# Patient Record
Sex: MALE | Race: BLACK OR AFRICAN AMERICAN | Employment: UNEMPLOYED | URBAN - METROPOLITAN AREA
[De-identification: names, ages, dates, MRNs, and addresses within clinical notes are randomized per-mention and may not be internally consistent; named-entity substitution may affect disease eponyms.]

---

## 2017-01-01 ENCOUNTER — TELEPHONE (OUTPATIENT)
Dept: FAMILY MEDICINE CLINIC | Age: 52
End: 2017-01-01

## 2017-01-01 ENCOUNTER — HOSPITAL ENCOUNTER (OUTPATIENT)
Dept: ULTRASOUND IMAGING | Age: 52
Discharge: HOME | End: 2017-11-15
Attending: INTERNAL MEDICINE
Payer: COMMERCIAL

## 2017-01-01 ENCOUNTER — HOSPITAL ENCOUNTER (OUTPATIENT)
Dept: CT IMAGING | Age: 52
Discharge: HOME | End: 2017-11-07
Attending: INTERNAL MEDICINE
Payer: COMMERCIAL

## 2017-01-01 ENCOUNTER — HOSPITAL ENCOUNTER (OUTPATIENT)
Dept: ULTRASOUND IMAGING | Age: 52
Discharge: HOME | End: 2017-10-04
Attending: INTERNAL MEDICINE
Payer: COMMERCIAL

## 2017-01-01 ENCOUNTER — HOSPITAL ENCOUNTER (OUTPATIENT)
Dept: CT IMAGING | Age: 52
Discharge: HOME | End: 2017-07-08
Attending: INTERNAL MEDICINE
Payer: COMMERCIAL

## 2017-01-01 ENCOUNTER — HOSPITAL ENCOUNTER (INPATIENT)
Age: 52
LOS: 4 days | DRG: 368 | End: 2017-11-25
Attending: EMERGENCY MEDICINE | Admitting: INTERNAL MEDICINE
Payer: COMMERCIAL

## 2017-01-01 ENCOUNTER — HOSPITAL ENCOUNTER (OUTPATIENT)
Dept: ULTRASOUND IMAGING | Age: 52
Discharge: HOME | End: 2017-10-11
Attending: INTERNAL MEDICINE
Payer: COMMERCIAL

## 2017-01-01 ENCOUNTER — HOSPITAL ENCOUNTER (OUTPATIENT)
Dept: ULTRASOUND IMAGING | Age: 52
Discharge: HOME | End: 2017-09-27
Attending: INTERNAL MEDICINE
Payer: COMMERCIAL

## 2017-01-01 ENCOUNTER — HOSPITAL ENCOUNTER (OUTPATIENT)
Dept: CT IMAGING | Age: 52
Discharge: HOME | End: 2017-08-10
Attending: RADIOLOGY | Admitting: RADIOLOGY
Payer: COMMERCIAL

## 2017-01-01 ENCOUNTER — HOSPITAL ENCOUNTER (OUTPATIENT)
Dept: LAB | Age: 52
Discharge: HOME | End: 2017-04-18
Payer: SELF-PAY

## 2017-01-01 ENCOUNTER — HOSPITAL ENCOUNTER (EMERGENCY)
Age: 52
Discharge: HOME | End: 2017-09-14
Attending: EMERGENCY MEDICINE | Admitting: EMERGENCY MEDICINE
Payer: COMMERCIAL

## 2017-01-01 ENCOUNTER — HOSPITAL ENCOUNTER (OUTPATIENT)
Dept: LAB | Age: 52
Discharge: HOME | End: 2017-10-18

## 2017-01-01 ENCOUNTER — HOSPITAL ENCOUNTER (OUTPATIENT)
Dept: ULTRASOUND IMAGING | Age: 52
Discharge: HOME | End: 2017-09-13
Attending: INTERNAL MEDICINE
Payer: COMMERCIAL

## 2017-01-01 ENCOUNTER — HOSPITAL ENCOUNTER (EMERGENCY)
Age: 52
Discharge: HOME | End: 2017-07-19
Attending: EMERGENCY MEDICINE
Payer: COMMERCIAL

## 2017-01-01 ENCOUNTER — HOSPITAL ENCOUNTER (OUTPATIENT)
Dept: LAB | Age: 52
Discharge: HOME | End: 2017-11-15
Attending: INTERNAL MEDICINE
Payer: COMMERCIAL

## 2017-01-01 ENCOUNTER — HOSPITAL ENCOUNTER (OUTPATIENT)
Dept: INFUSION THERAPY | Age: 52
Discharge: HOME | End: 2017-07-06
Payer: COMMERCIAL

## 2017-01-01 ENCOUNTER — APPOINTMENT (OUTPATIENT)
Dept: ULTRASOUND IMAGING | Age: 52
DRG: 368 | End: 2017-01-01
Attending: INTERNAL MEDICINE
Payer: COMMERCIAL

## 2017-01-01 ENCOUNTER — OFFICE VISIT (OUTPATIENT)
Dept: FAMILY MEDICINE CLINIC | Age: 52
End: 2017-01-01

## 2017-01-01 ENCOUNTER — HOSPITAL ENCOUNTER (OUTPATIENT)
Dept: ULTRASOUND IMAGING | Age: 52
Discharge: HOME | End: 2017-10-18
Attending: INTERNAL MEDICINE
Payer: COMMERCIAL

## 2017-01-01 ENCOUNTER — HOSPITAL ENCOUNTER (OUTPATIENT)
Dept: ULTRASOUND IMAGING | Age: 52
Discharge: HOME | End: 2017-11-01
Attending: INTERNAL MEDICINE
Payer: COMMERCIAL

## 2017-01-01 ENCOUNTER — HOSPITAL ENCOUNTER (OUTPATIENT)
Dept: ULTRASOUND IMAGING | Age: 52
Discharge: HOME | End: 2017-08-18
Attending: INTERNAL MEDICINE
Payer: COMMERCIAL

## 2017-01-01 ENCOUNTER — HOSPITAL ENCOUNTER (OUTPATIENT)
Dept: INFUSION THERAPY | Age: 52
Discharge: HOME | End: 2017-07-07
Payer: COMMERCIAL

## 2017-01-01 ENCOUNTER — HOSPITAL ENCOUNTER (OUTPATIENT)
Dept: ULTRASOUND IMAGING | Age: 52
Discharge: HOME | End: 2017-10-25
Attending: INTERNAL MEDICINE
Payer: COMMERCIAL

## 2017-01-01 ENCOUNTER — APPOINTMENT (OUTPATIENT)
Dept: GENERAL RADIOLOGY | Age: 52
End: 2017-01-01
Attending: EMERGENCY MEDICINE
Payer: COMMERCIAL

## 2017-01-01 ENCOUNTER — HOSPITAL ENCOUNTER (OUTPATIENT)
Dept: ULTRASOUND IMAGING | Age: 52
Discharge: HOME | End: 2017-09-20
Attending: INTERNAL MEDICINE
Payer: COMMERCIAL

## 2017-01-01 ENCOUNTER — APPOINTMENT (OUTPATIENT)
Dept: GENERAL RADIOLOGY | Age: 52
DRG: 368 | End: 2017-01-01
Attending: EMERGENCY MEDICINE
Payer: COMMERCIAL

## 2017-01-01 ENCOUNTER — HOSPITAL ENCOUNTER (OUTPATIENT)
Dept: LAB | Age: 52
Discharge: HOME | End: 2017-09-06
Payer: COMMERCIAL

## 2017-01-01 ENCOUNTER — APPOINTMENT (OUTPATIENT)
Dept: ULTRASOUND IMAGING | Age: 52
End: 2017-01-01
Attending: INTERNAL MEDICINE
Payer: COMMERCIAL

## 2017-01-01 ENCOUNTER — PATIENT OUTREACH (OUTPATIENT)
Dept: FAMILY MEDICINE CLINIC | Age: 52
End: 2017-01-01

## 2017-01-01 ENCOUNTER — HOSPITAL ENCOUNTER (OUTPATIENT)
Dept: ULTRASOUND IMAGING | Age: 52
Discharge: HOME | End: 2017-09-06
Attending: INTERNAL MEDICINE
Payer: COMMERCIAL

## 2017-01-01 VITALS
SYSTOLIC BLOOD PRESSURE: 124 MMHG | OXYGEN SATURATION: 100 % | HEART RATE: 103 BPM | HEIGHT: 67 IN | TEMPERATURE: 100.5 F | RESPIRATION RATE: 24 BRPM | DIASTOLIC BLOOD PRESSURE: 89 MMHG | WEIGHT: 150 LBS | BODY MASS INDEX: 23.54 KG/M2

## 2017-01-01 VITALS
BODY MASS INDEX: 24.64 KG/M2 | TEMPERATURE: 98 F | WEIGHT: 157 LBS | SYSTOLIC BLOOD PRESSURE: 92 MMHG | RESPIRATION RATE: 16 BRPM | HEIGHT: 67 IN | OXYGEN SATURATION: 99 % | DIASTOLIC BLOOD PRESSURE: 60 MMHG | HEART RATE: 103 BPM

## 2017-01-01 VITALS
RESPIRATION RATE: 17 BRPM | TEMPERATURE: 97.4 F | SYSTOLIC BLOOD PRESSURE: 91 MMHG | OXYGEN SATURATION: 98 % | HEART RATE: 89 BPM | DIASTOLIC BLOOD PRESSURE: 59 MMHG

## 2017-01-01 VITALS
BODY MASS INDEX: 23.7 KG/M2 | OXYGEN SATURATION: 98 % | DIASTOLIC BLOOD PRESSURE: 72 MMHG | HEIGHT: 67 IN | RESPIRATION RATE: 16 BRPM | TEMPERATURE: 98 F | WEIGHT: 151 LBS | SYSTOLIC BLOOD PRESSURE: 102 MMHG | HEART RATE: 62 BPM

## 2017-01-01 VITALS
TEMPERATURE: 98.7 F | SYSTOLIC BLOOD PRESSURE: 136 MMHG | DIASTOLIC BLOOD PRESSURE: 95 MMHG | HEART RATE: 91 BPM | RESPIRATION RATE: 20 BRPM | HEIGHT: 67 IN | BODY MASS INDEX: 25.31 KG/M2 | WEIGHT: 161.25 LBS | OXYGEN SATURATION: 100 %

## 2017-01-01 VITALS
HEIGHT: 67 IN | BODY MASS INDEX: 24.55 KG/M2 | TEMPERATURE: 98.3 F | HEART RATE: 43 BPM | DIASTOLIC BLOOD PRESSURE: 26 MMHG | OXYGEN SATURATION: 98 % | RESPIRATION RATE: 7 BRPM | WEIGHT: 156.4 LBS | SYSTOLIC BLOOD PRESSURE: 51 MMHG

## 2017-01-01 VITALS
RESPIRATION RATE: 15 BRPM | TEMPERATURE: 98.3 F | SYSTOLIC BLOOD PRESSURE: 108 MMHG | HEART RATE: 81 BPM | DIASTOLIC BLOOD PRESSURE: 75 MMHG

## 2017-01-01 VITALS
OXYGEN SATURATION: 99 % | TEMPERATURE: 98 F | DIASTOLIC BLOOD PRESSURE: 77 MMHG | SYSTOLIC BLOOD PRESSURE: 113 MMHG | RESPIRATION RATE: 18 BRPM | HEART RATE: 60 BPM

## 2017-01-01 VITALS
BODY MASS INDEX: 25.52 KG/M2 | TEMPERATURE: 98 F | RESPIRATION RATE: 16 BRPM | SYSTOLIC BLOOD PRESSURE: 90 MMHG | DIASTOLIC BLOOD PRESSURE: 70 MMHG | HEIGHT: 67 IN | WEIGHT: 162.6 LBS

## 2017-01-01 VITALS
OXYGEN SATURATION: 99 % | HEART RATE: 76 BPM | BODY MASS INDEX: 24.92 KG/M2 | WEIGHT: 158.8 LBS | RESPIRATION RATE: 16 BRPM | SYSTOLIC BLOOD PRESSURE: 130 MMHG | DIASTOLIC BLOOD PRESSURE: 88 MMHG | HEIGHT: 67 IN

## 2017-01-01 DIAGNOSIS — B37.0 ORAL THRUSH: ICD-10-CM

## 2017-01-01 DIAGNOSIS — R79.89 ABNORMAL CBC: ICD-10-CM

## 2017-01-01 DIAGNOSIS — R18.8 OTHER ASCITES: ICD-10-CM

## 2017-01-01 DIAGNOSIS — C18.9 MALIGNANT NEOPLASM OF COLON, UNSPECIFIED PART OF COLON (HCC): ICD-10-CM

## 2017-01-01 DIAGNOSIS — R52 PAIN: ICD-10-CM

## 2017-01-01 DIAGNOSIS — Z23 ENCOUNTER FOR IMMUNIZATION: ICD-10-CM

## 2017-01-01 DIAGNOSIS — C18.4 MALIGNANT NEOPLASM OF TRANSVERSE COLON (HCC): ICD-10-CM

## 2017-01-01 DIAGNOSIS — C78.7 LIVER METASTASES (HCC): ICD-10-CM

## 2017-01-01 DIAGNOSIS — C78.7 SECONDARY MALIGNANT NEOPLASM OF LIVER (HCC): ICD-10-CM

## 2017-01-01 DIAGNOSIS — I95.9 HYPOTENSION, UNSPECIFIED HYPOTENSION TYPE: ICD-10-CM

## 2017-01-01 DIAGNOSIS — I26.99 PULMONARY EMBOLISM, OTHER: ICD-10-CM

## 2017-01-01 DIAGNOSIS — E11.65 POORLY CONTROLLED DIABETES MELLITUS (HCC): ICD-10-CM

## 2017-01-01 DIAGNOSIS — I26.99 OTHER PULMONARY EMBOLISM WITHOUT ACUTE COR PULMONALE, UNSPECIFIED CHRONICITY (HCC): ICD-10-CM

## 2017-01-01 DIAGNOSIS — I85.11 SECONDARY ESOPHAGEAL VARICES WITH BLEEDING (HCC): ICD-10-CM

## 2017-01-01 DIAGNOSIS — C18.9 COLON CANCER (HCC): ICD-10-CM

## 2017-01-01 DIAGNOSIS — R79.89 ABNORMAL CBC: Primary | ICD-10-CM

## 2017-01-01 DIAGNOSIS — E11.9 WELL CONTROLLED DIABETES MELLITUS (HCC): ICD-10-CM

## 2017-01-01 DIAGNOSIS — G47.9 SLEEP TROUBLE: ICD-10-CM

## 2017-01-01 DIAGNOSIS — C18.9 METASTATIC COLON CANCER TO LIVER (HCC): Primary | ICD-10-CM

## 2017-01-01 DIAGNOSIS — R35.0 URINARY FREQUENCY: ICD-10-CM

## 2017-01-01 DIAGNOSIS — R18.8 ASCITES: ICD-10-CM

## 2017-01-01 DIAGNOSIS — E87.5 ACUTE HYPERKALEMIA: ICD-10-CM

## 2017-01-01 DIAGNOSIS — R10.9 ABDOMINAL PAIN: ICD-10-CM

## 2017-01-01 DIAGNOSIS — C78.7 METASTATIC COLON CANCER TO LIVER (HCC): Primary | ICD-10-CM

## 2017-01-01 DIAGNOSIS — E11.65 POORLY CONTROLLED DIABETES MELLITUS (HCC): Primary | ICD-10-CM

## 2017-01-01 DIAGNOSIS — M79.89 LEG SWELLING: ICD-10-CM

## 2017-01-01 DIAGNOSIS — I10 ESSENTIAL HYPERTENSION: ICD-10-CM

## 2017-01-01 DIAGNOSIS — Z71.89 ADVANCED CARE PLANNING/COUNSELING DISCUSSION: ICD-10-CM

## 2017-01-01 DIAGNOSIS — E11.9 WELL CONTROLLED DIABETES MELLITUS (HCC): Primary | ICD-10-CM

## 2017-01-01 DIAGNOSIS — D64.9 ANEMIA, UNSPECIFIED TYPE: ICD-10-CM

## 2017-01-01 DIAGNOSIS — R10.13 ABDOMINAL PAIN, EPIGASTRIC: Primary | ICD-10-CM

## 2017-01-01 DIAGNOSIS — N17.9 ACUTE RENAL FAILURE, UNSPECIFIED ACUTE RENAL FAILURE TYPE (HCC): ICD-10-CM

## 2017-01-01 DIAGNOSIS — J20.9 ACUTE BRONCHITIS, UNSPECIFIED ORGANISM: Primary | ICD-10-CM

## 2017-01-01 DIAGNOSIS — Z79.01 ANTICOAGULANT LONG-TERM USE: ICD-10-CM

## 2017-01-01 DIAGNOSIS — K92.2 GASTROINTESTINAL HEMORRHAGE, UNSPECIFIED GASTROINTESTINAL HEMORRHAGE TYPE: ICD-10-CM

## 2017-01-01 DIAGNOSIS — R18.0 ASCITES, MALIGNANT: ICD-10-CM

## 2017-01-01 DIAGNOSIS — E87.1 HYPONATREMIA: ICD-10-CM

## 2017-01-01 DIAGNOSIS — Z79.4 TYPE 2 DIABETES MELLITUS WITH HYPERGLYCEMIA, WITH LONG-TERM CURRENT USE OF INSULIN (HCC): Primary | ICD-10-CM

## 2017-01-01 DIAGNOSIS — I10 ESSENTIAL HYPERTENSION: Primary | ICD-10-CM

## 2017-01-01 DIAGNOSIS — E11.65 TYPE 2 DIABETES MELLITUS WITH HYPERGLYCEMIA, WITH LONG-TERM CURRENT USE OF INSULIN (HCC): Primary | ICD-10-CM

## 2017-01-01 LAB
ABO + RH BLD: NORMAL
ABO + RH BLD: NORMAL
ALBUMIN SERPL BCP-MCNC: 2.3 G/DL (ref 3.4–5)
ALBUMIN SERPL BCP-MCNC: 2.6 G/DL (ref 3.4–5)
ALBUMIN SERPL-MCNC: 1.2 G/DL (ref 3.4–5)
ALBUMIN SERPL-MCNC: 1.2 G/DL (ref 3.4–5)
ALBUMIN SERPL-MCNC: 1.4 G/DL (ref 3.4–5)
ALBUMIN SERPL-MCNC: 1.7 G/DL (ref 3.4–5)
ALBUMIN/GLOB SERPL: 0.3 {RATIO} (ref 0.8–1.7)
ALBUMIN/GLOB SERPL: 0.4 {RATIO} (ref 0.8–1.7)
ALBUMIN/GLOB SERPL: 0.5 {RATIO} (ref 0.8–1.7)
ALBUMIN/GLOB SERPL: 0.6 {RATIO} (ref 0.8–1.7)
ALP SERPL-CCNC: 1182 U/L (ref 45–117)
ALP SERPL-CCNC: 235 U/L (ref 45–117)
ALP SERPL-CCNC: 372 U/L (ref 45–117)
ALP SERPL-CCNC: 627 U/L (ref 45–117)
ALP SERPL-CCNC: 959 U/L (ref 45–117)
ALP SERPL-CCNC: 967 U/L (ref 45–117)
ALT SERPL-CCNC: 105 U/L (ref 16–61)
ALT SERPL-CCNC: 117 U/L (ref 16–61)
ALT SERPL-CCNC: 122 U/L (ref 16–61)
ALT SERPL-CCNC: 22 U/L (ref 16–61)
ALT SERPL-CCNC: 32 U/L (ref 16–61)
ALT SERPL-CCNC: 35 U/L (ref 16–61)
AMMONIA PLAS-SCNC: 52 UMOL/L (ref 11–32)
ANION GAP BLD CALC-SCNC: 5 MMOL/L (ref 3–18)
ANION GAP BLD CALC-SCNC: 7 MMOL/L (ref 3–18)
ANION GAP BLD CALC-SCNC: 8 MMOL/L (ref 3–18)
ANION GAP SERPL CALC-SCNC: 18 MMOL/L (ref 3–18)
ANION GAP SERPL CALC-SCNC: 19 MMOL/L (ref 3–18)
ANION GAP SERPL CALC-SCNC: 21 MMOL/L (ref 3–18)
ANION GAP SERPL CALC-SCNC: 21 MMOL/L (ref 3–18)
ANION GAP SERPL CALC-SCNC: 8 MMOL/L (ref 3–18)
APPEARANCE UR: CLEAR
APTT PPP: 28.1 SEC (ref 23–36.4)
APTT PPP: 32.8 SEC (ref 23–36.4)
APTT PPP: 33.9 SEC (ref 23–36.4)
APTT PPP: 35 SEC (ref 23–36.4)
APTT PPP: 38 SEC (ref 23–36.4)
APTT PPP: 49.4 SEC (ref 23–36.4)
AST SERPL W P-5'-P-CCNC: 40 U/L (ref 15–37)
AST SERPL W P-5'-P-CCNC: 44 U/L (ref 15–37)
AST SERPL-CCNC: 135 U/L (ref 15–37)
AST SERPL-CCNC: 233 U/L (ref 15–37)
AST SERPL-CCNC: 264 U/L (ref 15–37)
AST SERPL-CCNC: 286 U/L (ref 15–37)
ATRIAL RATE: 101 BPM
ATRIAL RATE: 105 BPM
ATRIAL RATE: 89 BPM
BACTERIA SPEC CULT: NORMAL
BACTERIA SPEC CULT: NORMAL
BACTERIA URNS QL MICRO: ABNORMAL /HPF
BASOPHILS # BLD AUTO: 0.2 K/UL (ref 0–0.06)
BASOPHILS # BLD: 0 K/UL (ref 0–0.06)
BASOPHILS # BLD: 0 K/UL (ref 0–0.1)
BASOPHILS # BLD: 0 K/UL (ref 0–0.1)
BASOPHILS # BLD: 0.1 K/UL (ref 0–0.06)
BASOPHILS # BLD: 0.2 K/UL (ref 0–0.06)
BASOPHILS # BLD: 2 % (ref 0–3)
BASOPHILS NFR BLD: 0 % (ref 0–2)
BASOPHILS NFR BLD: 0 % (ref 0–2)
BASOPHILS NFR BLD: 0 % (ref 0–3)
BASOPHILS NFR BLD: 1 % (ref 0–3)
BASOPHILS NFR BLD: 1 % (ref 0–3)
BILIRUB DIRECT SERPL-MCNC: 10.2 MG/DL (ref 0–0.2)
BILIRUB SERPL-MCNC: 0.8 MG/DL (ref 0.2–1)
BILIRUB SERPL-MCNC: 0.9 MG/DL (ref 0.2–1)
BILIRUB SERPL-MCNC: 11 MG/DL (ref 0.2–1)
BILIRUB SERPL-MCNC: 11.4 MG/DL (ref 0.2–1)
BILIRUB SERPL-MCNC: 11.7 MG/DL (ref 0.2–1)
BILIRUB SERPL-MCNC: 2.2 MG/DL (ref 0.2–1)
BILIRUB UR QL STRIP: NORMAL
BILIRUB UR QL: NEGATIVE
BLD PROD TYP BPU: NORMAL
BLOOD GROUP ANTIBODIES SERPL: NORMAL
BLOOD GROUP ANTIBODIES SERPL: NORMAL
BPU ID: NORMAL
BUN SERPL-MCNC: 10 MG/DL (ref 7–18)
BUN SERPL-MCNC: 144 MG/DL (ref 7–18)
BUN SERPL-MCNC: 175 MG/DL (ref 7–18)
BUN SERPL-MCNC: 178 MG/DL (ref 7–18)
BUN SERPL-MCNC: 182 MG/DL (ref 7–18)
BUN SERPL-MCNC: 189 MG/DL (ref 7–18)
BUN SERPL-MCNC: 23 MG/DL (ref 7–18)
BUN SERPL-MCNC: 7 MG/DL (ref 7–18)
BUN SERPL-MCNC: 9 MG/DL (ref 7–18)
BUN SERPL-MCNC: >150 MG/DL (ref 7–18)
BUN/CREAT SERPL: 11 (ref 12–20)
BUN/CREAT SERPL: 11 (ref 12–20)
BUN/CREAT SERPL: 15 (ref 12–20)
BUN/CREAT SERPL: 25 (ref 12–20)
BUN/CREAT SERPL: 25 (ref 12–20)
BUN/CREAT SERPL: 27 (ref 12–20)
BUN/CREAT SERPL: 28 (ref 12–20)
BUN/CREAT SERPL: ABNORMAL (ref 12–20)
CALCIUM SERPL-MCNC: 8 MG/DL (ref 8.5–10.1)
CALCIUM SERPL-MCNC: 8.1 MG/DL (ref 8.5–10.1)
CALCIUM SERPL-MCNC: 8.2 MG/DL (ref 8.5–10.1)
CALCIUM SERPL-MCNC: 8.2 MG/DL (ref 8.5–10.1)
CALCIUM SERPL-MCNC: 8.6 MG/DL (ref 8.5–10.1)
CALCIUM SERPL-MCNC: 8.7 MG/DL (ref 8.5–10.1)
CALCIUM SERPL-MCNC: 8.9 MG/DL (ref 8.5–10.1)
CALCIUM SERPL-MCNC: 9.3 MG/DL (ref 8.5–10.1)
CALCULATED P AXIS, ECG09: 30 DEGREES
CALCULATED P AXIS, ECG09: 47 DEGREES
CALCULATED P AXIS, ECG09: 47 DEGREES
CALCULATED R AXIS, ECG10: 24 DEGREES
CALCULATED R AXIS, ECG10: 32 DEGREES
CALCULATED R AXIS, ECG10: 32 DEGREES
CALCULATED T AXIS, ECG11: 50 DEGREES
CALCULATED T AXIS, ECG11: 51 DEGREES
CALCULATED T AXIS, ECG11: 52 DEGREES
CALLED TO:,BCALL1: NORMAL
CHLORIDE SERPL-SCNC: 105 MMOL/L (ref 100–108)
CHLORIDE SERPL-SCNC: 105 MMOL/L (ref 100–108)
CHLORIDE SERPL-SCNC: 106 MMOL/L (ref 100–108)
CHLORIDE SERPL-SCNC: 106 MMOL/L (ref 100–108)
CHLORIDE SERPL-SCNC: 93 MMOL/L (ref 100–108)
CHLORIDE SERPL-SCNC: 94 MMOL/L (ref 100–108)
CHLORIDE SERPL-SCNC: 95 MMOL/L (ref 100–108)
CHLORIDE SERPL-SCNC: 99 MMOL/L (ref 100–108)
CHOLEST SERPL-MCNC: 150 MG/DL
CK MB CFR SERPL CALC: NORMAL % (ref 0–4)
CK MB SERPL-MCNC: <1 NG/ML (ref 5–25)
CK SERPL-CCNC: 71 U/L (ref 39–308)
CO2 SERPL-SCNC: 10 MMOL/L (ref 21–32)
CO2 SERPL-SCNC: 11 MMOL/L (ref 21–32)
CO2 SERPL-SCNC: 12 MMOL/L (ref 21–32)
CO2 SERPL-SCNC: 12 MMOL/L (ref 21–32)
CO2 SERPL-SCNC: 13 MMOL/L (ref 21–32)
CO2 SERPL-SCNC: 14 MMOL/L (ref 21–32)
CO2 SERPL-SCNC: 24 MMOL/L (ref 21–32)
CO2 SERPL-SCNC: 26 MMOL/L (ref 21–32)
CO2 SERPL-SCNC: 26 MMOL/L (ref 21–32)
CO2 SERPL-SCNC: 31 MMOL/L (ref 21–32)
COLOR UR: ABNORMAL
CREAT SERPL-MCNC: 0.61 MG/DL (ref 0.6–1.3)
CREAT SERPL-MCNC: 0.66 MG/DL (ref 0.6–1.3)
CREAT SERPL-MCNC: 0.87 MG/DL (ref 0.6–1.3)
CREAT SERPL-MCNC: 0.92 MG/DL (ref 0.6–1.3)
CREAT SERPL-MCNC: 5.25 MG/DL (ref 0.6–1.3)
CREAT SERPL-MCNC: 6.55 MG/DL (ref 0.6–1.3)
CREAT SERPL-MCNC: 6.69 MG/DL (ref 0.6–1.3)
CREAT SERPL-MCNC: 6.76 MG/DL (ref 0.6–1.3)
CREAT SERPL-MCNC: 7.01 MG/DL (ref 0.6–1.3)
CREAT SERPL-MCNC: 7.39 MG/DL (ref 0.6–1.3)
CREAT UR-MCNC: 0.6 MG/DL (ref 0.6–1.3)
CREAT UR-MCNC: 3.5 MG/DL (ref 0.6–1.3)
CREAT UR-MCNC: 390 MG/DL (ref 30–125)
CROSSMATCH RESULT,%XM: NORMAL
DIAGNOSIS, 93000: NORMAL
DIFFERENTIAL METHOD BLD: ABNORMAL
EOSINOPHIL # BLD: 0 K/UL (ref 0–0.4)
EOSINOPHIL # BLD: 0.2 K/UL (ref 0–0.4)
EOSINOPHIL # BLD: 0.2 K/UL (ref 0–0.4)
EOSINOPHIL NFR BLD: 0 % (ref 0–5)
EOSINOPHIL NFR BLD: 1 % (ref 0–5)
EOSINOPHIL NFR BLD: 2 % (ref 0–5)
EPITH CASTS URNS QL MICRO: ABNORMAL /LPF (ref 0–5)
ERYTHROCYTE [DISTWIDTH] IN BLOOD BY AUTOMATED COUNT: 16 % (ref 11.6–14.5)
ERYTHROCYTE [DISTWIDTH] IN BLOOD BY AUTOMATED COUNT: 18.5 % (ref 11.6–14.5)
ERYTHROCYTE [DISTWIDTH] IN BLOOD BY AUTOMATED COUNT: 18.6 % (ref 11.6–14.5)
ERYTHROCYTE [DISTWIDTH] IN BLOOD BY AUTOMATED COUNT: 18.9 % (ref 11.6–14.5)
ERYTHROCYTE [DISTWIDTH] IN BLOOD BY AUTOMATED COUNT: 19.2 % (ref 11.6–14.5)
ERYTHROCYTE [DISTWIDTH] IN BLOOD BY AUTOMATED COUNT: 19.6 % (ref 11.6–14.5)
ERYTHROCYTE [DISTWIDTH] IN BLOOD BY AUTOMATED COUNT: 19.7 % (ref 11.6–14.5)
ERYTHROCYTE [DISTWIDTH] IN BLOOD BY AUTOMATED COUNT: 20 % (ref 11.6–14.5)
ERYTHROCYTE [DISTWIDTH] IN BLOOD BY AUTOMATED COUNT: 20.4 % (ref 11.6–14.5)
EST. AVERAGE GLUCOSE BLD GHB EST-MCNC: 301 MG/DL
EST. AVERAGE GLUCOSE BLD GHB EST-MCNC: NORMAL MG/DL
GLOBULIN SER CALC-MCNC: 3.9 G/DL (ref 2–4)
GLOBULIN SER CALC-MCNC: 4.1 G/DL (ref 2–4)
GLOBULIN SER CALC-MCNC: 4.2 G/DL (ref 2–4)
GLOBULIN SER CALC-MCNC: 4.2 G/DL (ref 2–4)
GLOBULIN SER CALC-MCNC: 5 G/DL (ref 2–4)
GLOBULIN SER CALC-MCNC: 5 G/DL (ref 2–4)
GLUCOSE BLD STRIP.AUTO-MCNC: 102 MG/DL (ref 70–110)
GLUCOSE BLD STRIP.AUTO-MCNC: 104 MG/DL (ref 70–110)
GLUCOSE BLD STRIP.AUTO-MCNC: 108 MG/DL (ref 70–110)
GLUCOSE BLD STRIP.AUTO-MCNC: 131 MG/DL (ref 70–110)
GLUCOSE BLD STRIP.AUTO-MCNC: 132 MG/DL (ref 70–110)
GLUCOSE BLD STRIP.AUTO-MCNC: 135 MG/DL (ref 70–110)
GLUCOSE BLD STRIP.AUTO-MCNC: 135 MG/DL (ref 70–110)
GLUCOSE BLD STRIP.AUTO-MCNC: 136 MG/DL (ref 70–110)
GLUCOSE BLD STRIP.AUTO-MCNC: 139 MG/DL (ref 70–110)
GLUCOSE BLD STRIP.AUTO-MCNC: 142 MG/DL (ref 70–110)
GLUCOSE BLD STRIP.AUTO-MCNC: 161 MG/DL (ref 70–110)
GLUCOSE BLD STRIP.AUTO-MCNC: 162 MG/DL (ref 70–110)
GLUCOSE BLD STRIP.AUTO-MCNC: 170 MG/DL (ref 70–110)
GLUCOSE BLD STRIP.AUTO-MCNC: 171 MG/DL (ref 70–110)
GLUCOSE BLD STRIP.AUTO-MCNC: 175 MG/DL (ref 70–110)
GLUCOSE BLD STRIP.AUTO-MCNC: 177 MG/DL (ref 70–110)
GLUCOSE BLD STRIP.AUTO-MCNC: 182 MG/DL (ref 70–110)
GLUCOSE BLD STRIP.AUTO-MCNC: 187 MG/DL (ref 70–110)
GLUCOSE BLD STRIP.AUTO-MCNC: 190 MG/DL (ref 70–110)
GLUCOSE BLD STRIP.AUTO-MCNC: 34 MG/DL (ref 70–110)
GLUCOSE BLD STRIP.AUTO-MCNC: 51 MG/DL (ref 70–110)
GLUCOSE BLD STRIP.AUTO-MCNC: 59 MG/DL (ref 70–110)
GLUCOSE BLD STRIP.AUTO-MCNC: 62 MG/DL (ref 70–110)
GLUCOSE BLD STRIP.AUTO-MCNC: 62 MG/DL (ref 70–110)
GLUCOSE BLD STRIP.AUTO-MCNC: 71 MG/DL (ref 70–110)
GLUCOSE BLD STRIP.AUTO-MCNC: 89 MG/DL (ref 70–110)
GLUCOSE BLD STRIP.AUTO-MCNC: 98 MG/DL (ref 70–110)
GLUCOSE FLD-MCNC: 66 MG/DL
GLUCOSE SERPL-MCNC: 110 MG/DL (ref 74–99)
GLUCOSE SERPL-MCNC: 120 MG/DL (ref 74–99)
GLUCOSE SERPL-MCNC: 128 MG/DL (ref 74–99)
GLUCOSE SERPL-MCNC: 129 MG/DL (ref 74–99)
GLUCOSE SERPL-MCNC: 129 MG/DL (ref 74–99)
GLUCOSE SERPL-MCNC: 143 MG/DL (ref 74–99)
GLUCOSE SERPL-MCNC: 153 MG/DL (ref 74–99)
GLUCOSE SERPL-MCNC: 166 MG/DL (ref 74–99)
GLUCOSE SERPL-MCNC: 47 MG/DL (ref 74–99)
GLUCOSE SERPL-MCNC: 62 MG/DL (ref 74–99)
GLUCOSE UR STRIP.AUTO-MCNC: NEGATIVE MG/DL
GLUCOSE UR-MCNC: NEGATIVE MG/DL
HBA1C MFR BLD: 12.1 % (ref 4.2–5.6)
HBA1C MFR BLD: 4.7 % (ref 4.2–5.6)
HCT VFR BLD AUTO: 24.5 % (ref 36–48)
HCT VFR BLD AUTO: 26.4 % (ref 36–48)
HCT VFR BLD AUTO: 26.7 % (ref 36–48)
HCT VFR BLD AUTO: 26.8 % (ref 36–48)
HCT VFR BLD AUTO: 26.8 % (ref 36–48)
HCT VFR BLD AUTO: 27 % (ref 36–48)
HCT VFR BLD AUTO: 27.4 % (ref 36–48)
HCT VFR BLD AUTO: 28.4 % (ref 36–48)
HCT VFR BLD AUTO: 28.5 % (ref 36–48)
HCT VFR BLD AUTO: 29.6 % (ref 36–48)
HCT VFR BLD AUTO: 30.1 % (ref 36–48)
HCT VFR BLD AUTO: 30.4 % (ref 36–48)
HCT VFR BLD AUTO: 31.3 % (ref 36–48)
HCT VFR BLD AUTO: 31.4 % (ref 36–48)
HCT VFR BLD AUTO: 31.4 % (ref 36–48)
HCT VFR BLD AUTO: 31.8 % (ref 36–48)
HCT VFR BLD AUTO: 31.9 % (ref 36–48)
HCT VFR BLD AUTO: 32.4 % (ref 36–48)
HCT VFR BLD AUTO: 35.4 % (ref 36–48)
HCT VFR BLD AUTO: 36.9 % (ref 36–48)
HDLC SERPL-MCNC: 41 MG/DL (ref 40–60)
HDLC SERPL: 3.7 {RATIO} (ref 0–5)
HGB BLD-MCNC: 10.2 G/DL (ref 13–16)
HGB BLD-MCNC: 10.4 G/DL (ref 13–16)
HGB BLD-MCNC: 10.5 G/DL (ref 13–16)
HGB BLD-MCNC: 10.5 G/DL (ref 13–16)
HGB BLD-MCNC: 10.8 G/DL (ref 13–16)
HGB BLD-MCNC: 10.8 G/DL (ref 13–16)
HGB BLD-MCNC: 10.9 G/DL (ref 13–16)
HGB BLD-MCNC: 11.1 G/DL (ref 13–16)
HGB BLD-MCNC: 11.1 G/DL (ref 13–16)
HGB BLD-MCNC: 11.6 G/DL (ref 13–16)
HGB BLD-MCNC: 11.9 G/DL (ref 13–16)
HGB BLD-MCNC: 8.5 G/DL (ref 13–16)
HGB BLD-MCNC: 9.1 G/DL (ref 13–16)
HGB BLD-MCNC: 9.2 G/DL (ref 13–16)
HGB BLD-MCNC: 9.4 G/DL (ref 13–16)
HGB BLD-MCNC: 9.4 G/DL (ref 13–16)
HGB BLD-MCNC: 9.5 G/DL (ref 13–16)
HGB BLD-MCNC: 9.8 G/DL (ref 13–16)
HGB BLD-MCNC: 9.8 G/DL (ref 13–16)
HGB BLD-MCNC: 9.9 G/DL (ref 13–16)
HGB UR QL STRIP: NEGATIVE
INR PPP: 1.2 (ref 0.8–1.2)
INR PPP: 1.3 (ref 0.8–1.2)
INR PPP: 1.3 (ref 0.8–1.2)
INR PPP: 1.4 (ref 0.8–1.2)
INR PPP: 1.5 (ref 0.8–1.2)
INR PPP: 1.5 (ref 0.8–1.2)
INR PPP: 1.6 (ref 0.8–1.2)
INR PPP: 3.1 (ref 0.8–1.2)
KETONES P FAST UR STRIP-MCNC: NORMAL MG/DL
KETONES UR QL STRIP.AUTO: NEGATIVE MG/DL
LACTATE BLD-SCNC: 2 MMOL/L (ref 0.4–2)
LDLC SERPL CALC-MCNC: 91.8 MG/DL (ref 0–100)
LEUKOCYTE ESTERASE UR QL STRIP.AUTO: NEGATIVE
LIPASE SERPL-CCNC: 102 U/L (ref 73–393)
LIPASE SERPL-CCNC: 159 U/L (ref 73–393)
LIPID PROFILE,FLP: NORMAL
LYMPHOCYTES # BLD AUTO: 17 % (ref 20–51)
LYMPHOCYTES # BLD: 0.9 K/UL (ref 0.8–3.5)
LYMPHOCYTES # BLD: 0.9 K/UL (ref 0.9–3.6)
LYMPHOCYTES # BLD: 1 K/UL (ref 0.8–3.5)
LYMPHOCYTES # BLD: 1 K/UL (ref 0.9–3.6)
LYMPHOCYTES # BLD: 1.1 K/UL (ref 0.8–3.5)
LYMPHOCYTES # BLD: 2 K/UL (ref 0.8–3.5)
LYMPHOCYTES NFR BLD: 5 % (ref 20–51)
LYMPHOCYTES NFR BLD: 6 % (ref 20–51)
LYMPHOCYTES NFR BLD: 6 % (ref 20–51)
LYMPHOCYTES NFR BLD: 6 % (ref 21–52)
LYMPHOCYTES NFR BLD: 7 % (ref 21–52)
MAGNESIUM SERPL-MCNC: 2 MG/DL (ref 1.6–2.6)
MAGNESIUM SERPL-MCNC: 3.5 MG/DL (ref 1.6–2.6)
MCH RBC QN AUTO: 30.4 PG (ref 24–34)
MCH RBC QN AUTO: 30.8 PG (ref 24–34)
MCH RBC QN AUTO: 30.9 PG (ref 24–34)
MCH RBC QN AUTO: 31.1 PG (ref 24–34)
MCH RBC QN AUTO: 31.7 PG (ref 24–34)
MCH RBC QN AUTO: 32.3 PG (ref 24–34)
MCH RBC QN AUTO: 32.5 PG (ref 24–34)
MCH RBC QN AUTO: 33.3 PG (ref 24–34)
MCH RBC QN AUTO: 33.7 PG (ref 24–34)
MCHC RBC AUTO-ENTMCNC: 32.2 G/DL (ref 31–37)
MCHC RBC AUTO-ENTMCNC: 32.7 G/DL (ref 31–37)
MCHC RBC AUTO-ENTMCNC: 32.8 G/DL (ref 31–37)
MCHC RBC AUTO-ENTMCNC: 33.3 G/DL (ref 31–37)
MCHC RBC AUTO-ENTMCNC: 33.4 G/DL (ref 31–37)
MCHC RBC AUTO-ENTMCNC: 33.9 G/DL (ref 31–37)
MCHC RBC AUTO-ENTMCNC: 34.4 G/DL (ref 31–37)
MCHC RBC AUTO-ENTMCNC: 34.5 G/DL (ref 31–37)
MCHC RBC AUTO-ENTMCNC: 34.7 G/DL (ref 31–37)
MCHC RBC AUTO-ENTMCNC: 35.4 G/DL (ref 31–37)
MCHC RBC AUTO-ENTMCNC: 36.2 G/DL (ref 31–37)
MCV RBC AUTO: 89.7 FL (ref 74–97)
MCV RBC AUTO: 89.9 FL (ref 74–97)
MCV RBC AUTO: 90.5 FL (ref 74–97)
MCV RBC AUTO: 90.9 FL (ref 74–97)
MCV RBC AUTO: 91.3 FL (ref 74–97)
MCV RBC AUTO: 94.1 FL (ref 74–97)
MCV RBC AUTO: 94.4 FL (ref 74–97)
MCV RBC AUTO: 95.6 FL (ref 74–97)
MCV RBC AUTO: 96.1 FL (ref 74–97)
MCV RBC AUTO: 96.6 FL (ref 74–97)
MCV RBC AUTO: 97.7 FL (ref 74–97)
MICROALBUMIN UR-MCNC: 4.28 MG/DL (ref 0–3)
MICROALBUMIN/CREAT UR-RTO: 11 MG/G (ref 0–30)
MONOCYTES # BLD: 0.4 K/UL (ref 0.05–1.2)
MONOCYTES # BLD: 0.4 K/UL (ref 0.05–1.2)
MONOCYTES # BLD: 0.7 K/UL (ref 0–1)
MONOCYTES # BLD: 0.9 K/UL (ref 0–1)
MONOCYTES # BLD: 1 K/UL (ref 0–1)
MONOCYTES # BLD: 1.1 K/UL (ref 0–1)
MONOCYTES NFR BLD AUTO: 8 % (ref 2–9)
MONOCYTES NFR BLD: 3 % (ref 3–10)
MONOCYTES NFR BLD: 3 % (ref 3–10)
MONOCYTES NFR BLD: 5 % (ref 2–9)
MONOCYTES NFR BLD: 5 % (ref 2–9)
MONOCYTES NFR BLD: 6 % (ref 2–9)
NEUTS BAND NFR BLD MANUAL: 2 % (ref 0–5)
NEUTS BAND NFR BLD MANUAL: 24 % (ref 0–5)
NEUTS BAND NFR BLD MANUAL: 5 % (ref 0–5)
NEUTS BAND NFR BLD MANUAL: 6 % (ref 0–5)
NEUTS SEG # BLD: 12.3 K/UL (ref 1.8–8)
NEUTS SEG # BLD: 12.8 K/UL (ref 1.8–8)
NEUTS SEG # BLD: 13.1 K/UL (ref 1.8–8)
NEUTS SEG # BLD: 15.4 K/UL (ref 1.8–8)
NEUTS SEG # BLD: 17.4 K/UL (ref 1.8–8)
NEUTS SEG # BLD: 8.4 K/UL (ref 1.8–8)
NEUTS SEG NFR BLD AUTO: 47 % (ref 42–75)
NEUTS SEG NFR BLD: 80 % (ref 42–75)
NEUTS SEG NFR BLD: 83 % (ref 42–75)
NEUTS SEG NFR BLD: 88 % (ref 42–75)
NEUTS SEG NFR BLD: 90 % (ref 40–73)
NEUTS SEG NFR BLD: 91 % (ref 40–73)
NITRITE UR QL STRIP.AUTO: NEGATIVE
NRBC BLD-RTO: 1 PER 100 WBC
NRBC BLD-RTO: 1 PER 100 WBC
P-R INTERVAL, ECG05: 154 MS
P-R INTERVAL, ECG05: 168 MS
P-R INTERVAL, ECG05: 184 MS
PH UR STRIP: 5 [PH] (ref 5–8)
PH UR STRIP: 5.5 [PH] (ref 4.6–8)
PLATELET # BLD AUTO: 109 K/UL (ref 135–420)
PLATELET # BLD AUTO: 116 K/UL (ref 135–420)
PLATELET # BLD AUTO: 125 K/UL (ref 135–420)
PLATELET # BLD AUTO: 136 K/UL (ref 135–420)
PLATELET # BLD AUTO: 145 K/UL (ref 135–420)
PLATELET # BLD AUTO: 173 K/UL (ref 135–420)
PLATELET # BLD AUTO: 183 K/UL (ref 135–420)
PLATELET # BLD AUTO: 210 K/UL (ref 135–420)
PLATELET # BLD AUTO: 216 K/UL (ref 135–420)
PLATELET # BLD AUTO: 293 K/UL (ref 135–420)
PLATELET # BLD AUTO: 95 K/UL (ref 135–420)
PLATELET COMMENTS,PCOM: ABNORMAL
PMV BLD AUTO: 10.4 FL (ref 9.2–11.8)
PMV BLD AUTO: 10.6 FL (ref 9.2–11.8)
PMV BLD AUTO: 10.8 FL (ref 9.2–11.8)
PMV BLD AUTO: 10.9 FL (ref 9.2–11.8)
PMV BLD AUTO: 11.1 FL (ref 9.2–11.8)
PMV BLD AUTO: 11.1 FL (ref 9.2–11.8)
PMV BLD AUTO: 11.5 FL (ref 9.2–11.8)
POTASSIUM SERPL-SCNC: 4.2 MMOL/L (ref 3.5–5.5)
POTASSIUM SERPL-SCNC: 4.3 MMOL/L (ref 3.5–5.5)
POTASSIUM SERPL-SCNC: 4.4 MMOL/L (ref 3.5–5.5)
POTASSIUM SERPL-SCNC: 4.5 MMOL/L (ref 3.5–5.5)
POTASSIUM SERPL-SCNC: 5.3 MMOL/L (ref 3.5–5.5)
POTASSIUM SERPL-SCNC: 5.5 MMOL/L (ref 3.5–5.5)
POTASSIUM SERPL-SCNC: 5.6 MMOL/L (ref 3.5–5.5)
POTASSIUM SERPL-SCNC: 5.6 MMOL/L (ref 3.5–5.5)
POTASSIUM SERPL-SCNC: 6.1 MMOL/L (ref 3.5–5.5)
POTASSIUM SERPL-SCNC: 6.8 MMOL/L (ref 3.5–5.5)
PROT FLD-MCNC: 1417.1 G/DL
PROT SERPL-MCNC: 5.3 G/DL (ref 6.4–8.2)
PROT SERPL-MCNC: 5.4 G/DL (ref 6.4–8.2)
PROT SERPL-MCNC: 5.6 G/DL (ref 6.4–8.2)
PROT SERPL-MCNC: 6.4 G/DL (ref 6.4–8.2)
PROT SERPL-MCNC: 6.8 G/DL (ref 6.4–8.2)
PROT SERPL-MCNC: 7.3 G/DL (ref 6.4–8.2)
PROT UR QL STRIP: NORMAL MG/DL
PROT UR STRIP-MCNC: ABNORMAL MG/DL
PROTHROMBIN TIME: 14.4 SEC (ref 11.5–15.2)
PROTHROMBIN TIME: 15.4 SEC (ref 11.5–15.2)
PROTHROMBIN TIME: 15.8 SEC (ref 11.5–15.2)
PROTHROMBIN TIME: 16.7 SEC (ref 11.5–15.2)
PROTHROMBIN TIME: 17.1 SEC (ref 11.5–15.2)
PROTHROMBIN TIME: 17.1 SEC (ref 11.5–15.2)
PROTHROMBIN TIME: 18.7 SEC (ref 11.5–15.2)
PROTHROMBIN TIME: 31.1 SEC (ref 11.5–15.2)
Q-T INTERVAL, ECG07: 342 MS
Q-T INTERVAL, ECG07: 352 MS
Q-T INTERVAL, ECG07: 392 MS
QRS DURATION, ECG06: 78 MS
QRS DURATION, ECG06: 80 MS
QRS DURATION, ECG06: 98 MS
QTC CALCULATION (BEZET), ECG08: 452 MS
QTC CALCULATION (BEZET), ECG08: 456 MS
QTC CALCULATION (BEZET), ECG08: 476 MS
RBC # BLD AUTO: 2.55 M/UL (ref 4.7–5.5)
RBC # BLD AUTO: 3.03 M/UL (ref 4.7–5.5)
RBC # BLD AUTO: 3.15 M/UL (ref 4.7–5.5)
RBC # BLD AUTO: 3.25 M/UL (ref 4.7–5.5)
RBC # BLD AUTO: 3.35 M/UL (ref 4.7–5.5)
RBC # BLD AUTO: 3.38 M/UL (ref 4.7–5.5)
RBC # BLD AUTO: 3.5 M/UL (ref 4.7–5.5)
RBC # BLD AUTO: 3.51 M/UL (ref 4.7–5.5)
RBC # BLD AUTO: 3.55 M/UL (ref 4.7–5.5)
RBC # BLD AUTO: 3.75 M/UL (ref 4.7–5.5)
RBC # BLD AUTO: 3.86 M/UL (ref 4.7–5.5)
RBC #/AREA URNS HPF: ABNORMAL /HPF (ref 0–5)
RBC MORPH BLD: ABNORMAL
SERVICE CMNT-IMP: NORMAL
SERVICE CMNT-IMP: NORMAL
SODIUM SERPL-SCNC: 124 MMOL/L (ref 136–145)
SODIUM SERPL-SCNC: 126 MMOL/L (ref 136–145)
SODIUM SERPL-SCNC: 127 MMOL/L (ref 136–145)
SODIUM SERPL-SCNC: 129 MMOL/L (ref 136–145)
SODIUM SERPL-SCNC: 129 MMOL/L (ref 136–145)
SODIUM SERPL-SCNC: 131 MMOL/L (ref 136–145)
SODIUM SERPL-SCNC: 137 MMOL/L (ref 136–145)
SODIUM SERPL-SCNC: 139 MMOL/L (ref 136–145)
SODIUM SERPL-SCNC: 140 MMOL/L (ref 136–145)
SODIUM SERPL-SCNC: 141 MMOL/L (ref 136–145)
SP GR UR REFRACTOMETRY: 1.02 (ref 1–1.03)
SP GR UR STRIP: 1.02 (ref 1–1.03)
SPECIMEN EXP DATE BLD: NORMAL
SPECIMEN EXP DATE BLD: NORMAL
SPECIMEN SOURCE FLD: NORMAL
SPECIMEN SOURCE FLD: NORMAL
STATUS OF UNIT,%ST: NORMAL
TRIGL SERPL-MCNC: 86 MG/DL (ref ?–150)
TROPONIN I SERPL-MCNC: <0.02 NG/ML (ref 0–0.04)
TSH SERPL DL<=0.05 MIU/L-ACNC: 1.71 UIU/ML (ref 0.36–3.74)
UA UROBILINOGEN AMB POC: NORMAL (ref 0.2–1)
UNIT DIVISION, %UDIV: 0
URINALYSIS CLARITY POC: NORMAL
URINALYSIS COLOR POC: NORMAL
URINE BLOOD POC: NEGATIVE
URINE LEUKOCYTES POC: NEGATIVE
URINE NITRITES POC: NEGATIVE
UROBILINOGEN UR QL STRIP.AUTO: 1 EU/DL (ref 0.2–1)
VENTRICULAR RATE, ECG03: 101 BPM
VENTRICULAR RATE, ECG03: 105 BPM
VENTRICULAR RATE, ECG03: 89 BPM
VLDLC SERPL CALC-MCNC: 17.2 MG/DL
WBC # BLD AUTO: 1.3 K/UL (ref 4.6–13.2)
WBC # BLD AUTO: 11.7 K/UL (ref 4.6–13.2)
WBC # BLD AUTO: 13.7 K/UL (ref 4.6–13.2)
WBC # BLD AUTO: 14.1 K/UL (ref 4.6–13.2)
WBC # BLD AUTO: 14.7 K/UL (ref 4.6–13.2)
WBC # BLD AUTO: 14.8 K/UL (ref 4.6–13.2)
WBC # BLD AUTO: 16.4 K/UL (ref 4.6–13.2)
WBC # BLD AUTO: 18 K/UL (ref 4.6–13.2)
WBC # BLD AUTO: 19.4 K/UL (ref 4.6–13.2)
WBC # BLD AUTO: 35.1 K/UL (ref 4.6–13.2)
WBC # BLD AUTO: 9.4 K/UL (ref 4.6–13.2)
WBC URNS QL MICRO: ABNORMAL /HPF (ref 0–4)

## 2017-01-01 PROCEDURE — 82962 GLUCOSE BLOOD TEST: CPT

## 2017-01-01 PROCEDURE — 71010 XR CHEST PORT: CPT

## 2017-01-01 PROCEDURE — 80053 COMPREHEN METABOLIC PANEL: CPT | Performed by: EMERGENCY MEDICINE

## 2017-01-01 PROCEDURE — C9132 KCENTRA, PER I.U.: HCPCS | Performed by: EMERGENCY MEDICINE

## 2017-01-01 PROCEDURE — 74176 CT ABD & PELVIS W/O CONTRAST: CPT

## 2017-01-01 PROCEDURE — 85027 COMPLETE CBC AUTOMATED: CPT | Performed by: FAMILY MEDICINE

## 2017-01-01 PROCEDURE — 88334 PATH CONSLTJ SURG CYTO XM EA: CPT | Performed by: RADIOLOGY

## 2017-01-01 PROCEDURE — 74011000250 HC RX REV CODE- 250: Performed by: INTERNAL MEDICINE

## 2017-01-01 PROCEDURE — 85018 HEMOGLOBIN: CPT | Performed by: INTERNAL MEDICINE

## 2017-01-01 PROCEDURE — 36430 TRANSFUSION BLD/BLD COMPNT: CPT

## 2017-01-01 PROCEDURE — 74011250636 HC RX REV CODE- 250/636: Performed by: INTERNAL MEDICINE

## 2017-01-01 PROCEDURE — 77030012965 HC NDL HUBR BBMI -A

## 2017-01-01 PROCEDURE — 74011250636 HC RX REV CODE- 250/636: Performed by: EMERGENCY MEDICINE

## 2017-01-01 PROCEDURE — 36591 DRAW BLOOD OFF VENOUS DEVICE: CPT

## 2017-01-01 PROCEDURE — 80048 BASIC METABOLIC PNL TOTAL CA: CPT | Performed by: INTERNAL MEDICINE

## 2017-01-01 PROCEDURE — 77030029684 HC NEB SM VOL KT MONA -A

## 2017-01-01 PROCEDURE — 76770 US EXAM ABDO BACK WALL COMP: CPT

## 2017-01-01 PROCEDURE — 87040 BLOOD CULTURE FOR BACTERIA: CPT | Performed by: EMERGENCY MEDICINE

## 2017-01-01 PROCEDURE — 74011636637 HC RX REV CODE- 636/637: Performed by: INTERNAL MEDICINE

## 2017-01-01 PROCEDURE — 96361 HYDRATE IV INFUSION ADD-ON: CPT

## 2017-01-01 PROCEDURE — 83036 HEMOGLOBIN GLYCOSYLATED A1C: CPT | Performed by: INTERNAL MEDICINE

## 2017-01-01 PROCEDURE — 49083 ABD PARACENTESIS W/IMAGING: CPT

## 2017-01-01 PROCEDURE — 74011636637 HC RX REV CODE- 636/637: Performed by: EMERGENCY MEDICINE

## 2017-01-01 PROCEDURE — 96374 THER/PROPH/DIAG INJ IV PUSH: CPT

## 2017-01-01 PROCEDURE — 74011250636 HC RX REV CODE- 250/636

## 2017-01-01 PROCEDURE — 81001 URINALYSIS AUTO W/SCOPE: CPT | Performed by: EMERGENCY MEDICINE

## 2017-01-01 PROCEDURE — 99284 EMERGENCY DEPT VISIT MOD MDM: CPT

## 2017-01-01 PROCEDURE — 74011250637 HC RX REV CODE- 250/637: Performed by: INTERNAL MEDICINE

## 2017-01-01 PROCEDURE — 30233N1 TRANSFUSION OF NONAUTOLOGOUS RED BLOOD CELLS INTO PERIPHERAL VEIN, PERCUTANEOUS APPROACH: ICD-10-PCS | Performed by: INTERNAL MEDICINE

## 2017-01-01 PROCEDURE — 82140 ASSAY OF AMMONIA: CPT | Performed by: INTERNAL MEDICINE

## 2017-01-01 PROCEDURE — 77030020186 HC BOOT HL PROTCT SAGE -B

## 2017-01-01 PROCEDURE — 74011636637 HC RX REV CODE- 636/637: Performed by: PHYSICIAN ASSISTANT

## 2017-01-01 PROCEDURE — 77030011256 HC DRSG MEPILEX <16IN NO BORD MOLN -A

## 2017-01-01 PROCEDURE — 94640 AIRWAY INHALATION TREATMENT: CPT

## 2017-01-01 PROCEDURE — 30283B1 TRANSFUSION OF NONAUTOLOGOUS 4-FACTOR PROTHROMBIN COMPLEX CONCENTRATE INTO VEIN, PERCUTANEOUS APPROACH: ICD-10-PCS | Performed by: INTERNAL MEDICINE

## 2017-01-01 PROCEDURE — 82043 UR ALBUMIN QUANTITATIVE: CPT | Performed by: FAMILY MEDICINE

## 2017-01-01 PROCEDURE — 93005 ELECTROCARDIOGRAM TRACING: CPT

## 2017-01-01 PROCEDURE — 83605 ASSAY OF LACTIC ACID: CPT

## 2017-01-01 PROCEDURE — 74011250637 HC RX REV CODE- 250/637: Performed by: EMERGENCY MEDICINE

## 2017-01-01 PROCEDURE — 71010 XR CHEST SNGL V: CPT

## 2017-01-01 PROCEDURE — 88342 IMHCHEM/IMCYTCHM 1ST ANTB: CPT | Performed by: RADIOLOGY

## 2017-01-01 PROCEDURE — C9113 INJ PANTOPRAZOLE SODIUM, VIA: HCPCS | Performed by: EMERGENCY MEDICINE

## 2017-01-01 PROCEDURE — 85025 COMPLETE CBC W/AUTO DIFF WBC: CPT | Performed by: INTERNAL MEDICINE

## 2017-01-01 PROCEDURE — 82945 GLUCOSE OTHER FLUID: CPT | Performed by: INTERNAL MEDICINE

## 2017-01-01 PROCEDURE — 85730 THROMBOPLASTIN TIME PARTIAL: CPT | Performed by: EMERGENCY MEDICINE

## 2017-01-01 PROCEDURE — 85025 COMPLETE CBC W/AUTO DIFF WBC: CPT | Performed by: PHYSICIAN ASSISTANT

## 2017-01-01 PROCEDURE — 65610000006 HC RM INTENSIVE CARE

## 2017-01-01 PROCEDURE — 80053 COMPREHEN METABOLIC PANEL: CPT | Performed by: INTERNAL MEDICINE

## 2017-01-01 PROCEDURE — 86920 COMPATIBILITY TEST SPIN: CPT | Performed by: EMERGENCY MEDICINE

## 2017-01-01 PROCEDURE — 80061 LIPID PANEL: CPT | Performed by: FAMILY MEDICINE

## 2017-01-01 PROCEDURE — 80048 BASIC METABOLIC PNL TOTAL CA: CPT | Performed by: PHYSICIAN ASSISTANT

## 2017-01-01 PROCEDURE — 86900 BLOOD TYPING SEROLOGIC ABO: CPT | Performed by: EMERGENCY MEDICINE

## 2017-01-01 PROCEDURE — 84157 ASSAY OF PROTEIN OTHER: CPT | Performed by: INTERNAL MEDICINE

## 2017-01-01 PROCEDURE — 36415 COLL VENOUS BLD VENIPUNCTURE: CPT | Performed by: INTERNAL MEDICINE

## 2017-01-01 PROCEDURE — 83735 ASSAY OF MAGNESIUM: CPT | Performed by: EMERGENCY MEDICINE

## 2017-01-01 PROCEDURE — 74011000250 HC RX REV CODE- 250: Performed by: EMERGENCY MEDICINE

## 2017-01-01 PROCEDURE — P9016 RBC LEUKOCYTES REDUCED: HCPCS | Performed by: EMERGENCY MEDICINE

## 2017-01-01 PROCEDURE — 36415 COLL VENOUS BLD VENIPUNCTURE: CPT

## 2017-01-01 PROCEDURE — 74011250637 HC RX REV CODE- 250/637: Performed by: NURSE PRACTITIONER

## 2017-01-01 PROCEDURE — 74011000258 HC RX REV CODE- 258: Performed by: EMERGENCY MEDICINE

## 2017-01-01 PROCEDURE — 85025 COMPLETE CBC W/AUTO DIFF WBC: CPT | Performed by: EMERGENCY MEDICINE

## 2017-01-01 PROCEDURE — C9113 INJ PANTOPRAZOLE SODIUM, VIA: HCPCS | Performed by: INTERNAL MEDICINE

## 2017-01-01 PROCEDURE — 74011250637 HC RX REV CODE- 250/637

## 2017-01-01 PROCEDURE — 96365 THER/PROPH/DIAG IV INF INIT: CPT

## 2017-01-01 PROCEDURE — 85610 PROTHROMBIN TIME: CPT | Performed by: EMERGENCY MEDICINE

## 2017-01-01 PROCEDURE — 85027 COMPLETE CBC AUTOMATED: CPT | Performed by: INTERNAL MEDICINE

## 2017-01-01 PROCEDURE — 74011000250 HC RX REV CODE- 250

## 2017-01-01 PROCEDURE — 85730 THROMBOPLASTIN TIME PARTIAL: CPT | Performed by: PHYSICIAN ASSISTANT

## 2017-01-01 PROCEDURE — 74177 CT ABD & PELVIS W/CONTRAST: CPT

## 2017-01-01 PROCEDURE — 88341 IMHCHEM/IMCYTCHM EA ADD ANTB: CPT | Performed by: RADIOLOGY

## 2017-01-01 PROCEDURE — 85610 PROTHROMBIN TIME: CPT | Performed by: INTERNAL MEDICINE

## 2017-01-01 PROCEDURE — 83036 HEMOGLOBIN GLYCOSYLATED A1C: CPT | Performed by: FAMILY MEDICINE

## 2017-01-01 PROCEDURE — 94761 N-INVAS EAR/PLS OXIMETRY MLT: CPT

## 2017-01-01 PROCEDURE — 99153 MOD SED SAME PHYS/QHP EA: CPT

## 2017-01-01 PROCEDURE — 99285 EMERGENCY DEPT VISIT HI MDM: CPT

## 2017-01-01 PROCEDURE — 74011636320 HC RX REV CODE- 636/320: Performed by: INTERNAL MEDICINE

## 2017-01-01 PROCEDURE — 88112 CYTOPATH CELL ENHANCE TECH: CPT | Performed by: INTERNAL MEDICINE

## 2017-01-01 PROCEDURE — 36592 COLLECT BLOOD FROM PICC: CPT

## 2017-01-01 PROCEDURE — 77030014137 HC TY PARCNT TELE -B

## 2017-01-01 PROCEDURE — 82565 ASSAY OF CREATININE: CPT

## 2017-01-01 PROCEDURE — 88307 TISSUE EXAM BY PATHOLOGIST: CPT | Performed by: RADIOLOGY

## 2017-01-01 PROCEDURE — 85027 COMPLETE CBC AUTOMATED: CPT | Performed by: PHYSICIAN ASSISTANT

## 2017-01-01 PROCEDURE — 85018 HEMOGLOBIN: CPT | Performed by: PHYSICIAN ASSISTANT

## 2017-01-01 PROCEDURE — P9016 RBC LEUKOCYTES REDUCED: HCPCS | Performed by: INTERNAL MEDICINE

## 2017-01-01 PROCEDURE — 76450000000

## 2017-01-01 PROCEDURE — C1729 CATH, DRAINAGE: HCPCS

## 2017-01-01 PROCEDURE — 96360 HYDRATION IV INFUSION INIT: CPT

## 2017-01-01 PROCEDURE — 80053 COMPREHEN METABOLIC PANEL: CPT | Performed by: FAMILY MEDICINE

## 2017-01-01 PROCEDURE — 96366 THER/PROPH/DIAG IV INF ADDON: CPT

## 2017-01-01 PROCEDURE — 88333 PATH CONSLTJ SURG CYTO XM 1: CPT | Performed by: RADIOLOGY

## 2017-01-01 PROCEDURE — 88305 TISSUE EXAM BY PATHOLOGIST: CPT | Performed by: INTERNAL MEDICINE

## 2017-01-01 PROCEDURE — 96375 TX/PRO/DX INJ NEW DRUG ADDON: CPT

## 2017-01-01 PROCEDURE — 88313 SPECIAL STAINS GROUP 2: CPT | Performed by: RADIOLOGY

## 2017-01-01 PROCEDURE — 47000 NEEDLE BIOPSY OF LIVER PERQ: CPT

## 2017-01-01 PROCEDURE — 84443 ASSAY THYROID STIM HORMONE: CPT | Performed by: FAMILY MEDICINE

## 2017-01-01 PROCEDURE — 86900 BLOOD TYPING SEROLOGIC ABO: CPT | Performed by: INTERNAL MEDICINE

## 2017-01-01 PROCEDURE — 83690 ASSAY OF LIPASE: CPT | Performed by: EMERGENCY MEDICINE

## 2017-01-01 PROCEDURE — 74011000250 HC RX REV CODE- 250: Performed by: RADIOLOGY

## 2017-01-01 PROCEDURE — 99282 EMERGENCY DEPT VISIT SF MDM: CPT

## 2017-01-01 PROCEDURE — 80076 HEPATIC FUNCTION PANEL: CPT | Performed by: INTERNAL MEDICINE

## 2017-01-01 PROCEDURE — 36415 COLL VENOUS BLD VENIPUNCTURE: CPT | Performed by: FAMILY MEDICINE

## 2017-01-01 PROCEDURE — 82550 ASSAY OF CK (CPK): CPT | Performed by: EMERGENCY MEDICINE

## 2017-01-01 PROCEDURE — 74011000250 HC RX REV CODE- 250: Performed by: PHYSICIAN ASSISTANT

## 2017-01-01 PROCEDURE — 85610 PROTHROMBIN TIME: CPT | Performed by: PHYSICIAN ASSISTANT

## 2017-01-01 PROCEDURE — 85730 THROMBOPLASTIN TIME PARTIAL: CPT | Performed by: INTERNAL MEDICINE

## 2017-01-01 PROCEDURE — 77030013169 SET IV BLD ICUM -A

## 2017-01-01 PROCEDURE — 36415 COLL VENOUS BLD VENIPUNCTURE: CPT | Performed by: PHYSICIAN ASSISTANT

## 2017-01-01 PROCEDURE — 74011000258 HC RX REV CODE- 258: Performed by: INTERNAL MEDICINE

## 2017-01-01 PROCEDURE — 86920 COMPATIBILITY TEST SPIN: CPT | Performed by: INTERNAL MEDICINE

## 2017-01-01 RX ORDER — AMLODIPINE BESYLATE 10 MG/1
TABLET ORAL DAILY
COMMUNITY
End: 2017-01-01 | Stop reason: SDUPTHER

## 2017-01-01 RX ORDER — SODIUM BICARBONATE 1 MEQ/ML
SYRINGE (ML) INTRAVENOUS
Status: COMPLETED
Start: 2017-01-01 | End: 2017-01-01

## 2017-01-01 RX ORDER — ALBUTEROL SULFATE 0.83 MG/ML
2.5 SOLUTION RESPIRATORY (INHALATION)
Status: DISCONTINUED | OUTPATIENT
Start: 2017-01-01 | End: 2017-01-01 | Stop reason: HOSPADM

## 2017-01-01 RX ORDER — ERGOCALCIFEROL 1.25 MG/1
50000 CAPSULE ORAL
COMMUNITY
End: 2017-01-01

## 2017-01-01 RX ORDER — DEXTROSE 50 % IN WATER (D50W) INTRAVENOUS SYRINGE
25-50 AS NEEDED
Status: DISCONTINUED | OUTPATIENT
Start: 2017-01-01 | End: 2017-01-01 | Stop reason: SDUPTHER

## 2017-01-01 RX ORDER — MIDAZOLAM HYDROCHLORIDE 1 MG/ML
1 INJECTION, SOLUTION INTRAMUSCULAR; INTRAVENOUS
Status: DISCONTINUED | OUTPATIENT
Start: 2017-01-01 | End: 2017-01-01 | Stop reason: SDUPTHER

## 2017-01-01 RX ORDER — INSULIN PUMP SYRINGE, 3 ML
EACH MISCELLANEOUS
Qty: 1 KIT | Refills: 0 | Status: CANCELLED | OUTPATIENT
Start: 2017-01-01

## 2017-01-01 RX ORDER — ACETAMINOPHEN 500 MG
1000 TABLET ORAL
Status: COMPLETED | OUTPATIENT
Start: 2017-01-01 | End: 2017-01-01

## 2017-01-01 RX ORDER — NYSTATIN 100000 [USP'U]/ML
1 SUSPENSION ORAL 4 TIMES DAILY
Qty: 60 ML | Refills: 1 | Status: SHIPPED | OUTPATIENT
Start: 2017-01-01 | End: 2017-01-01

## 2017-01-01 RX ORDER — PEN NEEDLE, DIABETIC 31 GX5/16"
NEEDLE, DISPOSABLE MISCELLANEOUS
Qty: 1 PACKAGE | Refills: 0 | Status: SHIPPED | OUTPATIENT
Start: 2017-01-01 | End: 2017-01-01 | Stop reason: SDUPTHER

## 2017-01-01 RX ORDER — INSULIN GLARGINE 100 [IU]/ML
INJECTION, SOLUTION SUBCUTANEOUS
Qty: 100 ML | Refills: 3 | Status: SHIPPED | OUTPATIENT
Start: 2017-01-01

## 2017-01-01 RX ORDER — AMLODIPINE BESYLATE 10 MG/1
10 TABLET ORAL DAILY
Qty: 30 TAB | Refills: 2 | Status: SHIPPED | OUTPATIENT
Start: 2017-01-01 | End: 2017-01-01 | Stop reason: SDUPTHER

## 2017-01-01 RX ORDER — NALOXONE HYDROCHLORIDE 0.4 MG/ML
0.1 INJECTION, SOLUTION INTRAMUSCULAR; INTRAVENOUS; SUBCUTANEOUS
Status: DISCONTINUED | OUTPATIENT
Start: 2017-01-01 | End: 2017-01-01 | Stop reason: SDUPTHER

## 2017-01-01 RX ORDER — DOCUSATE SODIUM 100 MG/1
100 CAPSULE, LIQUID FILLED ORAL 2 TIMES DAILY
Status: DISCONTINUED | OUTPATIENT
Start: 2017-01-01 | End: 2017-01-01 | Stop reason: HOSPADM

## 2017-01-01 RX ORDER — SODIUM CHLORIDE 0.9 % (FLUSH) 0.9 %
5-10 SYRINGE (ML) INJECTION EVERY 8 HOURS
Status: DISCONTINUED | OUTPATIENT
Start: 2017-01-01 | End: 2017-01-01 | Stop reason: SDUPTHER

## 2017-01-01 RX ORDER — MORPHINE SULFATE 4 MG/ML
4 INJECTION, SOLUTION INTRAMUSCULAR; INTRAVENOUS
Status: COMPLETED | OUTPATIENT
Start: 2017-01-01 | End: 2017-01-01

## 2017-01-01 RX ORDER — DEXTROSE 50 % IN WATER (D50W) INTRAVENOUS SYRINGE
25 ONCE
Status: COMPLETED | OUTPATIENT
Start: 2017-01-01 | End: 2017-01-01

## 2017-01-01 RX ORDER — SODIUM CHLORIDE 0.9 % (FLUSH) 0.9 %
5-10 SYRINGE (ML) INJECTION EVERY 8 HOURS
Status: DISCONTINUED | OUTPATIENT
Start: 2017-01-01 | End: 2017-01-01 | Stop reason: HOSPADM

## 2017-01-01 RX ORDER — SODIUM CHLORIDE 9 MG/ML
20 INJECTION, SOLUTION INTRAVENOUS CONTINUOUS
Status: DISCONTINUED | OUTPATIENT
Start: 2017-01-01 | End: 2017-01-01 | Stop reason: HOSPADM

## 2017-01-01 RX ORDER — DEXTROSE 50 % IN WATER (D50W) INTRAVENOUS SYRINGE
25-50 AS NEEDED
Status: DISCONTINUED | OUTPATIENT
Start: 2017-01-01 | End: 2017-01-01 | Stop reason: HOSPADM

## 2017-01-01 RX ORDER — INSULIN ASPART 100 [IU]/ML
3 INJECTION, SOLUTION INTRAVENOUS; SUBCUTANEOUS 3 TIMES DAILY
Qty: 3 PEN | Refills: 0 | Status: SHIPPED | OUTPATIENT
Start: 2017-01-01 | End: 2017-01-01 | Stop reason: SDUPTHER

## 2017-01-01 RX ORDER — EPINEPHRINE 0.1 MG/ML
INJECTION INTRACARDIAC; INTRAVENOUS
Status: DISPENSED
Start: 2017-01-01 | End: 2017-01-01

## 2017-01-01 RX ORDER — ENALAPRIL MALEATE 5 MG/1
TABLET ORAL DAILY
COMMUNITY
End: 2017-01-01

## 2017-01-01 RX ORDER — INSULIN LISPRO 100 [IU]/ML
INJECTION, SOLUTION INTRAVENOUS; SUBCUTANEOUS EVERY 6 HOURS
Status: DISCONTINUED | OUTPATIENT
Start: 2017-01-01 | End: 2017-01-01 | Stop reason: DRUGHIGH

## 2017-01-01 RX ORDER — FLUCONAZOLE 100 MG/1
100 TABLET ORAL DAILY
Status: CANCELLED | OUTPATIENT
Start: 2017-01-01

## 2017-01-01 RX ORDER — SODIUM CHLORIDE 0.9 % (FLUSH) 0.9 %
5-10 SYRINGE (ML) INJECTION AS NEEDED
Status: DISCONTINUED | OUTPATIENT
Start: 2017-01-01 | End: 2017-01-01 | Stop reason: HOSPADM

## 2017-01-01 RX ORDER — FLUMAZENIL 0.1 MG/ML
0.2 INJECTION INTRAVENOUS
Status: DISCONTINUED | OUTPATIENT
Start: 2017-01-01 | End: 2017-01-01 | Stop reason: HOSPADM

## 2017-01-01 RX ORDER — HYDROCODONE BITARTRATE AND ACETAMINOPHEN 5; 325 MG/1; MG/1
1 TABLET ORAL
Refills: 0 | COMMUNITY
Start: 2017-01-01

## 2017-01-01 RX ORDER — HYDROCODONE BITARTRATE AND ACETAMINOPHEN 5; 300 MG/1; MG/1
1 TABLET ORAL
COMMUNITY
End: 2017-01-01

## 2017-01-01 RX ORDER — SODIUM BICARBONATE 1 MEQ/ML
100 SYRINGE (ML) INTRAVENOUS ONCE
Status: COMPLETED | OUTPATIENT
Start: 2017-01-01 | End: 2017-01-01

## 2017-01-01 RX ORDER — PEN NEEDLE, DIABETIC 31 GX5/16"
NEEDLE, DISPOSABLE MISCELLANEOUS
Refills: 0 | COMMUNITY
Start: 2017-01-01 | End: 2017-01-01 | Stop reason: SDUPTHER

## 2017-01-01 RX ORDER — ACETAMINOPHEN 325 MG/1
650 TABLET ORAL ONCE
Status: CANCELLED | OUTPATIENT
Start: 2017-01-01 | End: 2017-01-01

## 2017-01-01 RX ORDER — DEXTROSE 50 % IN WATER (D50W) INTRAVENOUS SYRINGE
25 AS NEEDED
Status: DISCONTINUED | OUTPATIENT
Start: 2017-01-01 | End: 2017-01-01 | Stop reason: HOSPADM

## 2017-01-01 RX ORDER — PEN NEEDLE, DIABETIC 31 GX5/16"
NEEDLE, DISPOSABLE MISCELLANEOUS
Qty: 1 PACKAGE | Refills: 0 | Status: SHIPPED | OUTPATIENT
Start: 2017-01-01

## 2017-01-01 RX ORDER — LANOLIN ALCOHOL/MO/W.PET/CERES
65 CREAM (GRAM) TOPICAL
COMMUNITY

## 2017-01-01 RX ORDER — LACTULOSE 10 G/15ML
SOLUTION ORAL
Refills: 0 | COMMUNITY
Start: 2017-01-01 | End: 2017-01-01

## 2017-01-01 RX ORDER — ZOLPIDEM TARTRATE 5 MG/1
5 TABLET ORAL
Refills: 1 | Status: CANCELLED | OUTPATIENT
Start: 2017-01-01

## 2017-01-01 RX ORDER — INSULIN ASPART 100 [IU]/ML
INJECTION, SOLUTION INTRAVENOUS; SUBCUTANEOUS
Qty: 100 ML | Refills: 3 | Status: SHIPPED | OUTPATIENT
Start: 2017-01-01 | End: 2017-01-01 | Stop reason: SDUPTHER

## 2017-01-01 RX ORDER — FLUMAZENIL 0.1 MG/ML
0.2 INJECTION INTRAVENOUS
Status: DISCONTINUED | OUTPATIENT
Start: 2017-01-01 | End: 2017-01-01 | Stop reason: SDUPTHER

## 2017-01-01 RX ORDER — AMLODIPINE BESYLATE 10 MG/1
10 TABLET ORAL DAILY
Qty: 90 TAB | Refills: 0 | Status: SHIPPED | OUTPATIENT
Start: 2017-01-01 | End: 2017-01-01 | Stop reason: ALTCHOICE

## 2017-01-01 RX ORDER — ZOLPIDEM TARTRATE 5 MG/1
5 TABLET ORAL
Status: DISCONTINUED | OUTPATIENT
Start: 2017-01-01 | End: 2017-01-01 | Stop reason: HOSPADM

## 2017-01-01 RX ORDER — INSULIN LISPRO 100 [IU]/ML
INJECTION, SOLUTION INTRAVENOUS; SUBCUTANEOUS
Status: DISCONTINUED | OUTPATIENT
Start: 2017-01-01 | End: 2017-01-01

## 2017-01-01 RX ORDER — SCOLOPAMINE TRANSDERMAL SYSTEM 1 MG/1
1.5 PATCH, EXTENDED RELEASE TRANSDERMAL
Status: DISCONTINUED | OUTPATIENT
Start: 2017-01-01 | End: 2017-01-01 | Stop reason: HOSPADM

## 2017-01-01 RX ORDER — HEPARIN SODIUM (PORCINE) LOCK FLUSH IV SOLN 100 UNIT/ML 100 UNIT/ML
500 SOLUTION INTRAVENOUS AS NEEDED
Status: DISCONTINUED | OUTPATIENT
Start: 2017-01-01 | End: 2017-01-01 | Stop reason: HOSPADM

## 2017-01-01 RX ORDER — ZOLPIDEM TARTRATE 5 MG/1
1 TABLET ORAL
Refills: 1 | COMMUNITY
Start: 2017-01-01

## 2017-01-01 RX ORDER — INSULIN ASPART 100 [IU]/ML
3 INJECTION, SOLUTION INTRAVENOUS; SUBCUTANEOUS 3 TIMES DAILY
COMMUNITY
End: 2017-01-01 | Stop reason: SDUPTHER

## 2017-01-01 RX ORDER — ONDANSETRON 2 MG/ML
INJECTION INTRAMUSCULAR; INTRAVENOUS
Status: COMPLETED
Start: 2017-01-01 | End: 2017-01-01

## 2017-01-01 RX ORDER — PANTOPRAZOLE SODIUM 40 MG/10ML
40 INJECTION, POWDER, LYOPHILIZED, FOR SOLUTION INTRAVENOUS
Status: COMPLETED | OUTPATIENT
Start: 2017-01-01 | End: 2017-01-01

## 2017-01-01 RX ORDER — ALBUTEROL SULFATE 90 UG/1
AEROSOL, METERED RESPIRATORY (INHALATION)
Status: COMPLETED
Start: 2017-01-01 | End: 2017-01-01

## 2017-01-01 RX ORDER — MORPHINE SULFATE 2 MG/ML
2 INJECTION, SOLUTION INTRAMUSCULAR; INTRAVENOUS
Status: DISCONTINUED | OUTPATIENT
Start: 2017-01-01 | End: 2017-01-01 | Stop reason: HOSPADM

## 2017-01-01 RX ORDER — SODIUM CHLORIDE 9 MG/ML
250 INJECTION, SOLUTION INTRAVENOUS AS NEEDED
Status: DISCONTINUED | OUTPATIENT
Start: 2017-01-01 | End: 2017-01-01 | Stop reason: HOSPADM

## 2017-01-01 RX ORDER — INSULIN GLARGINE 100 [IU]/ML
INJECTION, SOLUTION SUBCUTANEOUS
Qty: 3 EACH | Refills: 2 | Status: SHIPPED | OUTPATIENT
Start: 2017-01-01 | End: 2017-01-01 | Stop reason: SDUPTHER

## 2017-01-01 RX ORDER — LANCETS
EACH MISCELLANEOUS
Qty: 100 EACH | Refills: 6 | Status: SHIPPED | OUTPATIENT
Start: 2017-01-01

## 2017-01-01 RX ORDER — SODIUM CHLORIDE 900 MG/100ML
INJECTION INTRAVENOUS
Status: DISPENSED
Start: 2017-01-01 | End: 2017-01-01

## 2017-01-01 RX ORDER — INSULIN LISPRO 100 [IU]/ML
INJECTION, SOLUTION INTRAVENOUS; SUBCUTANEOUS EVERY 6 HOURS
Status: DISCONTINUED | OUTPATIENT
Start: 2017-01-01 | End: 2017-01-01

## 2017-01-01 RX ORDER — ONDANSETRON 2 MG/ML
4 INJECTION INTRAMUSCULAR; INTRAVENOUS
Status: COMPLETED | OUTPATIENT
Start: 2017-01-01 | End: 2017-01-01

## 2017-01-01 RX ORDER — SODIUM CHLORIDE 0.9 % (FLUSH) 0.9 %
5-10 SYRINGE (ML) INJECTION AS NEEDED
Status: DISCONTINUED | OUTPATIENT
Start: 2017-01-01 | End: 2017-01-01 | Stop reason: SDUPTHER

## 2017-01-01 RX ORDER — ONDANSETRON HYDROCHLORIDE 8 MG/1
TABLET, FILM COATED ORAL
Refills: 2 | COMMUNITY
Start: 2017-01-01 | End: 2017-01-01

## 2017-01-01 RX ORDER — DEXTROSE 50 % IN WATER (D50W) INTRAVENOUS SYRINGE
Status: COMPLETED
Start: 2017-01-01 | End: 2017-01-01

## 2017-01-01 RX ORDER — BISMUTH SUBSALICYLATE 262 MG
1 TABLET,CHEWABLE ORAL DAILY
COMMUNITY

## 2017-01-01 RX ORDER — SODIUM CHLORIDE 9 MG/ML
25 INJECTION, SOLUTION INTRAVENOUS CONTINUOUS
Status: CANCELLED | OUTPATIENT
Start: 2017-01-01 | End: 2017-01-01

## 2017-01-01 RX ORDER — ONDANSETRON 2 MG/ML
4 INJECTION INTRAMUSCULAR; INTRAVENOUS
Status: DISCONTINUED | OUTPATIENT
Start: 2017-01-01 | End: 2017-01-01 | Stop reason: HOSPADM

## 2017-01-01 RX ORDER — ACETAMINOPHEN 325 MG/1
650 TABLET ORAL ONCE
Status: COMPLETED | OUTPATIENT
Start: 2017-01-01 | End: 2017-01-01

## 2017-01-01 RX ORDER — DIPHENHYDRAMINE HCL 25 MG
25 CAPSULE ORAL ONCE
Status: COMPLETED | OUTPATIENT
Start: 2017-01-01 | End: 2017-01-01

## 2017-01-01 RX ORDER — IPRATROPIUM BROMIDE AND ALBUTEROL SULFATE 2.5; .5 MG/3ML; MG/3ML
3 SOLUTION RESPIRATORY (INHALATION)
Status: COMPLETED | OUTPATIENT
Start: 2017-01-01 | End: 2017-01-01

## 2017-01-01 RX ORDER — TRIFLURIDINE AND TIPIRACIL 20; 8.19 MG/1; MG/1
TABLET, FILM COATED ORAL
COMMUNITY
Start: 2017-01-01

## 2017-01-01 RX ORDER — CALCIUM GLUCONATE 94 MG/ML
1 INJECTION, SOLUTION INTRAVENOUS ONCE
Status: COMPLETED | OUTPATIENT
Start: 2017-01-01 | End: 2017-01-01

## 2017-01-01 RX ORDER — FLUCONAZOLE 100 MG/1
100 TABLET ORAL DAILY
COMMUNITY
End: 2017-01-01 | Stop reason: ALTCHOICE

## 2017-01-01 RX ORDER — MAGNESIUM SULFATE 100 %
4 CRYSTALS MISCELLANEOUS AS NEEDED
Status: DISCONTINUED | OUTPATIENT
Start: 2017-01-01 | End: 2017-01-01 | Stop reason: SDUPTHER

## 2017-01-01 RX ORDER — METOPROLOL TARTRATE 25 MG/1
25 TABLET, FILM COATED ORAL 2 TIMES DAILY
COMMUNITY
End: 2017-01-01

## 2017-01-01 RX ORDER — DEXTROSE 50 % IN WATER (D50W) INTRAVENOUS SYRINGE
25
Status: COMPLETED | OUTPATIENT
Start: 2017-01-01 | End: 2017-01-01

## 2017-01-01 RX ORDER — THERA TABS 400 MCG
1 TAB ORAL DAILY
Status: DISCONTINUED | OUTPATIENT
Start: 2017-01-01 | End: 2017-01-01 | Stop reason: HOSPADM

## 2017-01-01 RX ORDER — FUROSEMIDE 20 MG/1
TABLET ORAL
Qty: 30 TAB | Refills: 1 | Status: SHIPPED | OUTPATIENT
Start: 2017-01-01 | End: 2017-01-01

## 2017-01-01 RX ORDER — HYDROCODONE BITARTRATE AND ACETAMINOPHEN 5; 325 MG/1; MG/1
1 TABLET ORAL
Status: DISCONTINUED | OUTPATIENT
Start: 2017-01-01 | End: 2017-01-01 | Stop reason: HOSPADM

## 2017-01-01 RX ORDER — FENTANYL CITRATE 50 UG/ML
50 INJECTION, SOLUTION INTRAMUSCULAR; INTRAVENOUS
Status: DISCONTINUED | OUTPATIENT
Start: 2017-01-01 | End: 2017-01-01 | Stop reason: SDUPTHER

## 2017-01-01 RX ORDER — LEVOFLOXACIN 750 MG/1
TABLET ORAL
Status: COMPLETED
Start: 2017-01-01 | End: 2017-01-01

## 2017-01-01 RX ORDER — HEPARIN SODIUM (PORCINE) LOCK FLUSH IV SOLN 100 UNIT/ML 100 UNIT/ML
SOLUTION INTRAVENOUS
Status: COMPLETED
Start: 2017-01-01 | End: 2017-01-01

## 2017-01-01 RX ORDER — FENTANYL CITRATE 50 UG/ML
50 INJECTION, SOLUTION INTRAMUSCULAR; INTRAVENOUS
Status: DISCONTINUED | OUTPATIENT
Start: 2017-01-01 | End: 2017-01-01

## 2017-01-01 RX ORDER — OXYCODONE HCL 5 MG/5 ML
5-10 SOLUTION, ORAL ORAL
Status: DISCONTINUED | OUTPATIENT
Start: 2017-01-01 | End: 2017-01-01 | Stop reason: HOSPADM

## 2017-01-01 RX ORDER — MAGNESIUM SULFATE 100 %
4 CRYSTALS MISCELLANEOUS AS NEEDED
Status: DISCONTINUED | OUTPATIENT
Start: 2017-01-01 | End: 2017-01-01 | Stop reason: HOSPADM

## 2017-01-01 RX ORDER — NOREPINEPHRINE BITARTRATE 1 MG/ML
INJECTION, SOLUTION INTRAVENOUS
Status: COMPLETED
Start: 2017-01-01 | End: 2017-01-01

## 2017-01-01 RX ORDER — NALOXONE HYDROCHLORIDE 0.4 MG/ML
0.4 INJECTION, SOLUTION INTRAMUSCULAR; INTRAVENOUS; SUBCUTANEOUS AS NEEDED
Status: DISCONTINUED | OUTPATIENT
Start: 2017-01-01 | End: 2017-01-01 | Stop reason: HOSPADM

## 2017-01-01 RX ORDER — OXYCODONE HCL 5 MG/5 ML
5-10 SOLUTION, ORAL ORAL
Status: DISCONTINUED | OUTPATIENT
Start: 2017-01-01 | End: 2017-01-01

## 2017-01-01 RX ORDER — OXYCODONE AND ACETAMINOPHEN 7.5; 325 MG/1; MG/1
1 TABLET ORAL
Status: DISCONTINUED | OUTPATIENT
Start: 2017-01-01 | End: 2017-01-01

## 2017-01-01 RX ORDER — BIOTIN 10 MG
TABLET ORAL
COMMUNITY

## 2017-01-01 RX ORDER — INSULIN PUMP SYRINGE, 3 ML
EACH MISCELLANEOUS
Qty: 1 KIT | Refills: 0 | Status: SHIPPED | OUTPATIENT
Start: 2017-01-01 | End: 2017-01-01 | Stop reason: SDUPTHER

## 2017-01-01 RX ORDER — DIPHENHYDRAMINE HYDROCHLORIDE 50 MG/ML
12.5 INJECTION, SOLUTION INTRAMUSCULAR; INTRAVENOUS
Status: DISCONTINUED | OUTPATIENT
Start: 2017-01-01 | End: 2017-01-01 | Stop reason: HOSPADM

## 2017-01-01 RX ORDER — OCTREOTIDE ACETATE 50 UG/ML
50 INJECTION, SOLUTION INTRAVENOUS; SUBCUTANEOUS
Status: COMPLETED | OUTPATIENT
Start: 2017-01-01 | End: 2017-01-01

## 2017-01-01 RX ORDER — INSULIN PUMP SYRINGE, 3 ML
EACH MISCELLANEOUS
Qty: 1 KIT | Refills: 0 | Status: SHIPPED | OUTPATIENT
Start: 2017-01-01

## 2017-01-01 RX ORDER — INSULIN ASPART 100 [IU]/ML
INJECTION, SOLUTION INTRAVENOUS; SUBCUTANEOUS
Qty: 100 ML | Refills: 3 | Status: SHIPPED | OUTPATIENT
Start: 2017-01-01

## 2017-01-01 RX ORDER — FENTANYL CITRATE 50 UG/ML
INJECTION, SOLUTION INTRAMUSCULAR; INTRAVENOUS
Status: COMPLETED
Start: 2017-01-01 | End: 2017-01-01

## 2017-01-01 RX ORDER — MIDAZOLAM HYDROCHLORIDE 1 MG/ML
1 INJECTION, SOLUTION INTRAMUSCULAR; INTRAVENOUS
Status: DISCONTINUED | OUTPATIENT
Start: 2017-01-01 | End: 2017-01-01

## 2017-01-01 RX ORDER — NOREPINEPHRINE BITARTRATE/D5W 8 MG/250ML
2-16 PLASTIC BAG, INJECTION (ML) INTRAVENOUS
Status: DISCONTINUED | OUTPATIENT
Start: 2017-01-01 | End: 2017-01-01 | Stop reason: HOSPADM

## 2017-01-01 RX ORDER — DIPHENHYDRAMINE HCL 25 MG
25 CAPSULE ORAL ONCE
Status: CANCELLED | OUTPATIENT
Start: 2017-01-01 | End: 2017-01-01

## 2017-01-01 RX ORDER — PEN NEEDLE, DIABETIC 31 GX5/16"
NEEDLE, DISPOSABLE MISCELLANEOUS
Qty: 1 PACKAGE | Refills: 6 | Status: SHIPPED | OUTPATIENT
Start: 2017-01-01 | End: 2017-01-01 | Stop reason: SDUPTHER

## 2017-01-01 RX ORDER — NALOXONE HYDROCHLORIDE 0.4 MG/ML
0.1 INJECTION, SOLUTION INTRAMUSCULAR; INTRAVENOUS; SUBCUTANEOUS
Status: DISCONTINUED | OUTPATIENT
Start: 2017-01-01 | End: 2017-01-01 | Stop reason: HOSPADM

## 2017-01-01 RX ORDER — SODIUM CHLORIDE 9 MG/ML
125 INJECTION, SOLUTION INTRAVENOUS CONTINUOUS
Status: DISCONTINUED | OUTPATIENT
Start: 2017-01-01 | End: 2017-01-01

## 2017-01-01 RX ORDER — FLUCONAZOLE 100 MG/1
100 TABLET ORAL DAILY
OUTPATIENT
Start: 2017-01-01

## 2017-01-01 RX ORDER — ZOLPIDEM TARTRATE 5 MG/1
10 TABLET ORAL ONCE
Status: ACTIVE | OUTPATIENT
Start: 2017-01-01 | End: 2017-01-01

## 2017-01-01 RX ORDER — SODIUM BICARBONATE 1 MEQ/ML
50 SYRINGE (ML) INTRAVENOUS
Status: COMPLETED | OUTPATIENT
Start: 2017-01-01 | End: 2017-01-01

## 2017-01-01 RX ORDER — ALBUTEROL SULFATE 90 UG/1
AEROSOL, METERED RESPIRATORY (INHALATION)
Refills: 0 | COMMUNITY
Start: 2017-01-01

## 2017-01-01 RX ORDER — LANOLIN ALCOHOL/MO/W.PET/CERES
325 CREAM (GRAM) TOPICAL
Status: DISCONTINUED | OUTPATIENT
Start: 2017-01-01 | End: 2017-01-01 | Stop reason: HOSPADM

## 2017-01-01 RX ORDER — LANCETS 28 GAUGE
EACH MISCELLANEOUS
Refills: 3 | COMMUNITY
Start: 2017-01-01

## 2017-01-01 RX ORDER — SODIUM CHLORIDE 0.9 % (FLUSH) 0.9 %
10-40 SYRINGE (ML) INJECTION AS NEEDED
Status: DISCONTINUED | OUTPATIENT
Start: 2017-01-01 | End: 2017-01-01 | Stop reason: HOSPADM

## 2017-01-01 RX ORDER — MIDAZOLAM HYDROCHLORIDE 1 MG/ML
INJECTION, SOLUTION INTRAMUSCULAR; INTRAVENOUS
Status: COMPLETED
Start: 2017-01-01 | End: 2017-01-01

## 2017-01-01 RX ADMIN — SODIUM CHLORIDE 40 MG: 9 INJECTION INTRAMUSCULAR; INTRAVENOUS; SUBCUTANEOUS at 22:33

## 2017-01-01 RX ADMIN — OCTREOTIDE ACETATE 50 MCG/HR: 500 INJECTION, SOLUTION INTRAVENOUS; SUBCUTANEOUS at 18:00

## 2017-01-01 RX ADMIN — NOREPINEPHRINE BITARTRATE: 1 INJECTION, SOLUTION, CONCENTRATE INTRAVENOUS at 20:00

## 2017-01-01 RX ADMIN — SODIUM CHLORIDE 40 MG: 9 INJECTION INTRAMUSCULAR; INTRAVENOUS; SUBCUTANEOUS at 20:16

## 2017-01-01 RX ADMIN — Medication 10 ML: at 06:00

## 2017-01-01 RX ADMIN — Medication 15 MCG/MIN: at 00:01

## 2017-01-01 RX ADMIN — Medication 16 MCG/MIN: at 05:13

## 2017-01-01 RX ADMIN — DEXTROSE MONOHYDRATE 25 G: 500 INJECTION PARENTERAL at 08:41

## 2017-01-01 RX ADMIN — LEVOFLOXACIN: 750 TABLET, FILM COATED ORAL at 03:14

## 2017-01-01 RX ADMIN — INSULIN LISPRO 2 UNITS: 100 INJECTION, SOLUTION INTRAVENOUS; SUBCUTANEOUS at 11:29

## 2017-01-01 RX ADMIN — Medication 10 ML: at 22:36

## 2017-01-01 RX ADMIN — FENTANYL CITRATE 50 MCG: 50 INJECTION, SOLUTION INTRAMUSCULAR; INTRAVENOUS at 10:12

## 2017-01-01 RX ADMIN — Medication 16 MCG/MIN: at 18:39

## 2017-01-01 RX ADMIN — Medication 2 MG: at 00:38

## 2017-01-01 RX ADMIN — OCTREOTIDE ACETATE 50 MCG/HR: 500 INJECTION, SOLUTION INTRAVENOUS; SUBCUTANEOUS at 16:47

## 2017-01-01 RX ADMIN — Medication 2 MG: at 04:32

## 2017-01-01 RX ADMIN — Medication 2 MG: at 13:09

## 2017-01-01 RX ADMIN — DEXTROSE MONOHYDRATE 25 G: 25 INJECTION, SOLUTION INTRAVENOUS at 11:06

## 2017-01-01 RX ADMIN — INSULIN HUMAN 10 UNITS: 100 INJECTION, SOLUTION PARENTERAL at 07:15

## 2017-01-01 RX ADMIN — Medication 10 ML: at 22:41

## 2017-01-01 RX ADMIN — SODIUM CHLORIDE 500 ML: 900 INJECTION, SOLUTION INTRAVENOUS at 01:33

## 2017-01-01 RX ADMIN — Medication 10 ML: at 23:12

## 2017-01-01 RX ADMIN — ALBUTEROL SULFATE 2.5 MG: 2.5 SOLUTION RESPIRATORY (INHALATION) at 22:02

## 2017-01-01 RX ADMIN — ALBUTEROL SULFATE 2.5 MG: 2.5 SOLUTION RESPIRATORY (INHALATION) at 08:09

## 2017-01-01 RX ADMIN — Medication 2 MG: at 22:42

## 2017-01-01 RX ADMIN — ALBUTEROL SULFATE 2.5 MG: 2.5 SOLUTION RESPIRATORY (INHALATION) at 15:23

## 2017-01-01 RX ADMIN — FENTANYL CITRATE 50 MCG: 50 INJECTION, SOLUTION INTRAMUSCULAR; INTRAVENOUS at 10:07

## 2017-01-01 RX ADMIN — OXYCODONE HYDROCHLORIDE AND ACETAMINOPHEN 1 TABLET: 7.5; 325 TABLET ORAL at 10:54

## 2017-01-01 RX ADMIN — ONDANSETRON 4 MG: 2 INJECTION INTRAMUSCULAR; INTRAVENOUS at 10:54

## 2017-01-01 RX ADMIN — Medication 2 MG: at 08:58

## 2017-01-01 RX ADMIN — Medication 10 ML: at 07:35

## 2017-01-01 RX ADMIN — SODIUM BICARBONATE: 84 INJECTION, SOLUTION INTRAVENOUS at 15:00

## 2017-01-01 RX ADMIN — SODIUM CHLORIDE 40 MG: 9 INJECTION INTRAMUSCULAR; INTRAVENOUS; SUBCUTANEOUS at 08:31

## 2017-01-01 RX ADMIN — DEXTROSE 50 % IN WATER (D50W) INTRAVENOUS SYRINGE 25 G: at 08:03

## 2017-01-01 RX ADMIN — Medication 15 MCG/MIN: at 15:26

## 2017-01-01 RX ADMIN — OXYCODONE HYDROCHLORIDE AND ACETAMINOPHEN 1 TABLET: 7.5; 325 TABLET ORAL at 01:06

## 2017-01-01 RX ADMIN — MIDAZOLAM HYDROCHLORIDE 1 MG: 1 INJECTION, SOLUTION INTRAMUSCULAR; INTRAVENOUS at 10:07

## 2017-01-01 RX ADMIN — ALBUTEROL SULFATE 2.5 MG: 2.5 SOLUTION RESPIRATORY (INHALATION) at 14:38

## 2017-01-01 RX ADMIN — IOPAMIDOL 100 ML: 612 INJECTION, SOLUTION INTRAVENOUS at 09:16

## 2017-01-01 RX ADMIN — Medication 16 MCG/MIN: at 16:42

## 2017-01-01 RX ADMIN — INSULIN HUMAN 4 UNITS: 100 INJECTION, SOLUTION PARENTERAL at 20:07

## 2017-01-01 RX ADMIN — Medication 10 ML: at 06:06

## 2017-01-01 RX ADMIN — Medication 2 MG: at 08:32

## 2017-01-01 RX ADMIN — OCTREOTIDE ACETATE 50 MCG/HR: 500 INJECTION, SOLUTION INTRAVENOUS; SUBCUTANEOUS at 19:54

## 2017-01-01 RX ADMIN — Medication 2 MG: at 19:22

## 2017-01-01 RX ADMIN — SODIUM BICARBONATE: 84 INJECTION, SOLUTION INTRAVENOUS at 19:53

## 2017-01-01 RX ADMIN — SODIUM CHLORIDE 125 ML/HR: 900 INJECTION, SOLUTION INTRAVENOUS at 18:20

## 2017-01-01 RX ADMIN — MIDAZOLAM HYDROCHLORIDE 0.5 MG: 1 INJECTION, SOLUTION INTRAMUSCULAR; INTRAVENOUS at 10:22

## 2017-01-01 RX ADMIN — PROTHROMBIN, COAGULATION FACTOR VII HUMAN, COAGULATION FACTOR IX HUMAN, COAGULATION FACTOR X HUMAN, PROTEIN C, PROTEIN S HUMAN, AND WATER 3500 INT'L UNITS: KIT at 19:22

## 2017-01-01 RX ADMIN — DIPHENHYDRAMINE HYDROCHLORIDE 25 MG: 25 CAPSULE ORAL at 08:16

## 2017-01-01 RX ADMIN — HEPARIN SODIUM (PORCINE) LOCK FLUSH IV SOLN 100 UNIT/ML 500 UNITS: 100 SOLUTION at 13:58

## 2017-01-01 RX ADMIN — SODIUM CHLORIDE 40 MG: 9 INJECTION INTRAMUSCULAR; INTRAVENOUS; SUBCUTANEOUS at 09:11

## 2017-01-01 RX ADMIN — Medication 10 ML: at 22:00

## 2017-01-01 RX ADMIN — INSULIN LISPRO 2 UNITS: 100 INJECTION, SOLUTION INTRAVENOUS; SUBCUTANEOUS at 18:07

## 2017-01-01 RX ADMIN — Medication 16 MCG/MIN: at 11:00

## 2017-01-01 RX ADMIN — SODIUM CHLORIDE 125 ML/HR: 900 INJECTION, SOLUTION INTRAVENOUS at 19:26

## 2017-01-01 RX ADMIN — OCTREOTIDE ACETATE 50 MCG/HR: 500 INJECTION, SOLUTION INTRAVENOUS; SUBCUTANEOUS at 05:31

## 2017-01-01 RX ADMIN — CALCIUM GLUCONATE 1 G: 94 INJECTION, SOLUTION INTRAVENOUS at 20:39

## 2017-01-01 RX ADMIN — Medication 16 MCG/MIN: at 15:02

## 2017-01-01 RX ADMIN — Medication 10 ML: at 08:00

## 2017-01-01 RX ADMIN — Medication 5 MCG/MIN: at 03:24

## 2017-01-01 RX ADMIN — DEXTROSE MONOHYDRATE 25 G: 500 INJECTION PARENTERAL at 08:03

## 2017-01-01 RX ADMIN — SODIUM CHLORIDE 8 MG/HR: 900 INJECTION, SOLUTION INTRAVENOUS at 19:26

## 2017-01-01 RX ADMIN — DEXTROSE MONOHYDRATE 25 G: 25 INJECTION, SOLUTION INTRAVENOUS at 07:15

## 2017-01-01 RX ADMIN — ONDANSETRON 4 MG: 2 SOLUTION INTRAMUSCULAR; INTRAVENOUS at 18:06

## 2017-01-01 RX ADMIN — Medication 10 ML: at 14:00

## 2017-01-01 RX ADMIN — Medication 10 ML: at 23:10

## 2017-01-01 RX ADMIN — ACETAMINOPHEN 1000 MG: 500 TABLET ORAL at 00:38

## 2017-01-01 RX ADMIN — Medication 10 ML: at 13:31

## 2017-01-01 RX ADMIN — Medication 10 ML: at 13:32

## 2017-01-01 RX ADMIN — Medication 2 MG: at 20:10

## 2017-01-01 RX ADMIN — ALBUTEROL SULFATE 2.5 MG: 2.5 SOLUTION RESPIRATORY (INHALATION) at 08:41

## 2017-01-01 RX ADMIN — ONDANSETRON 4 MG: 2 INJECTION INTRAMUSCULAR; INTRAVENOUS at 02:31

## 2017-01-01 RX ADMIN — Medication 2 MG: at 02:13

## 2017-01-01 RX ADMIN — INSULIN LISPRO 2 UNITS: 100 INJECTION, SOLUTION INTRAVENOUS; SUBCUTANEOUS at 10:54

## 2017-01-01 RX ADMIN — DOCUSATE SODIUM 100 MG: 100 CAPSULE, LIQUID FILLED ORAL at 10:54

## 2017-01-01 RX ADMIN — SODIUM BICARBONATE: 84 INJECTION, SOLUTION INTRAVENOUS at 02:39

## 2017-01-01 RX ADMIN — OCTREOTIDE ACETATE 50 MCG: 50 INJECTION, SOLUTION INTRAVENOUS; SUBCUTANEOUS at 18:00

## 2017-01-01 RX ADMIN — SODIUM BICARBONATE: 84 INJECTION, SOLUTION INTRAVENOUS at 08:00

## 2017-01-01 RX ADMIN — SODIUM CHLORIDE 8 MG/HR: 900 INJECTION, SOLUTION INTRAVENOUS at 05:07

## 2017-01-01 RX ADMIN — SODIUM CHLORIDE 250 ML: 900 INJECTION, SOLUTION INTRAVENOUS at 08:21

## 2017-01-01 RX ADMIN — ONDANSETRON 4 MG: 2 INJECTION INTRAMUSCULAR; INTRAVENOUS at 17:32

## 2017-01-01 RX ADMIN — DIPHENHYDRAMINE HYDROCHLORIDE 12.5 MG: 50 INJECTION, SOLUTION INTRAMUSCULAR; INTRAVENOUS at 11:56

## 2017-01-01 RX ADMIN — SODIUM BICARBONATE: 84 INJECTION, SOLUTION INTRAVENOUS at 16:46

## 2017-01-01 RX ADMIN — Medication 16 MCG/MIN: at 23:33

## 2017-01-01 RX ADMIN — SODIUM BICARBONATE 50 MEQ: 84 INJECTION INTRAVENOUS at 20:08

## 2017-01-01 RX ADMIN — OCTREOTIDE ACETATE 50 MCG/HR: 500 INJECTION, SOLUTION INTRAVENOUS; SUBCUTANEOUS at 09:00

## 2017-01-01 RX ADMIN — DEXTROSE MONOHYDRATE 12.5 G: 500 INJECTION PARENTERAL at 10:34

## 2017-01-01 RX ADMIN — PANTOPRAZOLE SODIUM 40 MG: 40 INJECTION, POWDER, FOR SOLUTION INTRAVENOUS at 17:59

## 2017-01-01 RX ADMIN — IPRATROPIUM BROMIDE AND ALBUTEROL SULFATE 3 ML: .5; 3 SOLUTION RESPIRATORY (INHALATION) at 00:37

## 2017-01-01 RX ADMIN — DOCUSATE SODIUM 100 MG: 100 CAPSULE, LIQUID FILLED ORAL at 17:19

## 2017-01-01 RX ADMIN — Medication 10 ML: at 00:37

## 2017-01-01 RX ADMIN — OCTREOTIDE ACETATE 50 MCG/HR: 500 INJECTION, SOLUTION INTRAVENOUS; SUBCUTANEOUS at 06:56

## 2017-01-01 RX ADMIN — ALBUTEROL SULFATE 2.5 MG: 2.5 SOLUTION RESPIRATORY (INHALATION) at 14:00

## 2017-01-01 RX ADMIN — FENTANYL CITRATE 25 MCG: 50 INJECTION, SOLUTION INTRAMUSCULAR; INTRAVENOUS at 10:22

## 2017-01-01 RX ADMIN — DEXTROSE MONOHYDRATE 25 G: 25 INJECTION, SOLUTION INTRAVENOUS at 20:08

## 2017-01-01 RX ADMIN — SODIUM CHLORIDE 1000 ML: 900 INJECTION, SOLUTION INTRAVENOUS at 00:38

## 2017-01-01 RX ADMIN — OCTREOTIDE ACETATE 50 MCG/HR: 500 INJECTION, SOLUTION INTRAVENOUS; SUBCUTANEOUS at 02:13

## 2017-01-01 RX ADMIN — OCTREOTIDE ACETATE 50 MCG/HR: 500 INJECTION, SOLUTION INTRAVENOUS; SUBCUTANEOUS at 16:46

## 2017-01-01 RX ADMIN — ALBUTEROL SULFATE: 90 AEROSOL, METERED RESPIRATORY (INHALATION) at 03:14

## 2017-01-01 RX ADMIN — Medication 10 ML: at 13:57

## 2017-01-01 RX ADMIN — INSULIN LISPRO 2 UNITS: 100 INJECTION, SOLUTION INTRAVENOUS; SUBCUTANEOUS at 13:31

## 2017-01-01 RX ADMIN — Medication 15 MCG/MIN: at 09:00

## 2017-01-01 RX ADMIN — Medication 2 MG: at 13:31

## 2017-01-01 RX ADMIN — Medication 10 ML: at 08:20

## 2017-01-01 RX ADMIN — SODIUM CHLORIDE 40 MG: 9 INJECTION INTRAMUSCULAR; INTRAVENOUS; SUBCUTANEOUS at 10:54

## 2017-01-01 RX ADMIN — FENTANYL CITRATE 50 MCG: 50 INJECTION INTRAMUSCULAR; INTRAVENOUS at 10:07

## 2017-01-01 RX ADMIN — SODIUM BICARBONATE 50 MEQ: 84 INJECTION, SOLUTION INTRAVENOUS at 07:27

## 2017-01-01 RX ADMIN — Medication 2 MG: at 12:44

## 2017-01-01 RX ADMIN — MIDAZOLAM HYDROCHLORIDE 1 MG: 1 INJECTION, SOLUTION INTRAMUSCULAR; INTRAVENOUS at 10:12

## 2017-01-01 RX ADMIN — INSULIN HUMAN 5 UNITS: 100 INJECTION, SOLUTION PARENTERAL at 11:06

## 2017-01-01 RX ADMIN — Medication 10 ML: at 18:04

## 2017-01-01 RX ADMIN — Medication 2 MG: at 17:56

## 2017-01-01 RX ADMIN — ACETAMINOPHEN 650 MG: 325 TABLET ORAL at 08:16

## 2017-01-01 RX ADMIN — SODIUM CHLORIDE 40 MG: 9 INJECTION INTRAMUSCULAR; INTRAVENOUS; SUBCUTANEOUS at 08:58

## 2017-01-01 RX ADMIN — DEXTROSE MONOHYDRATE 25 G: 500 INJECTION PARENTERAL at 11:10

## 2017-01-01 RX ADMIN — Medication 4 MG: at 02:31

## 2017-01-01 RX ADMIN — CALCIUM GLUCONATE 1 G: 94 INJECTION, SOLUTION INTRAVENOUS at 11:07

## 2017-01-01 RX ADMIN — SODIUM CHLORIDE 40 MG: 9 INJECTION INTRAMUSCULAR; INTRAVENOUS; SUBCUTANEOUS at 22:39

## 2017-03-28 PROBLEM — I26.99 PULMONARY EMBOLUS (HCC): Status: ACTIVE | Noted: 2017-01-01

## 2017-03-28 PROBLEM — C18.9 MALIGNANT NEOPLASM OF COLON (HCC): Status: ACTIVE | Noted: 2017-01-01

## 2017-03-28 NOTE — PROGRESS NOTES
HISTORY OF PRESENT ILLNESS  Adrian Ferrera is a 47 y.o. male. HPI: Here as a new patient to get establish with new PCP. Recently moved from [facility]. Now under care of Dr. Taylor Savage oncologist for colon cancer metastasis to liver per patient. No prior records available. Currently going for chemo. Has port a cath. Tolerating it well. No specific concern or questions. Has h/o hypertension. Noted vitals stable. Asymptomatic. Denies any headache, dizziness, no chest pain or trouble breathing, no arm or leg weakness. No nausea or vomiting, no weight or appetite changes, no depression or anxiety. No urine or bowel complains, no palpitation, no diaphoresis. Visit Vitals    /88 (BP 1 Location: Left arm, BP Patient Position: Sitting)    Pulse 76    Resp 16    Ht 5' 6.5\" (1.689 m)    Wt 158 lb 12.8 oz (72 kg)    SpO2 99%    BMI 25.25 kg/m2     Review medication list, vitals, problem list,allergies. Review past./ personal/ family / surgical history. ROS: see HPI     Physical Exam   Constitutional: He is oriented to person, place, and time. No distress. Neck: No thyromegaly present. Cardiovascular: Normal rate, regular rhythm and normal heart sounds. Pulmonary/Chest:   CTA   Abdominal: Soft. Bowel sounds are normal. There is no tenderness. Musculoskeletal: He exhibits no edema. Lymphadenopathy:     He has no cervical adenopathy. Neurological: He is oriented to person, place, and time. Psychiatric: His behavior is normal.       ASSESSMENT and PLAN    ICD-10-CM ICD-9-CM    1. Essential hypertension: stable. Low salt diet. Given medication refill. F/u next visit. I10 401.9 amLODIPine (NORVASC) 10 mg tablet   2. Well controlled diabetes mellitus [facility]): checking labs. Diet modification. Will consider further plan after labs.   E11.9 250.00 HEMOGLOBIN A1C WITH EAG      LIPID PANEL      METABOLIC PANEL, COMPREHENSIVE      CBC W/O DIFF      TSH 3RD GENERATION      MICROALBUMIN, UR, RAND W/ MICROALBUMIN/CREA RATIO   3. Malignant neoplasm of colon, unspecified part of colon (HCC)/ with liver metastasis  C18.9 153.9    4. Oral thrush/ from chemo on and off  B37.0 112.0    5. 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 He said his oncologist not aware. He is going to stop by their office and make sure he can get further instructions from oncologist. It was done alternate day due to nose bleed and throat mucus had blood. Will f/u next visit. Advised pt that if he can not get answer from Dr. Denisha Pearce office call back will get in touch with them for further instructions. Will try to obtain records from Dr. Sy Guillen. I26.99 415.19    6. Encounter for immunization Z23 V03.89 TETANUS, DIPHTHERIA TOXOIDS AND ACELLULAR PERTUSSIS VACCINE (TDAP), IN INDIVIDS. >=7, IM   Pt understood and agrees with above plan. Review    Follow-up Disposition:  Return in about 2 months (around 5/28/2017).

## 2017-03-28 NOTE — PATIENT INSTRUCTIONS
Learning About Colon Cancer  What is colon cancer? Colon cancer happens when cells that are not normal grow in your colon. These cells grow together and form tumors. Colon cancer occurs most often in people older than 48. As with other cancers, treatment works best when colon cancer is found early. What happens when you have colon cancer? Most cases begin as polyps, which are small growths inside the colon. These polyps are very common, and most of them do not turn into cancer. Colon cancer usually grows very slowly. It usually takes years for the cancer to become large enough to cause symptoms. If the cancer is not removed and keeps growing, it eventually will invade and destroy nearby tissues and then spread farther, first to nearby lymph nodes. From there it may spread to other parts of the body. What are the symptoms? Colon cancer in its early stages usually doesn't cause any symptoms. Symptoms occur later, when the cancer may be harder to treat. The most common symptoms include:  · Pain in the belly. · Blood in your stool or very dark stools. · A change in your bowel habits, such as more frequent stools or a feeling that your bowels are not emptying completely. · Always feeling tired. How can you prevent colon cancer? Screening tests can find or prevent many cases of colon cancer. They look for a certain disease or condition before any symptoms appear. Screening tests that may find colon cancer early include:  · Stool tests, such as the fecal immunochemical test or the fecal occult blood test.  · Sigmoidoscopy, which lets your doctor look at the inside of the lower part of your colon using a lighted tube. · Colonoscopy, which lets your doctor look at the inside of your entire colon using a thin, flexible tube. Experts recommend colon cancer testing for everyone age 48 and older who has a normal risk for colon cancer.  People with a higher risk, such as those with a strong family history of colon cancer, should be tested sooner. Talk to your doctor about when you should be tested. Here are other things you can do to help prevent colon cancer:  · Watch your weight. Being very overweight may increase your chance of getting colon cancer. · Eat well. Eat more whole grains, fruits, vegetables, poultry, and fish. And eat less red meat, refined grains, and sweets. · Limit drinking. Drink less than 2 alcohol drinks a day. · Get active. Keep up a physically active lifestyle. · Quit smoking. If you smoke cigarettes, quit smoking to reduce your chance of getting colon cancer. How is colon cancer treated? · Surgery is almost always used to treat colon cancer. The cancer is more easily removed when it is found early. · If the cancer has spread into the wall of the colon or farther, you may also need radiation or chemotherapy. Follow-up care is a key part of your treatment and safety. Be sure to make and go to all appointments, and call your doctor if you are having problems. It's also a good idea to know your test results and keep a list of the medicines you take. Where can you learn more? Go to http://brad-amina.info/. Enter M414 in the search box to learn more about \"Learning About Colon Cancer. \"  Current as of: July 26, 2016  Content Version: 11.1  © 6596-8194 [employer]. Care instructions adapted under license by [employer] (which disclaims liability or warranty for this information). If you have questions about a medical condition or this instruction, always ask your healthcare professional. [place] [address] any warranty or liability for your use of this information. Learning About Diabetes Food Guidelines  Your Care Instructions  Meal planning is important to manage diabetes. It helps keep your blood sugar at a target level (which you set with your doctor). You don't have to eat special foods.  You can eat what your family eats, including sweets once in a while. But you do have to pay attention to how often you eat and how much you eat of certain foods. You may want to work with a dietitian or a certified diabetes educator (CDE) to help you plan meals and snacks. A dietitian or CDE can also help you lose weight if that is one of your goals. What should you know about eating carbs? Managing the amount of carbohydrate (carbs) you eat is an important part of healthy meals when you have diabetes. Carbohydrate is found in many foods. · Learn which foods have carbs. And learn the amounts of carbs in different foods. ¨ Bread, cereal, pasta, and rice have about 15 grams of carbs in a serving. A serving is 1 slice of bread (1 ounce), ½ cup of cooked cereal, or 1/3 cup of cooked pasta or rice. ¨ Fruits have 15 grams of carbs in a serving. A serving is 1 small fresh fruit, such as an apple or orange; ½ of a banana; ½ cup of cooked or canned fruit; ½ cup of fruit juice; 1 cup of melon or raspberries; or 2 tablespoons of dried fruit. ¨ Milk and no-sugar-added yogurt have 15 grams of carbs in a serving. A serving is 1 cup of milk or 2/3 cup of no-sugar-added yogurt. ¨ Starchy vegetables have 15 grams of carbs in a serving. A serving is ½ cup of mashed potatoes or sweet potato; 1 cup winter squash; ½ of a small baked potato; ½ cup of cooked beans; or ½ cup cooked corn or green peas. · Learn how much carbs to eat each day and at each meal. A dietitian or CDE can teach you how to keep track of the amount of carbs you eat. This is called carbohydrate counting. · If you are not sure how to count carbohydrate grams, use the Plate Method to plan meals. It is a good, quick way to make sure that you have a balanced meal. It also helps you spread carbs throughout the day. ¨ Divide your plate by types of foods.  Put non-starchy vegetables on half the plate, meat or other protein food on one-quarter of the plate, and a grain or starchy vegetable in the final quarter of the plate. To this you can add a small piece of fruit and 1 cup of milk or yogurt, depending on how many carbs you are supposed to eat at a meal.  · Try to eat about the same amount of carbs at each meal. Do not \"save up\" your daily allowance of carbs to eat at one meal.  · Proteins have very little or no carbs per serving. Examples of proteins are beef, chicken, turkey, fish, eggs, tofu, cheese, cottage cheese, and peanut butter. A serving size of meat is 3 ounces, which is about the size of a deck of cards. Examples of meat substitute serving sizes (equal to 1 ounce of meat) are 1/4 cup of cottage cheese, 1 egg, 1 tablespoon of peanut butter, and ½ cup of tofu. How can you eat out and still eat healthy? · Learn to estimate the serving sizes of foods that have carbohydrate. If you measure food at home, it will be easier to estimate the amount in a serving of restaurant food. · If the meal you order has too much carbohydrate (such as potatoes, corn, or baked beans), ask to have a low-carbohydrate food instead. Ask for a salad or green vegetables. · If you use insulin, check your blood sugar before and after eating out to help you plan how much to eat in the future. · If you eat more carbohydrate at a meal than you had planned, take a walk or do other exercise. This will help lower your blood sugar. What else should you know? · Limit saturated fat, such as the fat from meat and dairy products. This is a healthy choice because people who have diabetes are at higher risk of heart disease. So choose lean cuts of meat and nonfat or low-fat dairy products. Use olive or canola oil instead of butter or shortening when cooking. · Don't skip meals. Your blood sugar may drop too low if you skip meals and take insulin or certain medicines for diabetes. · Check with your doctor before you drink alcohol. Alcohol can cause your blood sugar to drop too low.  Alcohol can also cause a bad reaction if you take certain diabetes medicines. Follow-up care is a key part of your treatment and safety. Be sure to make and go to all appointments, and call your doctor if you are having problems. It's also a good idea to know your test results and keep a list of the medicines you take. Where can you learn more? Go to http://brad-amina.info/. Enter D575 in the search box to learn more about \"Learning About Diabetes Food Guidelines. \"  Current as of: May 23, 2016  Content Version: 11.1  © 8426-0044 [employer]. Care instructions adapted under license by [employer] (which disclaims liability or warranty for this information). If you have questions about a medical condition or this instruction, always ask your healthcare professional. [address] any warranty or liability for your use of this information. Low Sodium Diet (2,000 Milligram): Care Instructions  Your Care Instructions  Too much sodium causes your body to hold on to extra water. This can raise your blood pressure and force your heart and kidneys to work harder. In very serious cases, this could cause you to be put in the hospital. It might even be life-threatening. By limiting sodium, you will feel better and lower your risk of serious problems. The most common source of sodium is salt. People get most of the salt in their diet from canned, prepared, and packaged foods. Fast food and restaurant meals also are very high in sodium. Your doctor will probably limit your sodium to less than 2,000 milligrams (mg) a day. This limit counts all the sodium in prepared and packaged foods and any salt you add to your food. Follow-up care is a key part of your treatment and safety. Be sure to make and go to all appointments, and call your doctor if you are having problems. It's also a good idea to know your test results and keep a list of the medicines you take. How can you care for yourself at home?   Read food labels  · Read labels on cans and food packages. The labels tell you how much sodium is in each serving. Make sure that you look at the serving size. If you eat more than the serving size, you have eaten more sodium. · Food labels also tell you the Percent Daily Value for sodium. Choose products with low Percent Daily Values for sodium. · Be aware that sodium can come in forms other than salt, including monosodium glutamate (MSG), sodium citrate, and sodium bicarbonate (baking soda). MSG is often added to Asian food. When you eat out, you can sometimes ask for food without MSG or added salt. Buy low-sodium foods  · Buy foods that are labeled \"unsalted\" (no salt added), \"sodium-free\" (less than 5 mg of sodium per serving), or \"low-sodium\" (less than 140 mg of sodium per serving). Foods labeled \"reduced-sodium\" and \"light sodium\" may still have too much sodium. Be sure to read the label to see how much sodium you are getting. · Buy fresh vegetables, or frozen vegetables without added sauces. Buy low-sodium versions of canned vegetables, soups, and other canned goods. Prepare low-sodium meals  · Cut back on the amount of salt you use in cooking. This will help you adjust to the taste. Do not add salt after cooking. One teaspoon of salt has about 2,300 mg of sodium. · Take the salt shaker off the table. · Flavor your food with garlic, lemon juice, onion, vinegar, herbs, and spices. Do not use soy sauce, lite soy sauce, steak sauce, onion salt, garlic salt, celery salt, mustard, or ketchup on your food. · Use low-sodium salad dressings, sauces, and ketchup. Or make your own salad dressings and sauces without adding salt. · Use less salt (or none) when recipes call for it. You can often use half the salt a recipe calls for without losing flavor. Other foods such as rice, pasta, and grains do not need added salt. · Rinse canned vegetables, and cook them in fresh water.  This removes somebut not allof the salt.  · Avoid water that is naturally high in sodium or that has been treated with water softeners, which add sodium. Call your local water company to find out the sodium content of your water supply. If you buy bottled water, read the label and choose a sodium-free brand. Avoid high-sodium foods  · Avoid eating:  ¨ Smoked, cured, salted, and canned meat, fish, and poultry. ¨ Ham, chan, hot dogs, and luncheon meats. ¨ Regular, hard, and processed cheese and regular peanut butter. ¨ Crackers with salted tops, and other salted snack foods such as pretzels, chips, and salted popcorn. ¨ Frozen prepared meals, unless labeled low-sodium. ¨ Canned and dried soups, broths, and bouillon, unless labeled sodium-free or low-sodium. ¨ Canned vegetables, unless labeled sodium-free or low-sodium. ¨ Western Reema fries, pizza, tacos, and other fast foods. ¨ Pickles, olives, ketchup, and other condiments, especially soy sauce, unless labeled sodium-free or low-sodium. Where can you learn more? Go to http://brad-amina.info/. Enter P593 in the search box to learn more about \"Low Sodium Diet (2,000 Milligram): Care Instructions. \"  Current as of: July 26, 2016  Content Version: 11.1  © 3626-3226 [employer]. Care instructions adapted under license by [employer] (which disclaims liability or warranty for this information). If you have questions about a medical condition or this instruction, always ask your healthcare professional. [place] [address] any warranty or liability for your use of this information. Deep Vein Thrombosis: Care Instructions  Your Care Instructions    A deep vein thrombosis (DVT) is a blood clot in certain veins of the legs, pelvis, or arms. Blood clots in these veins need to be treated because they can get bigger, break loose, and travel through the bloodstream to the lungs. A blood clot in a lung can be life-threatening.   The doctor may have given you a blood thinner (anticoagulant). A blood thinner can stop the blood clot from growing larger and prevent new clots from forming. You will need to take a blood thinner for 3 to 6 months or longer. The doctor has checked you carefully, but problems can develop later. If you notice any problems or new symptoms, get medical treatment right away. Follow-up care is a key part of your treatment and safety. Be sure to make and go to all appointments, and call your doctor if you are having problems. It's also a good idea to know your test results and keep a list of the medicines you take. How can you care for yourself at home? · Take your medicines exactly as prescribed. Call your doctor if you think you are having a problem with your medicine. · If you are taking a blood thinner, be sure you get instructions about how to take your medicine safely. Blood thinners can cause serious bleeding problems. · Wear compression stockings if your doctor recommends them. These stockings are tighter at the feet than on the legs. They may reduce pain and swelling in your legs. You can get them with a prescription at a medical supply store or at some drugstores. · When you sit, use a pillow to raise the arm or leg that has the blood clot. Try to keep it above the level of your heart. When should you call for help? Call 911 anytime you think you may need emergency care. For example, call if:  · You passed out (lost consciousness). · You have symptoms of a blood clot in your lung (called a pulmonary embolism). These include:  ¨ Sudden chest pain. ¨ Trouble breathing. ¨ Coughing up blood. Call your doctor now or seek immediate medical care if:  · You have new or worse trouble breathing. · You are dizzy or lightheaded, or you feel like you may faint. · You have symptoms of a blood clot in your arm or leg. These may include:  ¨ Pain in the arm, calf, back of the knee, thigh, or groin.   ¨ Redness and swelling in the arm, leg, or groin. Watch closely for changes in your health, and be sure to contact your doctor if:  · You do not get better as expected. Where can you learn more? Go to http://brad-amina.info/. Enter O819 in the search box to learn more about \"Deep Vein Thrombosis: Care Instructions. \"  Current as of: June 4, 2016  Content Version: 11.1  © 4141-2903 [employer]. Care instructions adapted under license by [employer] (which disclaims liability or warranty for this information). If you have questions about a medical condition or this instruction, always ask your healthcare professional. [address] any warranty or liability for your use of this information.

## 2017-03-28 NOTE — PROGRESS NOTES
Chief Complaint   Patient presents with    Establish Care    Medication Refill    Lung Cancer    Other     chest port     Patient states he is here to establish care and for medication refill. Patient is being followed by Dr Jaquan Dalton. Verbal order for Tdap vaccine     Patient given Tdap in L deltoid. Patient tolerated well. No adverse effects noted.     Lot [id number]  Exp 05/20/2019  Mtg 150 [address] [address] [employer]  [id number]

## 2017-03-28 NOTE — MR AVS SNAPSHOT
Visit Information Date & Time Provider Department Dept. Phone Encounter #  
 3/28/2017  9:00 AM Pieter Rajan, 0 [facility] [phone] [id number] Follow-up Instructions Return in about 2 months (around 5/28/2017). Upcoming Health Maintenance Date Due DTaP/Tdap/Td series (1 - Tdap) 3/14/1987 COLONOSCOPY 4/4/2019 Allergies as of 3/28/2017  Review Complete On: 3/28/2017 By: Pieter Rajan MD  
 No Known Allergies Current Immunizations  Never Reviewed No immunizations on file. Not reviewed this visit You Were Diagnosed With   
  
 Codes Comments Essential hypertension    -  Primary ICD-10-CM: I10 
ICD-9-CM: 401.9 Well controlled diabetes mellitus ([facility] [address]     ICD-10-CM: E11.9 ICD-9-CM: 250.00 Malignant neoplasm of colon, unspecified part of colon ([facility] [address]     ICD-10-CM: C18.9 ICD-9-CM: 153.9 Oral thrush     ICD-10-CM: B37.0 ICD-9-CM: 112.0 Other pulmonary embolism without acute cor pulmonale, unspecified chronicity (HCC)     ICD-10-CM: I26.99 
ICD-9-CM: 415.19 Vitals BP Pulse Resp Height(growth percentile) Weight(growth percentile) SpO2  
 130/88 (BP 1 Location: Left arm, BP Patient Position: Sitting) 76 16 5' 6.5\" (1.689 m) 158 lb 12.8 oz (72 kg) 99% BMI Smoking Status 25.25 kg/m2 Former Smoker Vitals History BMI and BSA Data Body Mass Index Body Surface Area  
 25.25 kg/m 2 1.84 m 2 Preferred Pharmacy Pharmacy Name Phone [facility], [address] [place] [address] [phone] Your Updated Medication List  
  
   
This list is accurate as of: 3/28/17 10:12 AM.  Always use your most recent med list.  
  
  
  
  
 ACIDOPHILUS PROBIOTIC 100 million cell-10 mg Cap Generic drug:  acidophilus-pectin, citrus Take  by mouth. amLODIPine 10 mg tablet Commonly known as:  [place] Royals Take 1 Tab by mouth daily. DRISDOL 50,000 unit capsule Generic drug:  ergocalciferol Take 50,000 Units by mouth. ELIQUIS 5 mg tablet Generic drug:  apixaban Take 5 mg by mouth two (2) times a day. enalapril 5 mg tablet Commonly known as:  Padmaja Sensor Take  by mouth daily. fluconazole 100 mg tablet Commonly known as:  DIFLUCAN Take 100 mg by mouth daily. FDA advises cautious prescribing of oral fluconazole in pregnancy. Iron 325 mg (65 mg iron) tablet Generic drug:  ferrous sulfate Take  by mouth Daily (before breakfast). LEVEMIR FLEXPEN 100 unit/mL (3 mL) Inpn Generic drug:  insulin detemir 15 Units by SubCUTAneous route nightly. MEN'S MULTI-VITAMIN tablet Generic drug:  multivitamin Take 1 Tab by mouth daily. metoprolol tartrate 25 mg tablet Commonly known as:  LOPRESSOR Take  by mouth two (2) times a day. NovoLOG Flexpen 100 unit/mL Inpn Generic drug:  insulin aspart 3 Units by SubCUTAneous route three (3) times daily. VICODIN 5-300 mg tablet Generic drug:  HYDROcodone-acetaminophen Take 1 Tab by mouth every eight (8) hours as needed. Prescriptions Sent to Pharmacy Refills  
 amLODIPine (NORVASC) 10 mg tablet 2 Sig: Take 1 Tab by mouth daily. Class: Normal  
 Pharmacy: [address] #: [phone] Route: Oral  
  
Follow-up Instructions Return in about 2 months (around 5/28/2017). To-Do List   
 03/28/2017 Lab:  CBC W/O DIFF   
  
 03/28/2017 Lab:  HEMOGLOBIN A1C WITH EAG   
  
 03/28/2017 Lab:  LIPID PANEL   
  
 03/28/2017 Lab:  METABOLIC PANEL, COMPREHENSIVE   
  
 03/28/2017 Lab:  MICROALBUMIN, UR, RAND W/ MICROALBUMIN/CREA RATIO   
  
 03/28/2017 Lab:  TSH 3RD GENERATION Patient Instructions Learning About Colon Cancer What is colon cancer? Colon cancer happens when cells that are not normal grow in your colon. These cells grow together and form tumors. Colon cancer occurs most often in people older than 48. As with other cancers, treatment works best when colon cancer is found early. What happens when you have colon cancer? Most cases begin as polyps, which are small growths inside the colon. These polyps are very common, and most of them do not turn into cancer. Colon cancer usually grows very slowly. It usually takes years for the cancer to become large enough to cause symptoms. If the cancer is not removed and keeps growing, it eventually will invade and destroy nearby tissues and then spread farther, first to nearby lymph nodes. From there it may spread to other parts of the body. What are the symptoms? Colon cancer in its early stages usually doesn't cause any symptoms. Symptoms occur later, when the cancer may be harder to treat. The most common symptoms include: 
· Pain in the belly. · Blood in your stool or very dark stools. · A change in your bowel habits, such as more frequent stools or a feeling that your bowels are not emptying completely. · Always feeling tired. How can you prevent colon cancer? Screening tests can find or prevent many cases of colon cancer. They look for a certain disease or condition before any symptoms appear. Screening tests that may find colon cancer early include: · Stool tests, such as the fecal immunochemical test or the fecal occult blood test. 
· Sigmoidoscopy, which lets your doctor look at the inside of the lower part of your colon using a lighted tube. · Colonoscopy, which lets your doctor look at the inside of your entire colon using a thin, flexible tube. Experts recommend colon cancer testing for everyone age 48 and older who has a normal risk for colon cancer. People with a higher risk, such as those with a strong family history of colon cancer, should be tested sooner. Talk to your doctor about when you should be tested. Here are other things you can do to help prevent colon cancer: · Watch your weight. Being very overweight may increase your chance of getting colon cancer. · Eat well. Eat more whole grains, fruits, vegetables, poultry, and fish. And eat less red meat, refined grains, and sweets. · Limit drinking. Drink less than 2 alcohol drinks a day. · Get active. Keep up a physically active lifestyle. · Quit smoking. If you smoke cigarettes, quit smoking to reduce your chance of getting colon cancer. How is colon cancer treated? · Surgery is almost always used to treat colon cancer. The cancer is more easily removed when it is found early. · If the cancer has spread into the wall of the colon or farther, you may also need radiation or chemotherapy. Follow-up care is a key part of your treatment and safety. Be sure to make and go to all appointments, and call your doctor if you are having problems. It's also a good idea to know your test results and keep a list of the medicines you take. Where can you learn more? Go to http://brad-amina.info/. Enter M414 in the search box to learn more about \"Learning About Colon Cancer. \" Current as of: July 26, 2016 Content Version: 11.1 © 5093-0308 [employer]. Care instructions adapted under license by [employer] (which disclaims liability or warranty for this information). If you have questions about a medical condition or this instruction, always ask your healthcare professional. [place] [address] any warranty or liability for your use of this information. Learning About Diabetes Food Guidelines Your Care Instructions Meal planning is important to manage diabetes. It helps keep your blood sugar at a target level (which you set with your doctor). You don't have to eat special foods. You can eat what your family eats, including sweets once in a while.  But you do have to pay attention to how often you eat and how much you eat of certain foods. You may want to work with a dietitian or a certified diabetes educator (CDE) to help you plan meals and snacks. A dietitian or CDE can also help you lose weight if that is one of your goals. What should you know about eating carbs? Managing the amount of carbohydrate (carbs) you eat is an important part of healthy meals when you have diabetes. Carbohydrate is found in many foods. · Learn which foods have carbs. And learn the amounts of carbs in different foods. ¨ Bread, cereal, pasta, and rice have about 15 grams of carbs in a serving. A serving is 1 slice of bread (1 ounce), ½ cup of cooked cereal, or 1/3 cup of cooked pasta or rice. ¨ Fruits have 15 grams of carbs in a serving. A serving is 1 small fresh fruit, such as an apple or orange; ½ of a banana; ½ cup of cooked or canned fruit; ½ cup of fruit juice; 1 cup of melon or raspberries; or 2 tablespoons of dried fruit. ¨ Milk and no-sugar-added yogurt have 15 grams of carbs in a serving. A serving is 1 cup of milk or 2/3 cup of no-sugar-added yogurt. ¨ Starchy vegetables have 15 grams of carbs in a serving. A serving is ½ cup of mashed potatoes or sweet potato; 1 cup winter squash; ½ of a small baked potato; ½ cup of cooked beans; or ½ cup cooked corn or green peas. · Learn how much carbs to eat each day and at each meal. A dietitian or CDE can teach you how to keep track of the amount of carbs you eat. This is called carbohydrate counting. · If you are not sure how to count carbohydrate grams, use the Plate Method to plan meals. It is a good, quick way to make sure that you have a balanced meal. It also helps you spread carbs throughout the day. ¨ Divide your plate by types of foods. Put non-starchy vegetables on half the plate, meat or other protein food on one-quarter of the plate, and a grain or starchy vegetable in the final quarter of the plate.  To this you can add a small piece of fruit and 1 cup of milk or yogurt, depending on how many carbs you are supposed to eat at a meal. 
· Try to eat about the same amount of carbs at each meal. Do not \"save up\" your daily allowance of carbs to eat at one meal. 
· Proteins have very little or no carbs per serving. Examples of proteins are beef, chicken, turkey, fish, eggs, tofu, cheese, cottage cheese, and peanut butter. A serving size of meat is 3 ounces, which is about the size of a deck of cards. Examples of meat substitute serving sizes (equal to 1 ounce of meat) are 1/4 cup of cottage cheese, 1 egg, 1 tablespoon of peanut butter, and ½ cup of tofu. How can you eat out and still eat healthy? · Learn to estimate the serving sizes of foods that have carbohydrate. If you measure food at home, it will be easier to estimate the amount in a serving of restaurant food. · If the meal you order has too much carbohydrate (such as potatoes, corn, or baked beans), ask to have a low-carbohydrate food instead. Ask for a salad or green vegetables. · If you use insulin, check your blood sugar before and after eating out to help you plan how much to eat in the future. · If you eat more carbohydrate at a meal than you had planned, take a walk or do other exercise. This will help lower your blood sugar. What else should you know? · Limit saturated fat, such as the fat from meat and dairy products. This is a healthy choice because people who have diabetes are at higher risk of heart disease. So choose lean cuts of meat and nonfat or low-fat dairy products. Use olive or canola oil instead of butter or shortening when cooking. · Don't skip meals. Your blood sugar may drop too low if you skip meals and take insulin or certain medicines for diabetes. · Check with your doctor before you drink alcohol. Alcohol can cause your blood sugar to drop too low. Alcohol can also cause a bad reaction if you take certain diabetes medicines. Follow-up care is a key part of your treatment and safety. Be sure to make and go to all appointments, and call your doctor if you are having problems. It's also a good idea to know your test results and keep a list of the medicines you take. Where can you learn more? Go to http://brad-amina.info/. Enter P535 in the search box to learn more about \"Learning About Diabetes Food Guidelines. \" Current as of: May 23, 2016 Content Version: 11.1 © 0203-5768 WebTV. Care instructions adapted under license by [employer] (which disclaims liability or warranty for this information). If you have questions about a medical condition or this instruction, always ask your healthcare professional. [address] any warranty or liability for your use of this information. Low Sodium Diet (2,000 Milligram): Care Instructions Your Care Instructions Too much sodium causes your body to hold on to extra water. This can raise your blood pressure and force your heart and kidneys to work harder. In very serious cases, this could cause you to be put in the hospital. It might even be life-threatening. By limiting sodium, you will feel better and lower your risk of serious problems. The most common source of sodium is salt. People get most of the salt in their diet from canned, prepared, and packaged foods. Fast food and restaurant meals also are very high in sodium. Your doctor will probably limit your sodium to less than 2,000 milligrams (mg) a day. This limit counts all the sodium in prepared and packaged foods and any salt you add to your food. Follow-up care is a key part of your treatment and safety. Be sure to make and go to all appointments, and call your doctor if you are having problems. It's also a good idea to know your test results and keep a list of the medicines you take. How can you care for yourself at home? Read food labels · Read labels on cans and food packages. The labels tell you how much sodium is in each serving. Make sure that you look at the serving size. If you eat more than the serving size, you have eaten more sodium. · Food labels also tell you the Percent Daily Value for sodium. Choose products with low Percent Daily Values for sodium. · Be aware that sodium can come in forms other than salt, including monosodium glutamate (MSG), sodium citrate, and sodium bicarbonate (baking soda). MSG is often added to Asian food. When you eat out, you can sometimes ask for food without MSG or added salt. Buy low-sodium foods · Buy foods that are labeled \"unsalted\" (no salt added), \"sodium-free\" (less than 5 mg of sodium per serving), or \"low-sodium\" (less than 140 mg of sodium per serving). Foods labeled \"reduced-sodium\" and \"light sodium\" may still have too much sodium. Be sure to read the label to see how much sodium you are getting. · Buy fresh vegetables, or frozen vegetables without added sauces. Buy low-sodium versions of canned vegetables, soups, and other canned goods. Prepare low-sodium meals · Cut back on the amount of salt you use in cooking. This will help you adjust to the taste. Do not add salt after cooking. One teaspoon of salt has about 2,300 mg of sodium. · Take the salt shaker off the table. · Flavor your food with garlic, lemon juice, onion, vinegar, herbs, and spices. Do not use soy sauce, lite soy sauce, steak sauce, onion salt, garlic salt, celery salt, mustard, or ketchup on your food. · Use low-sodium salad dressings, sauces, and ketchup. Or make your own salad dressings and sauces without adding salt. · Use less salt (or none) when recipes call for it. You can often use half the salt a recipe calls for without losing flavor. Other foods such as rice, pasta, and grains do not need added salt. · Rinse canned vegetables, and cook them in fresh water. This removes somebut not allof the salt. · Avoid water that is naturally high in sodium or that has been treated with water softeners, which add sodium. Call your local water company to find out the sodium content of your water supply. If you buy bottled water, read the label and choose a sodium-free brand. Avoid high-sodium foods · Avoid eating: ¨ Smoked, cured, salted, and canned meat, fish, and poultry. ¨ Ham, chan, hot dogs, and luncheon meats. ¨ Regular, hard, and processed cheese and regular peanut butter. ¨ Crackers with salted tops, and other salted snack foods such as pretzels, chips, and salted popcorn. ¨ Frozen prepared meals, unless labeled low-sodium. ¨ Canned and dried soups, broths, and bouillon, unless labeled sodium-free or low-sodium. ¨ Canned vegetables, unless labeled sodium-free or low-sodium. ¨ Western Reema fries, pizza, tacos, and other fast foods. ¨ Pickles, olives, ketchup, and other condiments, especially soy sauce, unless labeled sodium-free or low-sodium. Where can you learn more? Go to http://brad-amina.info/. Enter O595 in the search box to learn more about \"Low Sodium Diet (2,000 Milligram): Care Instructions. \" Current as of: July 26, 2016 Content Version: 11.1 © 1000-6308 OpenCounter. Care instructions adapted under license by [employer] (which disclaims liability or warranty for this information). If you have questions about a medical condition or this instruction, always ask your healthcare professional. [place] [address] any warranty or liability for your use of this information. Deep Vein Thrombosis: Care Instructions Your Care Instructions A deep vein thrombosis (DVT) is a blood clot in certain veins of the legs, pelvis, or arms. Blood clots in these veins need to be treated because they can get bigger, break loose, and travel through the bloodstream to the lungs. A blood clot in a lung can be life-threatening. The doctor may have given you a blood thinner (anticoagulant). A blood thinner can stop the blood clot from growing larger and prevent new clots from forming. You will need to take a blood thinner for 3 to 6 months or longer. The doctor has checked you carefully, but problems can develop later. If you notice any problems or new symptoms, get medical treatment right away. Follow-up care is a key part of your treatment and safety. Be sure to make and go to all appointments, and call your doctor if you are having problems. It's also a good idea to know your test results and keep a list of the medicines you take. How can you care for yourself at home? · Take your medicines exactly as prescribed. Call your doctor if you think you are having a problem with your medicine. · If you are taking a blood thinner, be sure you get instructions about how to take your medicine safely. Blood thinners can cause serious bleeding problems. · Wear compression stockings if your doctor recommends them. These stockings are tighter at the feet than on the legs. They may reduce pain and swelling in your legs. You can get them with a prescription at a medical supply store or at some drugstores. · When you sit, use a pillow to raise the arm or leg that has the blood clot. Try to keep it above the level of your heart. When should you call for help? Call 911 anytime you think you may need emergency care. For example, call if: 
· You passed out (lost consciousness). · You have symptoms of a blood clot in your lung (called a pulmonary embolism). These include: 
¨ Sudden chest pain. ¨ Trouble breathing. ¨ Coughing up blood. Call your doctor now or seek immediate medical care if: 
· You have new or worse trouble breathing. · You are dizzy or lightheaded, or you feel like you may faint. · You have symptoms of a blood clot in your arm or leg. These may include: 
¨ Pain in the arm, calf, back of the knee, thigh, or groin. ¨ Redness and swelling in the arm, leg, or groin. Watch closely for changes in your health, and be sure to contact your doctor if: 
· You do not get better as expected. Where can you learn more? Go to http://brad-amina.info/. Enter E644 in the search box to learn more about \"Deep Vein Thrombosis: Care Instructions. \" Current as of: June 4, 2016 Content Version: 11.1 © 3550-6696 MDconnectME. Care instructions adapted under license by [employer] (which disclaims liability or warranty for this information). If you have questions about a medical condition or this instruction, always ask your healthcare professional. [address] any warranty or liability for your use of this information. Introducing [facility]! Leah Burr introduces elastic.io patient portal. Now you can access parts of your medical record, email your doctor's office, and request medication refills online. 1. In your internet browser, go to [email or web address] 2. Click on the First Time User? Click Here link in the Sign In box. You will see the New Member Sign Up page. 3. Enter your [email or web address] Access Code exactly as it appears below. You will not need to use this code after youve completed the sign-up process. If you do not sign up before the expiration date, you must request a new code. · [email or web address] Access Code: [email or web address] Expires: 6/26/2017  7:59 AM 
 
4. Enter the last four digits of your Social Security Number (xxxx) and Date of Birth (mm/dd/yyyy) as indicated and click Submit. You will be taken to the next sign-up page. 5. Create a "[employer]"t ID. This will be your [email or web address] login ID and cannot be changed, so think of one that is secure and easy to remember. 6. Create a elastic.io password. You can change your password at any time. 7. Enter your Password Reset Question and Answer.  This can be used at a later time if you forget your password. 8. Enter your e-mail address. You will receive e-mail notification when new information is available in [address] 9. Click Sign Up. You can now view and download portions of your medical record. 10. Click the Download Summary menu link to download a portable copy of your medical information. If you have questions, please visit the Frequently Asked Questions section of the [employer] website. Remember, [employer] is NOT to be used for urgent needs. For medical emergencies, dial 911. Now available from your iPhone and Android! Please provide this summary of care documentation to your next provider. If you have any questions after today's visit, please call [phone].

## 2017-04-14 NOTE — TELEPHONE ENCOUNTER
This patient contacted office for the following prescriptions to be filled:    Medication requested :   Requested Prescriptions     Pending Prescriptions Disp Refills    insulin detemir (LEVEMIR FLEXPEN) 100 unit/mL (3 mL) inpn       Sig: 15 Units by SubCUTAneous route nightly.  insulin aspart (NOVOLOG FLEXPEN) 100 unit/mL inpn       Sig: 3 Units by SubCUTAneous route three (3) times daily.  fluconazole (DIFLUCAN) 100 mg tablet       Sig: Take 1 Tab by mouth daily. FDA advises cautious prescribing of oral fluconazole in pregnancy.  amLODIPine (NORVASC) 10 mg tablet 30 Tab 2     Sig: Take 1 Tab by mouth daily.      PCP: [address] Barakat or Print[facility]     Scheduled appointment if not seen by current providers in office: LOV 3/28/2017 f/u 6/2/2017 pt states that he is out of these RX

## 2017-04-17 NOTE — TELEPHONE ENCOUNTER
Spoke with patient (2 verifiers name/) regarding per patient he takes the DIFLUCAN to treat his thrush from getting chemo. Per patient his oncologist told him to get the medication from his PCP. Patient also reminded to have labs done as Dr Marci Alvarado needs to see how his diabetes is doing. Patient stated he is having his labs drawn tomorrow. Patient voiced understanding.

## 2017-04-18 PROBLEM — Z79.4 TYPE 2 DIABETES MELLITUS WITH HYPERGLYCEMIA, WITH LONG-TERM CURRENT USE OF INSULIN (HCC): Status: ACTIVE | Noted: 2017-01-01

## 2017-04-18 PROBLEM — E11.65 TYPE 2 DIABETES MELLITUS WITH HYPERGLYCEMIA, WITH LONG-TERM CURRENT USE OF INSULIN (HCC): Status: ACTIVE | Noted: 2017-01-01

## 2017-04-18 NOTE — TELEPHONE ENCOUNTER
Patient requesting a refill of his contour blood sugar test strips and his lancets. Never prescribed by Dr. Josie Childress. Patient states previously prescribed from his previous PCP in [place]. Please send to [facility] pharmacy.

## 2017-04-18 NOTE — PROGRESS NOTES
Very low wbc count. On treatment for cancer. Please send this labs to oncology per patient verbal approval. Thanks. Will discuss further on follow up visit if none schedule then he need to have it in a month or so. Still pending diabetes test result. Thanks.

## 2017-04-19 NOTE — PROGRESS NOTES
Spoke with patient (2 verifiers name/) regarding per Dr Inocencia La lab showed very low WBC count. Patient informed that Dr Inocencia La knows he's on treatment for Cancer. Patient stated that Dr Sim Mosqueda knows about his low white cell count and is currently at her office being treated for the low white cell count. Patient informed once Dr Inocencia La has reviewed A1C results he will be contacted. Patient voiced understanding.

## 2017-04-19 NOTE — TELEPHONE ENCOUNTER
Spoke with patient (2 verifiers name/) regarding needing insulin needles for his flex pen. Please reorder the needles on his med list as patient stated he is testing 4 times per day so he will need enough with refills. Patient informed Dr Tami Uribe will be given the message. Patient voiced understanding.

## 2017-04-19 NOTE — TELEPHONE ENCOUNTER
Called pharmacy to verify receipt of insulin needles and was informed by the pharmacist that they do have the RX for the needles however they are unable to bill his out of state insurance. Per pharmacist Billey Cooks had given him a josr period to obtain Chuck Jaime and now that time has lapsed. Patient was informed by the pharmacist regarding the above mentioned and informed the patient that the out of pocket cost for the needles would be a little over $40.00 dollars and the patient had voiced to her he didn't want to pay that amount. Pharmacist informed that we would try to reach out to the patient regarding this. Pharmacist voiced understanding. Left message for patient to contact the office regarding this.

## 2017-04-19 NOTE — PROGRESS NOTES
HBA1C is very high. Needs to come in to discuss further. Also doing endocrinology referral. Thanks. Make an appt tomorrow if he can or next week. Thanks.

## 2017-04-19 NOTE — TELEPHONE ENCOUNTER
Spoke with patient (2 verifiers name/) regarding per Dr Brenda Mason since Dr Rola Manjarrez (onc) is treating him for his cancer with chemo he is to ask her about the refill for the Diflucan to treat his thrush. Patient voiced he will speak with her as he is in her office now. Patient voiced understanding.

## 2017-04-19 NOTE — PROGRESS NOTES
Spoke with patient (2 verifiers name/) regarding A1C is very high, 12.1. Patient informed will need to come in to discuss the results. Patient stated that \"there is sugar in the chemo and that Dr Inocencia La needs to know that he does not have any control over that. \"  Patient informed that will need to schedule follow up to discuss further. Patient is scheduled for 2017 at 0915. Patient voiced understanding.

## 2017-04-21 NOTE — PROGRESS NOTES
HISTORY OF PRESENT ILLNESS  Rios Sanchez is a 47 y.o. male. HPI: here to discuss lab result. Noted elevated HBA1C and poorly controlled diabetes. Not checking blood sugar at home as not had testing supply. Has done refill. Said lantus was expensive not bought yet but will see what can be done. Given test strips refill as he said it is one touch. Asymptomatic. Denies any headache, dizziness, no chest pain or trouble breathing, no arm or leg weakness. No nausea or vomiting, no weight or appetite changes, no depression or anxiety. No urine or bowel complains, no palpitation, no diaphoresis. Said since he is on chemo his blood sugar might be high. Back in [place] HBA1C was 7. Currently taking 15 units of levamir at bedtime and 3 units meal time insulin. Adjusted insulin dose today. Also advised to bring blood sugar log. Given log to chart. Visit Vitals    /72 (BP 1 Location: Left arm, BP Patient Position: Sitting)    Pulse 62    Temp 98 °F (36.7 °C) (Oral)    Resp 16    Ht 5' 6.5\" (1.689 m)    Wt 151 lb (68.5 kg)    SpO2 98%    BMI 24.01 kg/m2     Lab Results   Component Value Date/Time    Hemoglobin A1c 12.1 04/18/2017 11:58 AM     Lab Results   Component Value Date/Time    Sodium 141 04/18/2017 11:58 AM    Potassium 4.5 04/18/2017 11:58 AM    Chloride 105 04/18/2017 11:58 AM    CO2 31 04/18/2017 11:58 AM    Anion gap 5 04/18/2017 11:58 AM    Glucose 166 04/18/2017 11:58 AM    BUN 10 04/18/2017 11:58 AM    Creatinine 0.87 04/18/2017 11:58 AM    BUN/Creatinine ratio 11 04/18/2017 11:58 AM    GFR est AA >60 04/18/2017 11:58 AM    GFR est non-AA >60 04/18/2017 11:58 AM    Calcium 8.7 04/18/2017 11:58 AM    Bilirubin, total 0.9 04/18/2017 11:58 AM    AST (SGOT) 40 04/18/2017 11:58 AM    Alk.  phosphatase 235 04/18/2017 11:58 AM    Protein, total 6.8 04/18/2017 11:58 AM    Albumin 2.6 04/18/2017 11:58 AM    Globulin 4.2 04/18/2017 11:58 AM    A-G Ratio 0.6 04/18/2017 11:58 AM    ALT (SGPT) 35 04/18/2017 11:58 AM     Lab Results   Component Value Date/Time    WBC 1.3 04/18/2017 11:58 AM    HGB 11.6 04/18/2017 11:58 AM    HCT 35.4 04/18/2017 11:58 AM    PLATELET 928 61/30/2733 11:58 AM    MCV 94.4 04/18/2017 11:58 AM     Lab Results   Component Value Date/Time    TSH 1.71 04/18/2017 11:58 AM     Lab Results   Component Value Date/Time    Microalbumin/Creat ratio (mg/g creat) 11 04/18/2017 11:58 AM    Microalbumin,urine random 4.28 04/18/2017 11:58 AM     Lab Results   Component Value Date/Time    Cholesterol, total 150 04/18/2017 11:58 AM    HDL Cholesterol 41 04/18/2017 11:58 AM    LDL, calculated 91.8 04/18/2017 11:58 AM    VLDL, calculated 17.2 04/18/2017 11:58 AM    Triglyceride 86 04/18/2017 11:58 AM    CHOL/HDL Ratio 3.7 04/18/2017 11:58 AM     Giving h/o on and off oral thrush from chemo. Not present at this time. Wanted medication refill. Abnormal cbc probably from chemo. Following by oncologist. No unusual fatigue. No fever. No cold or cough. ROS: see HPI     Physical Exam   Constitutional: He is oriented to person, place, and time. No distress. Cardiovascular: Normal rate, regular rhythm and normal heart sounds. Pulmonary/Chest:   CTA   Abdominal: Soft. Bowel sounds are normal. There is no tenderness. Musculoskeletal: He exhibits no edema. Neurological: He is oriented to person, place, and time. Psychiatric: His behavior is normal.       ASSESSMENT and PLAN    ICD-10-CM ICD-9-CM    1. Poorly controlled diabetes mellitus ([facility] [address]): for now went up to 20 units of levamir and 6 units meal time insulin. Diet modification and f/u next visit. Done podiatry and opthalmology referral.   Barak Etienne how to go up on basal insulin. Not on statin. Will consider starting it on next visit as currently on chemo and will observe if no muscle ache or unusual fatigue will consider starting it.  LDL 91.  E11.65 250.00 REFERRAL TO PODIATRY      REFERRAL TO OPHTHALMOLOGY      glucose blood VI test strips (ONETOUCH ULTRA TEST) strip   2. Oral thrush: on and off with chemo. Not present at this time. Given nystatin oral solution to use as needed. B37.0 112.0 nystatin (MYCOSTATIN) 100,000 unit/mL suspension   3. Abnormal CBC/ from chemo : managed by oncology. On Neupogen shots. R79.89 790.6    Pt understood and agrees with above plan. Review    Follow-up Disposition:  Return in about 2 weeks (around 5/5/2017).

## 2017-04-21 NOTE — MR AVS SNAPSHOT
Visit Information Date & Time Provider Department Dept. Phone Encounter #  
 4/21/2017  9:15 AM Felix Padgett, [address] [id number] Follow-up Instructions Return in about 2 weeks (around 5/5/2017). Your Appointments 6/2/2017  1:15 PM  
ROUTINE CARE with Felix Padgett MD  
[facility] ([facility]) Appt Note: routine f/u 2mo; pt r/s from 05/31/17 [address] [place] [address] [address]  
[address] [facility] [address] Upcoming Health Maintenance Date Due  
 FOOT EXAM Q1 3/14/1976 EYE EXAM RETINAL OR DILATED Q1 3/14/1976 Pneumococcal 19-64 Highest Risk (1 of 3 - PCV13) 3/14/1985 HEMOGLOBIN A1C Q6M 10/18/2017 MICROALBUMIN Q1 4/18/2018 LIPID PANEL Q1 4/18/2018 COLONOSCOPY 4/4/2019 DTaP/Tdap/Td series (2 - Td) 3/28/2027 Allergies as of 4/21/2017  Review Complete On: 4/21/2017 By: Felix Padgett MD  
 No Known Allergies Current Immunizations  Reviewed on 4/21/2017 Name Date Tdap 3/28/2017 Reviewed by Felix Padgett MD on 4/21/2017 at 10:02 AM  
You Were Diagnosed With   
  
 Codes Comments Poorly controlled diabetes mellitus ([facility]    -  Primary ICD-10-CM: E11.65 ICD-9-CM: 250.00 Oral thrush     ICD-10-CM: B37.0 ICD-9-CM: 112.0 Abnormal CBC     ICD-10-CM: R79.89 ICD-9-CM: 790.6 Vitals BP Pulse Temp Resp Height(growth percentile) Weight(growth percentile) 102/72 (BP 1 Location: Left arm, BP Patient Position: Sitting) 62 98 °F (36.7 °C) (Oral) 16 5' 6.5\" (1.689 m) 151 lb (68.5 kg) SpO2 BMI Smoking Status 98% 24.01 kg/m2 Former Smoker BMI and BSA Data Body Mass Index Body Surface Area 24.01 kg/m 2 1.79 m 2 Preferred Pharmacy Pharmacy Name Phone [facility], [address] [place] [address] [phone] Your Updated Medication List  
  
   
This list is accurate as of: 4/21/17 10:06 AM.  Always use your most recent med list.  
  
  
  
  
 ACIDOPHILUS PROBIOTIC 100 million cell-10 mg Cap Generic drug:  acidophilus-pectin, citrus Take  by mouth. amLODIPine 10 mg tablet Commonly known as:  [employer] Take 1 Tab by mouth daily. BD INSULIN PEN NEEDLE UF SHORT 31 gauge x 5/16\" Ndle Generic drug:  Insulin Needles (Disposable) Use 4 times a day ELIQUIS 5 mg tablet Generic drug:  apixaban Take 5 mg by mouth two (2) times a day. enalapril 5 mg tablet Commonly known as:  [place] Setting Take  by mouth daily. glucose blood VI test strips strip Commonly known as:  ONETOUCH ULTRA TEST Check 3 times a day * VICODIN 5-300 mg tablet Generic drug:  HYDROcodone-acetaminophen Take 1 Tab by mouth every eight (8) hours as needed. * HYDROcodone-acetaminophen 5-325 mg per tablet Commonly known as:  Catalina Holiday Take 1-2 tablets PO every 4-6 hours as needed for pain control. If over the counter ibuprofen or acetaminophen was suggested, then only take the vicodin for pain not well controlled with the over the counter medication. insulin aspart 100 unit/mL Inpn Commonly known as:  Marleta Folks 3 Units by SubCUTAneous route three (3) times daily. insulin detemir 100 unit/mL (3 mL) Inpn Commonly known as:  LEVEMIR FLEXPEN  
15 Units by SubCUTAneous route nightly. Iron 325 mg (65 mg iron) tablet Generic drug:  ferrous sulfate Take 65 mg by mouth Daily (before breakfast). Lancets Misc Check twice daily MEN'S MULTI-VITAMIN tablet Generic drug:  multivitamin Take 1 Tab by mouth daily. metoprolol tartrate 25 mg tablet Commonly known as:  LOPRESSOR Take 25 mg by mouth two (2) times a day. nystatin 100,000 unit/mL suspension Commonly known as:  MYCOSTATIN Take 5 mL by mouth four (4) times daily. swish and spit ondansetron hcl 8 mg tablet Commonly known as:  ZOFRAN  
TAKE 1 TABLET BY MOUTH EVERY 8 HOURS AS NEEDED * Notice: This list has 2 medication(s) that are the same as other medications prescribed for you. Read the directions carefully, and ask your doctor or other care provider to review them with you. Prescriptions Sent to Pharmacy Refills  
 nystatin (MYCOSTATIN) 100,000 unit/mL suspension 1 Sig: Take 5 mL by mouth four (4) times daily. swish and spit Class: Normal  
 Pharmacy: [address] #: [phone] Route: Oral  
 glucose blood VI test strips (ONETOUCH ULTRA TEST) strip 6 Sig: Check 3 times a day Class: Normal  
 Pharmacy: [address] #: [phone] We Performed the Following REFERRAL TO OPHTHALMOLOGY [REF57 Custom] REFERRAL TO PODIATRY [REF90 Custom] Comments:  
 Please evaluate patient for diabetic foot exam  
  
Follow-up Instructions Return in about 2 weeks (around 5/5/2017). Referral Information Referral ID Referred By Referred To  
  
 [id number] [facility] R Not Available Visits Status Start Date End Date 1 New Request 4/21/17 4/21/18 If your referral has a status of pending review or denied, additional information will be sent to support the outcome of this decision. Referral ID Referred By Referred To  
 [id number] [facility] R Not Available Visits Status Start Date End Date 1 New Request 4/21/17 4/21/18 If your referral has a status of pending review or denied, additional information will be sent to support the outcome of this decision. Patient Instructions Take 20 units of levamir at night time. 6 units meal time insulin. Check blood sugar and bring the log with you. Check fasting sugar if it stays more than 140 then go up on 2 units of levamir every other day. Learning About Diabetes Food Guidelines Your Care Instructions Meal planning is important to manage diabetes. It helps keep your blood sugar at a target level (which you set with your doctor). You don't have to eat special foods. You can eat what your family eats, including sweets once in a while. But you do have to pay attention to how often you eat and how much you eat of certain foods. You may want to work with a dietitian or a certified diabetes educator (CDE) to help you plan meals and snacks. A dietitian or CDE can also help you lose weight if that is one of your goals. What should you know about eating carbs? Managing the amount of carbohydrate (carbs) you eat is an important part of healthy meals when you have diabetes. Carbohydrate is found in many foods. · Learn which foods have carbs. And learn the amounts of carbs in different foods. ¨ Bread, cereal, pasta, and rice have about 15 grams of carbs in a serving. A serving is 1 slice of bread (1 ounce), ½ cup of cooked cereal, or 1/3 cup of cooked pasta or rice. ¨ Fruits have 15 grams of carbs in a serving. A serving is 1 small fresh fruit, such as an apple or orange; ½ of a banana; ½ cup of cooked or canned fruit; ½ cup of fruit juice; 1 cup of melon or raspberries; or 2 tablespoons of dried fruit. ¨ Milk and no-sugar-added yogurt have 15 grams of carbs in a serving. A serving is 1 cup of milk or 2/3 cup of no-sugar-added yogurt. ¨ Starchy vegetables have 15 grams of carbs in a serving. A serving is ½ cup of mashed potatoes or sweet potato; 1 cup winter squash; ½ of a small baked potato; ½ cup of cooked beans; or ½ cup cooked corn or green peas. · Learn how much carbs to eat each day and at each meal. A dietitian or CDE can teach you how to keep track of the amount of carbs you eat. This is called carbohydrate counting.  
· If you are not sure how to count carbohydrate grams, use the Plate Method to plan meals. It is a good, quick way to make sure that you have a balanced meal. It also helps you spread carbs throughout the day. ¨ Divide your plate by types of foods. Put non-starchy vegetables on half the plate, meat or other protein food on one-quarter of the plate, and a grain or starchy vegetable in the final quarter of the plate. To this you can add a small piece of fruit and 1 cup of milk or yogurt, depending on how many carbs you are supposed to eat at a meal. 
· Try to eat about the same amount of carbs at each meal. Do not \"save up\" your daily allowance of carbs to eat at one meal. 
· Proteins have very little or no carbs per serving. Examples of proteins are beef, chicken, turkey, fish, eggs, tofu, cheese, cottage cheese, and peanut butter. A serving size of meat is 3 ounces, which is about the size of a deck of cards. Examples of meat substitute serving sizes (equal to 1 ounce of meat) are 1/4 cup of cottage cheese, 1 egg, 1 tablespoon of peanut butter, and ½ cup of tofu. How can you eat out and still eat healthy? · Learn to estimate the serving sizes of foods that have carbohydrate. If you measure food at home, it will be easier to estimate the amount in a serving of restaurant food. · If the meal you order has too much carbohydrate (such as potatoes, corn, or baked beans), ask to have a low-carbohydrate food instead. Ask for a salad or green vegetables. · If you use insulin, check your blood sugar before and after eating out to help you plan how much to eat in the future. · If you eat more carbohydrate at a meal than you had planned, take a walk or do other exercise. This will help lower your blood sugar. What else should you know? · Limit saturated fat, such as the fat from meat and dairy products. This is a healthy choice because people who have diabetes are at higher risk of heart disease.  So choose lean cuts of meat and nonfat or low-fat dairy products. Use olive or canola oil instead of butter or shortening when cooking. · Don't skip meals. Your blood sugar may drop too low if you skip meals and take insulin or certain medicines for diabetes. · Check with your doctor before you drink alcohol. Alcohol can cause your blood sugar to drop too low. Alcohol can also cause a bad reaction if you take certain diabetes medicines. Follow-up care is a key part of your treatment and safety. Be sure to make and go to all appointments, and call your doctor if you are having problems. It's also a good idea to know your test results and keep a list of the medicines you take. Where can you learn more? Go to http://brad-amina.info/. Enter W309 in the search box to learn more about \"Learning About Diabetes Food Guidelines. \" Current as of: May 23, 2016 Content Version: 11.2 © 9380-9337 Lorena Gaxiola. Care instructions adapted under license by [employer] (which disclaims liability or warranty for this information). If you have questions about a medical condition or this instruction, always ask your healthcare professional. [address] any warranty or liability for your use of this information. Introducing [facility]! Tanis Epley introduces [email or web address] patient portal. Now you can access parts of your medical record, email your doctor's office, and request medication refills online. 1. In your internet browser, go to [email or web address] 2. Click on the First Time User? Click Here link in the Sign In box. You will see the New Member Sign Up page. 3. Enter your [email or web address] Access Code exactly as it appears below. You will not need to use this code after youve completed the sign-up process. If you do not sign up before the expiration date, you must request a new code. · [email or web address] Access Code: [email or web address] Expires: 6/26/2017  7:59 AM 
 
 4. Enter the last four digits of your Social Security Number (xxxx) and Date of Birth (mm/dd/yyyy) as indicated and click Submit. You will be taken to the next sign-up page. 5. Create a worldhistoryproject ID. This will be your worldhistoryproject login ID and cannot be changed, so think of one that is secure and easy to remember. 6. Create a worldhistoryproject password. You can change your password at any time. 7. Enter your Password Reset Question and Answer. This can be used at a later time if you forget your password. 8. Enter your e-mail address. You will receive e-mail notification when new information is available in [address] 9. Click Sign Up. You can now view and download portions of your medical record. 10. Click the Download Summary menu link to download a portable copy of your medical information. If you have questions, please visit the Frequently Asked Questions section of the worldhistoryproject website. Remember, worldhistoryproject is NOT to be used for urgent needs. For medical emergencies, dial 911. Now available from your iPhone and Android! Please provide this summary of care documentation to your next provider. Your primary care clinician is listed as Carol Paredes. If you have any questions after today's visit, please call [phone].

## 2017-04-21 NOTE — PATIENT INSTRUCTIONS
Take 20 units of levamir at night time. 6 units meal time insulin. Check blood sugar and bring the log with you. Check fasting sugar if it stays more than 140 then go up on 2 units of levamir every other day. Learning About Diabetes Food Guidelines  Your Care Instructions  Meal planning is important to manage diabetes. It helps keep your blood sugar at a target level (which you set with your doctor). You don't have to eat special foods. You can eat what your family eats, including sweets once in a while. But you do have to pay attention to how often you eat and how much you eat of certain foods. You may want to work with a dietitian or a certified diabetes educator (CDE) to help you plan meals and snacks. A dietitian or CDE can also help you lose weight if that is one of your goals. What should you know about eating carbs? Managing the amount of carbohydrate (carbs) you eat is an important part of healthy meals when you have diabetes. Carbohydrate is found in many foods. · Learn which foods have carbs. And learn the amounts of carbs in different foods. ¨ Bread, cereal, pasta, and rice have about 15 grams of carbs in a serving. A serving is 1 slice of bread (1 ounce), ½ cup of cooked cereal, or 1/3 cup of cooked pasta or rice. ¨ Fruits have 15 grams of carbs in a serving. A serving is 1 small fresh fruit, such as an apple or orange; ½ of a banana; ½ cup of cooked or canned fruit; ½ cup of fruit juice; 1 cup of melon or raspberries; or 2 tablespoons of dried fruit. ¨ Milk and no-sugar-added yogurt have 15 grams of carbs in a serving. A serving is 1 cup of milk or 2/3 cup of no-sugar-added yogurt. ¨ Starchy vegetables have 15 grams of carbs in a serving. A serving is ½ cup of mashed potatoes or sweet potato; 1 cup winter squash; ½ of a small baked potato; ½ cup of cooked beans; or ½ cup cooked corn or green peas.   · Learn how much carbs to eat each day and at each meal. A dietitian or CDE can teach you how to keep track of the amount of carbs you eat. This is called carbohydrate counting. · If you are not sure how to count carbohydrate grams, use the Plate Method to plan meals. It is a good, quick way to make sure that you have a balanced meal. It also helps you spread carbs throughout the day. ¨ Divide your plate by types of foods. Put non-starchy vegetables on half the plate, meat or other protein food on one-quarter of the plate, and a grain or starchy vegetable in the final quarter of the plate. To this you can add a small piece of fruit and 1 cup of milk or yogurt, depending on how many carbs you are supposed to eat at a meal.  · Try to eat about the same amount of carbs at each meal. Do not \"save up\" your daily allowance of carbs to eat at one meal.  · Proteins have very little or no carbs per serving. Examples of proteins are beef, chicken, turkey, fish, eggs, tofu, cheese, cottage cheese, and peanut butter. A serving size of meat is 3 ounces, which is about the size of a deck of cards. Examples of meat substitute serving sizes (equal to 1 ounce of meat) are 1/4 cup of cottage cheese, 1 egg, 1 tablespoon of peanut butter, and ½ cup of tofu. How can you eat out and still eat healthy? · Learn to estimate the serving sizes of foods that have carbohydrate. If you measure food at home, it will be easier to estimate the amount in a serving of restaurant food. · If the meal you order has too much carbohydrate (such as potatoes, corn, or baked beans), ask to have a low-carbohydrate food instead. Ask for a salad or green vegetables. · If you use insulin, check your blood sugar before and after eating out to help you plan how much to eat in the future. · If you eat more carbohydrate at a meal than you had planned, take a walk or do other exercise. This will help lower your blood sugar. What else should you know? · Limit saturated fat, such as the fat from meat and dairy products.  This is a healthy choice because people who have diabetes are at higher risk of heart disease. So choose lean cuts of meat and nonfat or low-fat dairy products. Use olive or canola oil instead of butter or shortening when cooking. · Don't skip meals. Your blood sugar may drop too low if you skip meals and take insulin or certain medicines for diabetes. · Check with your doctor before you drink alcohol. Alcohol can cause your blood sugar to drop too low. Alcohol can also cause a bad reaction if you take certain diabetes medicines. Follow-up care is a key part of your treatment and safety. Be sure to make and go to all appointments, and call your doctor if you are having problems. It's also a good idea to know your test results and keep a list of the medicines you take. Where can you learn more? Go to http://brad-amina.info/. Enter G672 in the search box to learn more about \"Learning About Diabetes Food Guidelines. \"  Current as of: May 23, 2016  Content Version: 11.2  © 6047-0240 [employer]. Care instructions adapted under license by [employer] (which disclaims liability or warranty for this information). If you have questions about a medical condition or this instruction, always ask your healthcare professional. [address] any warranty or liability for your use of this information.

## 2017-04-21 NOTE — PROGRESS NOTES
1. Have you been to the ER, urgent care clinic since your last visit? Hospitalized since your last visit? No    2. Have you seen or consulted any other health care providers outside of the [address] [place] [address] since your last visit? Include any pap smears or colon screening. No    Last dm eye exam Dr. Alondra Brown in [place], [place] one year ago  Last dm foot exam in [place] - one year ago; he does not recall name of physician. Patient has not had pneumonia vaccine.

## 2017-07-07 NOTE — DISCHARGE INSTRUCTIONS
OUTPATIENT INFUSION CENTER    DISCHARGE INSTRUCTIONS FOR:  BLOOD TRANSFUSION    We hope you are feeling better after your blood transfusion. Some mild tenderness or slight bruising at your IV site is normal.  Avoid lifting or heavy use of that extremity for the rest of the day. Drink plenty of fluids, eat a normal diet and get some rest.    There are some important signs that you need to watch for in case you experience a delayed reaction to the blood you have received. Call your physician immediately if you develop any of the following symptoms:    1. Severe headache or backache;    2. Fever above 100 degrees;    3. Chills;    4. Difficulty breathing;    5.  Blood or red color in urine;    6. The feeling of weakness or constant fatigue;    7. Yellowing of the whites of your eyes or skin (jaundice). If your physician is not available, call or go to the nearest emergency room, or dial 911.     Kashif Brown, Signature: ___________________________ 7/7/2017  Ari Dejesus RN

## 2017-07-07 NOTE — PROGRESS NOTES
[facility] OPI Progress Note    Date: 2017    Name: Lorraine Harris    MRN: [id number]         : 1965      Mr. Kortney Fisher arrived to [facility] at 56. Here for blood transfusion    Mr. Kortney Fisher was assessed and education was provided. Care notes provided. Patient verbalized understanding    Mr. Chana Covington vitals were reviewed. Visit Vitals    /77 (BP 1 Location: Left arm, BP Patient Position: At rest)    Pulse 60    Temp 98 °F (36.7 °C)    Resp 18    SpO2 99%       Right chest medi-port accessed. Positive for blood return and flushes without difficulty. Normal saline initiated at [facility]. Pre-medications tylenol and benadryl PO were administered as ordered. 2units of PRBCs administered as ordered followed by normal saline flush. Mr. Kortney Fisher tolerated infusion without complaints. Will monitor for 1 hour    Patient without signs/symptoms 1 hour post transfusion    Patient Vitals for the past 8 hrs:   Temp Pulse Resp BP SpO2   17 1356 98 °F (36.7 °C) 60 18 113/77 99 %   17 1252 97.7 °F (36.5 °C) 64 18 108/75 -   17 1150 98.5 °F (36.9 °C) 62 18 108/75 -   17 1120 97.9 °F (36.6 °C) 71 18 123/85 -   17 1105 97.8 °F (36.6 °C) 70 16 113/76 -   17 1050 97.3 °F (36.3 °C) 74 16 114/77 -   17 0950 98.4 °F (36.9 °C) 80 16 116/82 -   17 0920 98.7 °F (37.1 °C) 80 16 107/75 -   17 0905 98.6 °F (37 °C) 83 16 108/74 -   17 0849 98.5 °F (36.9 °C) 83 18 107/72 -   17 0805 98.7 °F (37.1 °C) 87 18 115/83 98 %         Brisk flush/blood returns noted from port. Port heparinized per protocol, then de-accessed. Site s signs of infection or infiltration. bandaid applied to site    Discharge instructions reviewed with pt. Pt verbalized understanding. Mr. Kortney Fisher was discharged from [place] [address] in stable condition at 1400.   He [facility]  2017

## 2017-07-10 NOTE — TELEPHONE ENCOUNTER
LOV 04/21/2017  F/U 07/27/2017  Has 8 needles left. Please send to [facility] on [email or web address].

## 2017-07-11 NOTE — TELEPHONE ENCOUNTER
Spoke with patient (2 verifiers name/) regarding insulin needle refill. Patient informed that RX was sent to the pharmacy today. Patient voiced understanding.

## 2017-07-19 NOTE — ED NOTES
The documentation for this period is being entered following the guidelines as defined in the [address] downtime policy by Lindsey Crane RN.

## 2017-07-19 NOTE — ED NOTES
The documentation for this period is being entered following the guidelines as defined in the [facility] policy by Rolly Schroeder RN.

## 2017-07-19 NOTE — ED PROVIDER NOTES
HPI Comments: 47 y.o. Pt c/o of severe chronic coughing with clear mucus and \"rattling sound\" in lungs beginning two days ago. Notes associated mild SOB. Pt has hx of liver CA, DM, and HTN. Last chemotherapy two weeks ago. States he has had a subjective fever at home but no documented fever. Denies muscle aches, chills, or abdominal pain, CP, vomiting, diarrhea. Non-smoker. Past Medical History:   Diagnosis Date    Bleeding     Colon cancer ([facility]     Diabetes ([facility]     HTN (hypertension)     Liver cancer ([facility]        History reviewed. No pertinent surgical history. Family History:   Problem Relation Age of Onset    Cancer Father      lung       Social History     Social History    Marital status: SINGLE     Spouse name: N/A    Number of children: N/A    Years of education: N/A     Occupational History    Not on file. Social History Main Topics    Smoking status: Former Smoker     Types: Cigarettes     Quit date: 3/16/2017    Smokeless tobacco: Never Used    Alcohol use No    Drug use: No    Sexual activity: Yes     Partners: Female     Other Topics Concern    Not on file     Social History Narrative         ALLERGIES: Review of patient's allergies indicates no known allergies. Review of Systems   Constitutional: Positive for fever (Subjective). Negative for chills. HENT: Negative. Negative for congestion. Eyes: Negative. Negative for visual disturbance. Respiratory: Positive for cough and shortness of breath. Negative for chest tightness. Cardiovascular: Negative. Negative for chest pain and leg swelling. Gastrointestinal: Negative. Negative for abdominal pain, diarrhea, nausea and vomiting. Genitourinary: Negative. Negative for difficulty urinating and dysuria. Musculoskeletal: Negative. Negative for back pain and myalgias. Skin: Negative. Negative for rash and wound. Neurological: Negative.   Negative for dizziness, speech difficulty, weakness and light-headedness. Psychiatric/Behavioral: Negative. Negative for self-injury. All other systems reviewed and are negative. Vitals:    07/19/17 0015 07/19/17 0030 07/19/17 0037 07/19/17 0045   BP: 137/88 124/89     Pulse: (!) 105 (!) 105  (!) 103   Resp: 28 (!) 33  24   Temp:   (!) 100.5 °F (38.1 °C)    SpO2: 97% 95%  100%   Weight:       Height:                Physical Exam   Constitutional: He is oriented to person, place, and time. He appears well-developed and well-nourished. No distress. HENT:   Head: Normocephalic and atraumatic. Mouth/Throat: Mucous membranes are dry. Eyes: Conjunctivae and EOM are normal. Pupils are equal, round, and reactive to light. No scleral icterus. Neck: Normal range of motion. Neck supple. No JVD present. No thyromegaly present. Cardiovascular: Regular rhythm, S1 normal and S2 normal.  Tachycardia present. Exam reveals no gallop and no friction rub. No murmur heard. Pulmonary/Chest: Effort normal. No accessory muscle usage. Tachypnea noted. No respiratory distress. He has wheezes. He has rales in the left upper field and the left lower field. He exhibits no retraction. Coughing frequently during exam.   Abdominal: Soft. Normal appearance. He exhibits no distension. There is no tenderness. There is no rigidity, no rebound and no guarding. Musculoskeletal: Normal range of motion. He exhibits no edema or tenderness. Neurological: He is alert and oriented to person, place, and time. He has normal strength. No cranial nerve deficit or sensory deficit. Coordination normal.   Skin: Skin is intact. No rash noted. Warm to touch   Psychiatric: He has a normal mood and affect. His speech is normal and behavior is normal.   Vitals reviewed. MDM  Number of Diagnoses or Management Options  Acute bronchitis, unspecified organism:   Diagnosis management comments: Becca Fitzpatrick is a 47 y.o. Male coming in with productive cough, fever, SOB and wheezing. CXR WNL. No severe respiratory distress. Not neutropenic. Spoke to oncology and they agree with discharge home and close follow up. Patient and wife in agreement with plan. Covered with Levaquin and will provide MDI and steroids. Given STRICT return precautions for worsening symptoms. ED Course     Vitals:  Patient Vitals for the past 12 hrs:   Temp Pulse Resp BP SpO2   07/19/17 0045 - (!) 103 24 - 100 %   07/19/17 0037 (!) 100.5 °F (38.1 °C) - - - -   07/19/17 0030 - (!) 105 (!) 33 124/89 95 %   07/19/17 0015 - (!) 105 28 137/88 97 %   07/19/17 0000 - (!) 101 27 128/76 97 %       Medications Ordered:  Medications   sodium chloride 0.9 % bolus infusion 1,000 mL (0 mL IntraVENous IV Completed 7/19/17 0145)   albuterol-ipratropium (DUO-NEB) 2.5 MG-0.5 MG/3 ML (3 mL Nebulization Given 7/19/17 0037)   acetaminophen (TYLENOL) tablet 1,000 mg (1,000 mg Oral Given 7/19/17 0038)   levoFLOXacin (LEVAQUIN) 750 mg tablet (  Given 7/19/17 0314)   albuterol (PROVENTIL HFA, VENTOLIN HFA, PROAIR HFA) 90 mcg/actuation inhaler (  Given 7/19/17 0314)         Lab Findings:  Recent Results (from the past 12 hour(s))   CBC WITH AUTOMATED DIFF    Collection Time: 07/19/17 12:25 AM   Result Value Ref Range    WBC 11.7 4.6 - 13.2 K/uL    RBC 3.38 (L) 4.70 - 5.50 M/uL    HGB 10.4 (L) 13.0 - 16.0 g/dL    HCT 31.8 (L) 36.0 - 48.0 %    MCV 94.1 74.0 - 97.0 FL    MCH 30.8 24.0 - 34.0 PG    MCHC 32.7 31.0 - 37.0 g/dL    RDW 19.6 (H) 11.6 - 14.5 %    PLATELET 95 (L) 714 - 420 K/uL    MPV 10.9 9.2 - 11.8 FL    NEUTROPHILS 47 42 - 75 %    BAND NEUTROPHILS 24 (H) 0 - 5 %    LYMPHOCYTES 17 (L) 20 - 51 %    MONOCYTES 8 2 - 9 %    EOSINOPHILS 2 0 - 5 %    BASOPHILS 2 0 - 3 %    ABS. NEUTROPHILS 8.4 (H) 1.8 - 8.0 K/UL    ABS. LYMPHOCYTES 2.0 0.8 - 3.5 K/UL    ABS. MONOCYTES 0.9 0 - 1.0 K/UL    ABS. EOSINOPHILS 0.2 0.0 - 0.4 K/UL    ABS.  BASOPHILS 0.2 (H) 0.0 - 0.06 K/UL    DF MANUAL      PLATELET COMMENTS DECREASED PLATELETS      RBC COMMENTS ANISOCYTOSIS  1+       METABOLIC PANEL, COMPREHENSIVE    Collection Time: 07/19/17 12:25 AM   Result Value Ref Range    Sodium 139 136 - 145 mmol/L    Potassium 4.3 3.5 - 5.5 mmol/L    Chloride 106 100 - 108 mmol/L    CO2 26 21 - 32 mmol/L    Anion gap 7 3.0 - 18 mmol/L    Glucose 110 (H) 74 - 99 mg/dL    BUN 9 7.0 - 18 MG/DL    Creatinine 0.61 0.6 - 1.3 MG/DL    BUN/Creatinine ratio 15 12 - 20      GFR est AA >60 >60 ml/min/1.73m2    GFR est non-AA >60 >60 ml/min/1.73m2    Calcium 8.6 8.5 - 10.1 MG/DL    Bilirubin, total 0.8 0.2 - 1.0 MG/DL    ALT (SGPT) 22 16 - 61 U/L    AST (SGOT) 44 (H) 15 - 37 U/L    Alk.  phosphatase 372 (H) 45 - 117 U/L    Protein, total 7.3 6.4 - 8.2 g/dL    Albumin 2.3 (L) 3.4 - 5.0 g/dL    Globulin 5.0 (H) 2.0 - 4.0 g/dL    A-G Ratio 0.5 (L) 0.8 - 1.7     MAGNESIUM    Collection Time: 07/19/17 12:25 AM   Result Value Ref Range    Magnesium 2.0 1.6 - 2.6 mg/dL   CARDIAC PANEL,(CK, CKMB & TROPONIN)    Collection Time: 07/19/17 12:25 AM   Result Value Ref Range    CK 71 39 - 308 U/L    CK - MB <1.0 <3.6 ng/ml    CK-MB Index  0.0 - 4.0 %     CALCULATION NOT PERFORMED WHEN RESULT IS BELOW LINEAR LIMIT    Troponin-I, Qt. <0.02 0.0 - 0.045 NG/ML   PROTHROMBIN TIME + INR    Collection Time: 07/19/17 12:25 AM   Result Value Ref Range    Prothrombin time 15.8 (H) 11.5 - 15.2 sec    INR 1.3 (H) 0.8 - 1.2     PTT    Collection Time: 07/19/17 12:25 AM   Result Value Ref Range    aPTT 35.0 23.0 - 36.4 SEC   POC LACTIC ACID    Collection Time: 07/19/17 12:31 AM   Result Value Ref Range    Lactic Acid (POC) 2.0 0.4 - 2.0 mmol/L   EKG, 12 LEAD, INITIAL    Collection Time: 07/19/17 12:49 AM   Result Value Ref Range    Ventricular Rate 105 BPM    Atrial Rate 105 BPM    P-R Interval 154 ms    QRS Duration 80 ms    Q-T Interval 342 ms    QTC Calculation (Bezet) 452 ms    Calculated P Axis 30 degrees    Calculated R Axis 32 degrees    Calculated T Axis 50 degrees    Diagnosis       Sinus tachycardia  Nonspecific T wave abnormality  Abnormal ECG  No previous ECGs available     URINALYSIS W/ RFLX MICROSCOPIC    Collection Time: 07/19/17  2:00 AM   Result Value Ref Range    Color DARK YELLOW      Appearance CLEAR      Specific gravity 1.021 1.005 - 1.030      pH (UA) 5.0 5.0 - 8.0      Protein TRACE (A) NEG mg/dL    Glucose NEGATIVE  NEG mg/dL    Ketone NEGATIVE  NEG mg/dL    Bilirubin NEGATIVE  NEG      Blood NEGATIVE  NEG      Urobilinogen 1.0 0.2 - 1.0 EU/dL    Nitrites NEGATIVE  NEG      Leukocyte Esterase NEGATIVE  NEG     URINE MICROSCOPIC ONLY    Collection Time: 07/19/17  2:00 AM   Result Value Ref Range    WBC 0 to 2 0 - 4 /hpf    RBC 0 to 2 0 - 5 /hpf    Epithelial cells FEW 0 - 5 /lpf    Bacteria FEW (A) NEG /hpf       EKG Interpretation by ED physician:    12:55 AM: Sinus tachycardia. Rate 105. Non-specific T wave changes in inferior and lateral leads      X-ray, CT or radiology findings or impressions:  No results found. Progress notes, consult notes, or additional procedure notes:    2:06 AM: Dr. Brooklynn Colón, oncology. Discussed results, WBC, fever, and respiratory sx. Agrees that admission not needed. Recommends Levaquin, d/c home, and F/U with oncology. 2:11: Discussed all results with pt and wife. Pt feeling better after albuterol and steriods. Discussed consult with oncology. Discussed Rx instructions, F/U instructions, and red flags for return to the ED. Pt and wife verbalized understanding. Pt stable for discharge home. Diagnosis:   1.  Acute bronchitis, unspecified organism        Disposition: Discharged    Follow-up Information     Follow up With Details Comments Contact Info    Stephanie Ibanez MD Call in 1 day  [address]      [facility] EMERGENCY DEPT  As needed, If symptoms worsen [address]  [phone]          Patient's Medications   Start Taking    No medications on file   Continue Taking    ACIDOPHILUS-PECTIN, CITRUS (ACIDOPHILUS PROBIOTIC) 100 MILLION CELL-10 MG CAP    Take  by mouth. AMLODIPINE (NORVASC) 10 MG TABLET    Take 1 Tab by mouth daily. APIXABAN (ELIQUIS) 5 MG TABLET    Take 5 mg by mouth two (2) times a day. BD INSULIN PEN NEEDLE UF SHORT 31 GAUGE X 5/16\" NDLE    Use 4 times a day    BLOOD-GLUCOSE METER MONITORING KIT    Needs free style glucometer    ENALAPRIL (VASOTEC) 5 MG TABLET    Take  by mouth daily. FERROUS SULFATE (IRON) 325 MG (65 MG IRON) TABLET    Take 65 mg by mouth Daily (before breakfast). GLUCOSE BLOOD VI TEST STRIPS (FREESTYLE TEST) STRIP    Check 3 times a day    HYDROCODONE-ACETAMINOPHEN (NORCO) 5-325 MG PER TABLET    Take 1-2 tablets PO every 4-6 hours as needed for pain control. If over the counter ibuprofen or acetaminophen was suggested, then only take the vicodin for pain not well controlled with the over the counter medication. HYDROCODONE-ACETAMINOPHEN (VICODIN) 5-300 MG TABLET    Take 1 Tab by mouth every eight (8) hours as needed. INSULIN GLARGINE (LANTUS SOLOSTAR) 100 UNIT/ML (3 ML) PEN    20 units at bedtime    LANCETS MISC    Check twice daily    METOPROLOL TARTRATE (LOPRESSOR) 25 MG TABLET    Take 25 mg by mouth two (2) times a day. MULTIVITAMIN (MEN'S MULTI-VITAMIN) TABLET    Take 1 Tab by mouth daily. NOVOLOG FLEXPEN 100 UNIT/ML INPN    INJECT 3 UNITS BENEATH THE SKIN 3 TIMES DAILY    NYSTATIN (MYCOSTATIN) 100,000 UNIT/ML SUSPENSION    Take 5 mL by mouth four (4) times daily.  swish and spit    ONDANSETRON HCL (ZOFRAN) 8 MG TABLET    TAKE 1 TABLET BY MOUTH EVERY 8 HOURS AS NEEDED   These Medications have changed    No medications on file   Stop Taking    No medications on file       Scribe [address] acting as a scribe for and in the presence of William Mae MD      July 19, 2017 at 4:33 AM       Provider Attestation:      I personally performed the services described in the documentation, reviewed the documentation, as recorded by the scribe in my presence, and it accurately and completely records my words and actions. Amelie Mendoza MD      Signed by: Robert Washington, July 19, 2017 at 4:33 AM        I, Leena Jarvis, acting as a scribe for and in the presence of Dr. Amelie Mendoza MD    Provider Attestation  I personally performed the services described in the documentation, reviewed the documentation, as recorded by the scribe in my presence, and it accurately and completely records my words and actions.      Signed By: robert Mcelroy for Dr. Amelie Mendoza MD      Procedures

## 2017-07-19 NOTE — ED TRIAGE NOTES
Pt states he has been having a cough X 2 days, pt has colon cancer and last chemo was 2 weeks ago. Pt denies any SOB.

## 2017-08-07 NOTE — TELEPHONE ENCOUNTER
Patient is currently out of his medication. This patient contacted office for the following prescriptions to be filled:    Medication requested :   Requested Prescriptions     Pending Prescriptions Disp Refills    amLODIPine (NORVASC) 10 mg tablet 30 Tab 2     Sig: Take 1 Tab by mouth daily.      PCP: [address] [place] [address] or Print: [facility]  Mail order or Local pharmacy: [phone]    Scheduled appointment if not seen by current providers in office: LOV: 4/21/17, Next appt: 9/8/17

## 2017-08-10 NOTE — IP AVS SNAPSHOT
[address] [facility] 
 
 
 [address] [facility] Patient: Nitin Tariq MRN: [id number] :[id number] You are allergic to the following No active allergies Recent Documentation Height Weight BMI Smoking Status 1.702 m 73.1 kg 25.26 kg/m2 Former Smoker Unresulted Labs Order Current Status GLUCOSE, FLUID In process PROTEIN TOTAL, FLUID In process Emergency Contacts Name Discharge Info Relation Home Work Mobile Ty Santana DISCHARGE CAREGIVER [3] Life Partner [7] [phone] About your hospitalization You were admitted on:  August 10, 2017 You last received care in the:   [facility] You were discharged on:  August 10, 2017 Unit phone number:  [phone] Why you were hospitalized Your primary diagnosis was:  Not on File Providers Seen During Your Hospitalizations Provider Role Specialty Primary office phone Tami Richard MD Attending Provider Radiology [phone] Your Primary Care Physician (PCP) Primary Care Physician Office Phone Office Fax [facility], [address] [place] [address] [phone] Follow-up Information Follow up With Details Comments Contact Info Kiet Landrum MD  Follow up in one week. Call with any questions or concerns related to today's procedure. [address] [facility] [address] [facility] [address] 
[phone] Amara Mahoney MD   [address] [facility] [address] [place] [address] [address] [facility] [address] 
[phone] Your Appointments 2017  9:45 AM EDT ROUTINE CARE with Amara Mahoney MD  
[address] [place] [address] ([address] [place] [address] Road) [address] [facility] [address] [facility]  
[phone] Current Discharge Medication List  
  
ASK your doctor about these medications Dose & Instructions Dispensing Information Comments Morning Noon Evening Bedtime ACIDOPHILUS PROBIOTIC 100 million cell-10 mg Cap Generic drug:  acidophilus-pectin, citrus Your last dose was: Your next dose is: Take  by mouth. Refills:  0  
     
   
   
   
  
 amLODIPine 10 mg tablet Commonly known as:  Calvinrosie Paganald Your last dose was: Your next dose is:    
   
   
 Dose:  10 mg Take 1 Tab by mouth daily. Quantity:  90 Tab Refills:  0 BD INSULIN PEN NEEDLE UF SHORT 31 gauge x 5/16\" Ndle Generic drug:  Insulin Needles (Disposable) Your last dose was: Your next dose is:    
   
   
 Use 4 times a day Quantity:  1 Package Refills:  0 Blood-Glucose Meter monitoring kit Your last dose was: Your next dose is:    
   
   
 Needs free style glucometer Quantity:  1 Kit Refills:  0  
     
   
   
   
  
 ELIQUIS 5 mg tablet Generic drug:  apixaban Your last dose was: Your next dose is:    
   
   
 Dose:  5 mg Take 5 mg by mouth two (2) times a day. Refills:  0  
     
   
   
   
  
 enalapril 5 mg tablet Commonly known as:  Frutoso Meckel Your last dose was: Your next dose is: Take  by mouth daily. Refills:  0  
     
   
   
   
  
 glucose blood VI test strips strip Commonly known as:  FREESTYLE TEST Your last dose was: Your next dose is:    
   
   
 Check 3 times a day Quantity:  100 Strip Refills:  6  
     
   
   
   
  
 * VICODIN 5-300 mg tablet Generic drug:  HYDROcodone-acetaminophen Your last dose was: Your next dose is:    
   
   
 Dose:  1 Tab Take 1 Tab by mouth every eight (8) hours as needed. Refills:  0  
     
   
   
   
  
 * HYDROcodone-acetaminophen 5-325 mg per tablet Commonly known as:  Linus Pratt Your last dose was: Your next dose is: Take 1-2 tablets PO every 4-6 hours as needed for pain control. If over the counter ibuprofen or acetaminophen was suggested, then only take the vicodin for pain not well controlled with the over the counter medication. Quantity:  12 Tab Refills:  0  
     
   
   
   
  
 insulin glargine 100 unit/mL (3 mL) Inpn Commonly known as:  Kt Lucie Your last dose was: Your next dose is:    
   
   
 20 units at bedtime Quantity:  3 Each Refills:  2 Iron 325 mg (65 mg iron) tablet Generic drug:  ferrous sulfate Your last dose was: Your next dose is:    
   
   
 Dose:  65 mg Take 65 mg by mouth Daily (before breakfast). Refills:  0 Lancets Misc Your last dose was: Your next dose is:    
   
   
 Check twice daily Quantity:  100 Each Refills:  6 MEN'S MULTI-VITAMIN tablet Generic drug:  multivitamin Your last dose was: Your next dose is:    
   
   
 Dose:  1 Tab Take 1 Tab by mouth daily. Refills:  0  
     
   
   
   
  
 metoprolol tartrate 25 mg tablet Commonly known as:  LOPRESSOR Your last dose was: Your next dose is:    
   
   
 Dose:  25 mg Take 25 mg by mouth two (2) times a day. Refills:  0 NovoLOG Flexpen 100 unit/mL Inpn Generic drug:  insulin aspart Your last dose was: Your next dose is: INJECT 3 UNITS BENEATH THE SKIN 3 TIMES DAILY Quantity:  100 mL Refills:  3  
     
   
   
   
  
 nystatin 100,000 unit/mL suspension Commonly known as:  MYCOSTATIN Your last dose was: Your next dose is:    
   
   
 Dose:  1 tsp Take 5 mL by mouth four (4) times daily. swish and spit Quantity:  60 mL Refills:  1  
     
   
   
   
  
 ondansetron hcl 8 mg tablet Commonly known as:  Mercedes Sames Your last dose was: Your next dose is: TAKE 1 TABLET BY MOUTH EVERY 8 HOURS AS NEEDED Refills:  2  
     
   
   
   
  
 * Notice: This list has 2 medication(s) that are the same as other medications prescribed for you. Read the directions carefully, and ask your doctor or other care provider to review them with you. Discharge Instructions [facility] DISCHARGE INSTRUCTIONS General Information: 
During this procedure, the doctor will insert a needle into the abdomen to drain fluid. After the procedure, you will be able to take a deep breath much easier. The site of the puncture may ooze the first day. This will decrease and eventually stop. Paracentesis (draining fluid from the abdomen) sometimes makes patients hypotensive (low blood pressure). Your doctor may order for you to receive fluids or albumin (a volume booster) during the procedure through an IV site. Home Care Instructions: 
Keep the puncture site clean and dry. No tub baths or swimming until puncture site heals. Showering is acceptable. Resume your normal diet, and resume your normal activity slowly and as you tolerate. If you are short of breath, rest. If shortness of breath does not ease, please call your ordering doctor. Fluid can re-accumulate in the chest and/or in the abdomen. If this should occur, your doctor needs to know as you may need to have the procedure done again. Call If: 
   You should call your Physician if you notice any signs of infection, like pus draining, or if it is swollen or reddened. Also call if you have a fever, or if you are bleeding from the puncture site more than a small amount on the dressing. Call if the puncture site keeps draining fluid. Some oozing is to be expected, but should slow and then stop. Call if you feel like you have pressure in your abdomen.  SEEK IMMEDIATE CARE OR CALL 911 IF YOU SUDDENLY HAVE TROUBLE BREATHING, OR IF YOUR LIPS TURN BLUE, OR IF YOU NOTICE BLOOD IN YOUR SPUTUM. Follow-Up Instructions: Please see your ordering doctor as he/she has requested. Percutaneous Liver Biopsy: What to Expect at Home Your Recovery Percutaneous liver biopsy is a procedure to take a tiny sample (biopsy) of your liver tissue. Percutaneous (say \"per-kew-NATE-nee-) means \"through the skin. \" The procedure is also called aspiration biopsy or fine-needle aspiration. The tissue sample is looked at under a microscope. Your doctor can look for infection or other liver problems. You may have some pain where the biopsy needle entered your skin (the puncture site). You may also have pain in your shoulder. This is called referred pain. It is caused by pain traveling along a nerve near the biopsy site. The referred pain usually lasts less than 12 hours. You may have a small amount of bleeding from the puncture site. You will need to take it easy at home for 1 or 2 days after the procedure. You will probably be able to return to work and most of your usual activities after that. This care sheet gives you a general idea about how long it will take for you to recover. But each person recovers at a different pace. Follow the steps below to get better as quickly as possible. How can you care for yourself at home? Activity · Rest when you feel tired. Getting enough sleep will help you recover. · Try to walk each day. Start by walking a little more than you did the day before. Bit by bit, increase the amount you walk. Walking boosts blood flow and helps prevent pneumonia and constipation. · Avoid exercises that use your belly muscles and strenuous activities, such as bicycle riding, jogging, weight lifting, or aerobic exercise, for 1 week or until your doctor says it is okay. · Ask your doctor when you can drive again. · You will probably need to take 1 or 2 days off from work. It depends on the type of work you do and how you feel. · You will probably be able to shower the same day as the test, if your doctor says it is okay. Pat the puncture site dry. Do not take a bath for at least 2 days after the test, or until your doctor tells you it is okay. Diet · You can eat your normal diet. If your stomach is upset, try bland, low-fat foods like plain rice, broiled chicken, toast, and yogurt. · Drink plenty of fluids (unless your doctor tells you not to). Medicines · Your doctor will tell you if and when you can restart your medicines. He or she will also give you instructions about taking any new medicines. · If you take blood thinners, such as warfarin (Coumadin), clopidogrel (Plavix), or aspirin, be sure to talk to your doctor. He or she will tell you if and when to start taking those medicines again. Make sure that you understand exactly what your doctor wants you to do. · Be safe with medicines. Take pain medicines exactly as directed. ¨ If the doctor gave you a prescription medicine for pain, take it as prescribed. ¨ If you are not taking a prescription pain medicine, take an over-the-counter medicine that your doctor recommends. Read and follow all instructions on the label. ¨ Do not take aspirin, ibuprofen (Advil, Motrin), naproxen (Aleve), or other nonsteroidal anti-inflammatory drugs (NSAIDs) unless your doctor says it is okay. · If you think your pain medicine is making you sick to your stomach: 
¨ Take your medicine after meals (unless your doctor has told you not to). ¨ Ask your doctor for a different kind of pain medicine. Care of the puncture site · Keep a bandage over the puncture site for the first 1 or 2 days. Follow-up care is a key part of your treatment and safety. Be sure to make and go to all appointments, and call your doctor if you are having problems. It's also a good idea to know your test results and keep a list of the medicines you take. When should you call for help? Call 911 anytime you think you may need emergency care. For example, call if: 
· You passed out (lost consciousness). · You have severe trouble breathing. · You have sudden chest pain and shortness of breath, or you cough up blood. · You have severe pain in your chest, shoulder, or belly. Call your doctor now or seek immediate medical care if: 
· You have new or worse shortness of breath. · Bright red blood has soaked through the bandage over the puncture site. · You have pain that does not get better after you take your pain medicine. · You are sick to your stomach or cannot keep fluids down. · You have a fever, chills, or body aches. · You have signs of infection, such as: 
¨ Increased pain, swelling, warmth, or redness. ¨ Red streaks leading from the puncture site. ¨ Pus draining from the puncture site. ¨ A fever. · You have new or worse pain at the puncture site. · You have new or worse belly swelling or bloating. · You have trouble passing urine or stool. · Your stools are black and tarlike or have streaks of blood. · You have pale-colored stools along with dark urine and itching. Watch closely for changes in your health, and be sure to contact your doctor if you have any problems. Where can you learn more? Go to http://brad-amina.info/. Enter F869 in the search box to learn more about \"Percutaneous Liver Biopsy: What to Expect at Home. \" Current as of: October 14, 2016 Content Version: 11.3 © 9384-6300 [employer]. Care instructions adapted under license by [employer] (which disclaims liability or warranty for this information). If you have questions about a medical condition or this instruction, always ask your healthcare professional. [place] [address] any warranty or liability for your use of this information. DISCHARGE SUMMARY from Nurse The following personal items are in your possession at time of discharge: Dental Appliances: None Home Medications: None Jewelry: None Clothing: Shirt, Footwear, Undergarments, Socks, Pants Other Valuables: None PATIENT INSTRUCTIONS: 
 
 
F-face looks uneven A-arms unable to move or move unevenly S-speech slurred or non-existent T-time-call 911 as soon as signs and symptoms begin-DO NOT go Back to bed or wait to see if you get better-TIME IS BRAIN. Warning Signs of HEART ATTACK Call 911 if you have these symptoms: 
? Chest discomfort. Most heart attacks involve discomfort in the center of the chest that lasts more than a few minutes, or that goes away and comes back. It can feel like uncomfortable pressure, squeezing, fullness, or pain. ? Discomfort in other areas of the upper body. Symptoms can include pain or discomfort in one or both arms, the back, neck, jaw, or stomach. ? Shortness of breath with or without chest discomfort. ? Other signs may include breaking out in a cold sweat, nausea, or lightheadedness. Don't wait more than five minutes to call [address]! Fast action can save your life. Calling 911 is almost always the fastest way to get lifesaving treatment. Emergency Medical Services staff can begin treatment when they arrive  up to an hour sooner than if someone gets to the hospital by car. The discharge information has been reviewed with the patient and girlfriend, Suzanaustin Patel. The patient and girlfriend verbalized understanding. Discharge medications reviewed with the patient and girlfriend and appropriate educational materials and side effects teaching were provided. Discharge Orders None Introducing [facility]! [facility] introduces DISKOVRe patient portal. Now you can access parts of your medical record, email your doctor's office, and request medication refills online. 1. In your internet browser, go to [email or web address] 2. Click on the First Time User? Click Here link in the Sign In box. You will see the New Member Sign Up page. 3. Enter your [employer] Access Code exactly as it appears below. You will not need to use this code after youve completed the sign-up process. If you do not sign up before the expiration date, you must request a new code. · [employer] Access Code: [email or web address] Expires: 10/1/2017 10:59 AM 
 
4. Enter the last four digits of your Social Security Number (xxxx) and Date of Birth (mm/dd/yyyy) as indicated and click Submit. You will be taken to the next sign-up page. 5. Create a [employer] ID. This will be your [employer] login ID and cannot be changed, so think of one that is secure and easy to remember. 6. Create a [employer] password. You can change your password at any time. 7. Enter your Password Reset Question and Answer. This can be used at a later time if you forget your password. 8. Enter your e-mail address. You will receive e-mail notification when new information is available in [address] 9. Click Sign Up. You can now view and download portions of your medical record. 10. Click the Download Summary menu link to download a portable copy of your medical information. If you have questions, please visit the Frequently Asked Questions section of the [employer] website. Remember, [employer] is NOT to be used for urgent needs. For medical emergencies, dial 911. Now available from your iPhone and Android! General Information Please provide this summary of care documentation to your next provider. Patient Signature:  ____________________________________________________________ Date:  ____________________________________________________________  
  
Aury Ashley Provider Signature:  ____________________________________________________________ Date:  ____________________________________________________________

## 2017-08-10 NOTE — PROGRESS NOTES
TRANSFER - OUT REPORT:    Verbal report given to JEFFREY Graham(name) on Maliha Segura  being transferred to Phase 2(unit) for routine post - op       Report consisted of patients Situation, Background, Assessment and   Recommendations(SBAR). Information from the following report(s) SBAR, Kardex and MAR was reviewed with the receiving nurse. Lines:   Venous Access Device medi-port 07/07/17 Upper chest (subclavicular area, right (Active)       Peripheral IV 08/10/17 Left Antecubital (Active)   Site Assessment Clean, dry, & intact 8/10/2017  8:08 AM   Phlebitis Assessment 0 8/10/2017  8:08 AM   Dressing Status Clean, dry, & intact 8/10/2017  8:08 AM   Dressing Type Tape;Transparent 8/10/2017  8:08 AM   Hub Color/Line Status Pink; Infusing 8/10/2017  8:08 AM        Opportunity for questions and clarification was provided.       Patient transported with:   [employer]

## 2017-08-10 NOTE — PROGRESS NOTES
Preprocedure Assessment      Today 8/10/2017     Indication/Symptoms:   Justina Denver is a 47 y.o. Who has colon cancer and liver lesions. The H & P and/or progress notes and any available imaging were reviewed. The risks, indications and possible alternatives to the procedure, including doing nothing, were discussed and informed consent was obtained. Physical Exam:      Heart:   RRR   Lungs:   Clear, no wheezes, rhonchi or rales. The patient is an appropriate candidate to undergo the planned procedure and sedation.     Richard Graham MD

## 2017-08-10 NOTE — DISCHARGE INSTRUCTIONS
[address] [facility] PARACENTESIS DISCHARGE INSTRUCTIONS    General Information:  During this procedure, the doctor will insert a needle into the abdomen to drain fluid. After the procedure, you will be able to take a deep breath much easier. The site of the puncture may ooze the first day. This will decrease and eventually stop. Paracentesis (draining fluid from the abdomen) sometimes makes patients hypotensive (low blood pressure). Your doctor may order for you to receive fluids or albumin (a volume booster) during the procedure through an IV site. Home Care Instructions:  Keep the puncture site clean and dry. No tub baths or swimming until puncture site heals. Showering is acceptable. Resume your normal diet, and resume your normal activity slowly and as you tolerate. If you are short of breath, rest. If shortness of breath does not ease, please call your ordering doctor. Fluid can re-accumulate in the chest and/or in the abdomen. If this should occur, your doctor needs to know as you may need to have the procedure done again. Call If:     You should call your Physician if you notice any signs of infection, like pus draining, or if it is swollen or reddened. Also call if you have a fever, or if you are bleeding from the puncture site more than a small amount on the dressing. Call if the puncture site keeps draining fluid. Some oozing is to be expected, but should slow and then stop. Call if you feel like you have pressure in your abdomen. SEEK IMMEDIATE CARE OR CALL 911 IF YOU SUDDENLY HAVE TROUBLE BREATHING, OR IF YOUR LIPS TURN BLUE, OR IF YOU NOTICE BLOOD IN YOUR SPUTUM. Follow-Up Instructions: Please see your ordering doctor as he/she has requested. Percutaneous Liver Biopsy: What to Expect at [facility]  Percutaneous liver biopsy is a procedure to take a tiny sample (biopsy) of your liver tissue. Percutaneous (say \"per-mow-NATE-nee-us) means \"through the skin. \" The procedure is also called aspiration biopsy or fine-needle aspiration. The tissue sample is looked at under a microscope. Your doctor can look for infection or other liver problems. You may have some pain where the biopsy needle entered your skin (the puncture site). You may also have pain in your shoulder. This is called referred pain. It is caused by pain traveling along a nerve near the biopsy site. The referred pain usually lasts less than 12 hours. You may have a small amount of bleeding from the puncture site. You will need to take it easy at home for 1 or 2 days after the procedure. You will probably be able to return to work and most of your usual activities after that. This care sheet gives you a general idea about how long it will take for you to recover. But each person recovers at a different pace. Follow the steps below to get better as quickly as possible. How can you care for yourself at home? Activity  · Rest when you feel tired. Getting enough sleep will help you recover. · Try to walk each day. Start by walking a little more than you did the day before. Bit by bit, increase the amount you walk. Walking boosts blood flow and helps prevent pneumonia and constipation. · Avoid exercises that use your belly muscles and strenuous activities, such as bicycle riding, jogging, weight lifting, or aerobic exercise, for 1 week or until your doctor says it is okay. · Ask your doctor when you can drive again. · You will probably need to take 1 or 2 days off from work. It depends on the type of work you do and how you feel. · You will probably be able to shower the same day as the test, if your doctor says it is okay. Pat the puncture site dry. Do not take a bath for at least 2 days after the test, or until your doctor tells you it is okay. Diet  · You can eat your normal diet. If your stomach is upset, try bland, low-fat foods like plain rice, broiled chicken, toast, and yogurt.   · Drink plenty of fluids (unless your doctor tells you not to). Medicines  · Your doctor will tell you if and when you can restart your medicines. He or she will also give you instructions about taking any new medicines. · If you take blood thinners, such as warfarin (Coumadin), clopidogrel (Plavix), or aspirin, be sure to talk to your doctor. He or she will tell you if and when to start taking those medicines again. Make sure that you understand exactly what your doctor wants you to do. · Be safe with medicines. Take pain medicines exactly as directed. ¨ If the doctor gave you a prescription medicine for pain, take it as prescribed. ¨ If you are not taking a prescription pain medicine, take an over-the-counter medicine that your doctor recommends. Read and follow all instructions on the label. ¨ Do not take aspirin, ibuprofen (Advil, Motrin), naproxen (Aleve), or other nonsteroidal anti-inflammatory drugs (NSAIDs) unless your doctor says it is okay. · If you think your pain medicine is making you sick to your stomach:  ¨ Take your medicine after meals (unless your doctor has told you not to). ¨ Ask your doctor for a different kind of pain medicine. Care of the puncture site  · Keep a bandage over the puncture site for the first 1 or 2 days. Follow-up care is a key part of your treatment and safety. Be sure to make and go to all appointments, and call your doctor if you are having problems. It's also a good idea to know your test results and keep a list of the medicines you take. When should you call for help? Call 911 anytime you think you may need emergency care. For example, call if:  · You passed out (lost consciousness). · You have severe trouble breathing. · You have sudden chest pain and shortness of breath, or you cough up blood. · You have severe pain in your chest, shoulder, or belly. Call your doctor now or seek immediate medical care if:  · You have new or worse shortness of breath.   · Bright red blood has soaked through the bandage over the puncture site. · You have pain that does not get better after you take your pain medicine. · You are sick to your stomach or cannot keep fluids down. · You have a fever, chills, or body aches. · You have signs of infection, such as:  ¨ Increased pain, swelling, warmth, or redness. ¨ Red streaks leading from the puncture site. ¨ Pus draining from the puncture site. ¨ A fever. · You have new or worse pain at the puncture site. · You have new or worse belly swelling or bloating. · You have trouble passing urine or stool. · Your stools are black and tarlike or have streaks of blood. · You have pale-colored stools along with dark urine and itching. Watch closely for changes in your health, and be sure to contact your doctor if you have any problems. Where can you learn more? Go to http://brad-amina.info/. Enter Y965 in the search box to learn more about \"Percutaneous Liver Biopsy: What to Expect at Home. \"  Current as of: October 14, 2016  Content Version: 11.3  © 2900-4942 [employer]. Care instructions adapted under license by [employer] (which disclaims liability or warranty for this information). If you have questions about a medical condition or this instruction, always ask your healthcare professional. [place] [address] any warranty or liability for your use of this information.     DISCHARGE SUMMARY from Nurse    The following personal items are in your possession at time of discharge:    Dental Appliances: None        Home Medications: None  Jewelry: None  Clothing: Shirt, Footwear, Undergarments, Socks, Pants  Other Valuables: None     PATIENT INSTRUCTIONS:    After general anesthesia or intravenous sedation, for 24 hours or while taking prescription Narcotics:  · Limit your activities  · Do not drive and operate hazardous machinery  · Do not make important personal or business decisions  · Do  not drink alcoholic beverages  · If you have not urinated within 8 hours after discharge, please contact your surgeon on call. Report the following to your surgeon:  · Excessive pain, swelling, redness or odor of or around the surgical area  · Temperature over 100.5  · Nausea and vomiting lasting longer than 4 hours or if unable to take medications  · Any signs of decreased circulation or nerve impairment to extremity: change in color, persistent  numbness, tingling, coldness or increase pain  · Any questions    *  Please give a list of your current medications to your Primary Care Provider. *  Please update this list whenever your medications are discontinued, doses are      changed, or new medications (including over-the-counter products) are added. *  Please carry medication information at all times in case of emergency situations. These are general instructions for a healthy lifestyle:    No smoking/ No tobacco products/ Avoid exposure to second hand smoke    Surgeon General's Warning:  Quitting smoking now greatly reduces serious risk to your health. Obesity, smoking, and sedentary lifestyle greatly increases your risk for illness    A healthy diet, regular physical exercise & weight monitoring are important for maintaining a healthy lifestyle    You may be retaining fluid if you have a history of heart failure or if you experience any of the following symptoms:  Weight gain of 3 pounds or more overnight or 5 pounds in a week, increased swelling in our hands or feet or shortness of breath while lying flat in bed. Please call your doctor as soon as you notice any of these symptoms; do not wait until your next office visit.     Recognize signs and symptoms of STROKE:    F-face looks uneven    A-arms unable to move or move unevenly    S-speech slurred or non-existent    T-time-call 911 as soon as signs and symptoms begin-DO NOT go       Back to bed or wait to see if you get better-TIME IS BRAIN. Warning Signs of HEART ATTACK     Call 911 if you have these symptoms:   Chest discomfort. Most heart attacks involve discomfort in the center of the chest that lasts more than a few minutes, or that goes away and comes back. It can feel like uncomfortable pressure, squeezing, fullness, or pain.  Discomfort in other areas of the upper body. Symptoms can include pain or discomfort in one or both arms, the back, neck, jaw, or stomach.  Shortness of breath with or without chest discomfort.  Other signs may include breaking out in a cold sweat, nausea, or lightheadedness. Don't wait more than five minutes to call 911 - MINUTES MATTER! Fast action can save your life. Calling 911 is almost always the fastest way to get lifesaving treatment. Emergency Medical Services staff can begin treatment when they arrive -- up to an hour sooner than if someone gets to the hospital by car. The discharge information has been reviewed with the patient and girlfriend, Baljit Traylor. The patient and girlfriend verbalized understanding. Discharge medications reviewed with the patient and girlfriend and appropriate educational materials and side effects teaching were provided.

## 2017-08-18 NOTE — PROCEDURES
INTERVENTIONAL RADIOLOGY PROCEDURE NOTE:    Interventional Radiologist: Jamilah Henry MD    Procedure: US Guided Paracentesis    Pre-operative Diagnosis: Ascites    Post-operative Diagnosis: same    Indication: Ascites    Anesthesia: Local    Procedure Details:  - Informed consent was obtained. - Time Out was performed. - Permanent image storage performed on PACS. - Standard sterile technique. - Image-guided placement of 5 Western Reema Yueh sheathed needle placed into ascites in right lower quadrant.    - Estimated 3.2 L of ascites removed. - Please refer to full report on PACS. Specimen: None requested            Complications:  No immediate    Estimated Blood Loss: Minimal           Impression:  Successful paracentesis.     Jamilah Henry MD  8/18/2017

## 2017-08-18 NOTE — H&P
History and Physical    Patient: Joselin Young MRN: [id number]  SSN: [id number]    YOB: 1965  Age: 47 y.o. Sex: male      Subjective:      Joselin Young is a 47 y.o. male who needs paracentesis. Past Medical History:   Diagnosis Date    Bleeding     [facility] [address]     Diabetes ([facility] [address]     HTN (hypertension)     Liver cancer ([facility] [address]      No past surgical history on file. Family History   Problem Relation Age of Onset    Cancer Father      lung     Social History   Substance Use Topics    Smoking status: Former Smoker     Types: Cigarettes     Quit date: 3/16/2017    Smokeless tobacco: Never Used    Alcohol use No      Prior to Admission medications    Medication Sig Start Date End Date Taking? Authorizing Provider   amLODIPine (NORVASC) 10 mg tablet Take 1 Tab by mouth daily. 8/7/17   Bandar Sorenson MD   BD INSULIN PEN NEEDLE UF SHORT 31 gauge x 5/16\" ndle Use 4 times a day 7/11/17   Opal Ritchie MD   NOVOLOG FLEXPEN 100 unit/mL inpn INJECT 3 UNITS BENEATH THE SKIN 3 TIMES DAILY 6/19/17   Bandar Sorenson MD   ondansetron hcl (ZOFRAN) 8 mg tablet TAKE 1 TABLET BY MOUTH EVERY 8 HOURS AS NEEDED 3/2/17   Historical Provider   nystatin (MYCOSTATIN) 100,000 unit/mL suspension Take 5 mL by mouth four (4) times daily. swish and spit 4/21/17   Bandar Sorenson MD   insulin glargine (LANTUS SOLOSTAR) 100 unit/mL (3 mL) pen 20 units at bedtime 4/21/17   Bandar Sorenson MD   glucose blood VI test strips (FREESTYLE TEST) strip Check 3 times a day 4/21/17   Bandar Sorenson MD   Blood-Glucose Meter monitoring kit Needs free style glucometer 4/21/17   Bandar Sorenson MD   Lancets misc Check twice daily 4/18/17   Bandar Sorenson MD   enalapril (VASOTEC) 5 mg tablet Take  by mouth daily. Historical Provider   metoprolol tartrate (LOPRESSOR) 25 mg tablet Take 25 mg by mouth two (2) times a day.     Historical Provider   HYDROcodone-acetaminophen (VICODIN) 5-300 mg tablet Take 1 Tab by mouth every eight (8) hours as needed. Historical Provider   apixaban (ELIQUIS) 5 mg tablet Take 5 mg by mouth two (2) times a day. Historical Provider   ferrous sulfate (IRON) 325 mg (65 mg iron) tablet Take 65 mg by mouth Daily (before breakfast). Historical Provider   acidophilus-pectin, citrus (ACIDOPHILUS PROBIOTIC) 100 million cell-10 mg cap Take  by mouth. Historical Provider   multivitamin (MEN'S MULTI-VITAMIN) tablet Take 1 Tab by mouth daily. Historical Provider   HYDROcodone-acetaminophen (NORCO) 5-325 mg per tablet Take 1-2 tablets PO every 4-6 hours as needed for pain control. If over the counter ibuprofen or acetaminophen was suggested, then only take the vicodin for pain not well controlled with the over the counter medication. 10/3/16   Rocío Love MD        No Known Allergies    Review of Systems:  A comprehensive review of systems was negative except for that written in the History of Present Illness. Objective: There were no vitals filed for this visit. Physical Exam:  General:  Alert, cooperative, no distress, appears stated age. Eyes:  Conjunctivae/corneas clear. PERRL, EOMs intact. Fundi benign   Ears:  Normal TMs and external ear canals both ears. Nose: Nares normal. Septum midline. Mucosa normal. No drainage or sinus tenderness. Mouth/Throat: Lips, mucosa, and tongue normal. Teeth and gums normal.   Neck: Supple, symmetrical, trachea midline, no adenopathy, thyroid: no enlargment/tenderness/nodules, no carotid bruit and no JVD. Back:   Symmetric, no curvature. ROM normal. No CVA tenderness. Lungs:   Clear to auscultation bilaterally. Heart:  Regular rate and rhythm, S1, S2 normal, no murmur, click, rub or gallop. Abdomen:   Soft, non-tender. Bowel sounds normal. No masses,  No organomegaly. Ascites distension. Extremities: Extremities normal, atraumatic, no cyanosis or edema. Pulses: 2+ and symmetric all extremities.    Skin: Skin color, texture, turgor normal. No rashes or lesions   Lymph nodes: Cervical, supraclavicular, and axillary nodes normal.   Neurologic: CNII-XII intact. Normal strength, sensation and reflexes throughout. Assessment:   Hospital Problems  Date Reviewed: 4/21/2017    None          Plan:     Paracentesis. Signed By: Hernandez Hancock MD     August 18, 2017    History and Physical reviewed; I have examined the patient and there are no pertinent changes.

## 2017-08-21 NOTE — TELEPHONE ENCOUNTER
Octavia Grissom with [facility] is requesting a RX for a glucometer. The one the pt has is broken. Please advise.

## 2017-09-06 NOTE — PROCEDURES
INTERVENTIONAL RADIOLOGY PROCEDURE NOTE:    Procedure: Ultrasound-Guided Paracentesis    : Elton Carreno PA-C    Attending: Lilian Mallory MD    Assistant: None    Procedure:  US-guided paracentesis    Indication: Ascites    Pre-operative Diagnosis: Ascites    Post-operative Diagnosis: Same    Anesthesia: Local    Procedure Details:  - Informed consent was obtained. - Time Out was performed. - Permanent image storage performed on PACS. - Standard sterile technique. - Image-guided placement of 5 Western Reema Yueh sheathed needle placed into ascites in right lower quadrant.    - Estimated 5 L of ascites removed. - Please refer to full report on PACS. Specimen: None          Complications:  No immediate    Estimated Blood Loss: Minimal           Impression:  Successful paracentesis.     James Oneal MD  9/6/2017

## 2017-09-06 NOTE — H&P
Interventional Radiology History and Physical    Patient: Nava Marte MRN: [id number]  SSN: [id number]    YOB: 1965  Age: 47 y.o. Sex: male      Subjective:      Nava Marte is a 47 y.o. male who is scheduled for paracentesis. Past Medical History:   Diagnosis Date    Bleeding     [facility] ([facility] [address]     Diabetes ([facility] [address]     HTN (hypertension)     Liver cancer ([facility] [address]      No past surgical history on file. Family History   Problem Relation Age of Onset    Cancer Father      lung     Social History   Substance Use Topics    Smoking status: Former Smoker     Types: Cigarettes     Quit date: 3/16/2017    Smokeless tobacco: Never Used    Alcohol use No      Prior to Admission medications    Medication Sig Start Date End Date Taking? Authorizing Provider   Blood-Glucose Meter monitoring kit Needs free style glucometer 8/22/17   Tino Valladares MD   amLODIPine (NORVASC) 10 mg tablet Take 1 Tab by mouth daily. 8/7/17   Tino Valladares MD   BD INSULIN PEN NEEDLE UF SHORT 31 gauge x 5/16\" ndle Use 4 times a day 7/11/17   Laquita Pritchett MD   NOVOLOG FLEXPEN 100 unit/mL inpn INJECT 3 UNITS BENEATH THE SKIN 3 TIMES DAILY 6/19/17   Tino Valladares MD   ondansetron hcl (ZOFRAN) 8 mg tablet TAKE 1 TABLET BY MOUTH EVERY 8 HOURS AS NEEDED 3/2/17   Historical Provider   nystatin (MYCOSTATIN) 100,000 unit/mL suspension Take 5 mL by mouth four (4) times daily. swish and spit 4/21/17   Tino Valladares MD   insulin glargine (LANTUS SOLOSTAR) 100 unit/mL (3 mL) pen 20 units at bedtime 4/21/17   Tino Valladares MD   glucose blood VI test strips (FREESTYLE TEST) strip Check 3 times a day 4/21/17   Tino Valladares MD   Lancets misc Check twice daily 4/18/17   Tino Valladares MD   enalapril (VASOTEC) 5 mg tablet Take  by mouth daily. Historical Provider   metoprolol tartrate (LOPRESSOR) 25 mg tablet Take 25 mg by mouth two (2) times a day.     Historical Provider   HYDROcodone-acetaminophen (VICODIN) 5-300 mg tablet Take 1 Tab by mouth every eight (8) hours as needed. Historical Provider   apixaban (ELIQUIS) 5 mg tablet Take 5 mg by mouth two (2) times a day. Historical Provider   ferrous sulfate (IRON) 325 mg (65 mg iron) tablet Take 65 mg by mouth Daily (before breakfast). Historical Provider   acidophilus-pectin, citrus (ACIDOPHILUS PROBIOTIC) 100 million cell-10 mg cap Take  by mouth. Historical Provider   multivitamin (MEN'S MULTI-VITAMIN) tablet Take 1 Tab by mouth daily. Historical Provider   HYDROcodone-acetaminophen (NORCO) 5-325 mg per tablet Take 1-2 tablets PO every 4-6 hours as needed for pain control. If over the counter ibuprofen or acetaminophen was suggested, then only take the vicodin for pain not well controlled with the over the counter medication. 10/3/16   Marcni Ruiz MD        No Known Allergies    Review of Systems:  Pertinent items are noted in the History of Present Illness. Objective: There were no vitals filed for this visit. Physical Exam:  GENERAL: alert, cooperative, no distress, appears stated age    Assessment:     Hospital Problems  Date Reviewed: 4/21/2017    None          Plan:     Paracentesis. The patient has an INR of 1.4 however the eliquis has been held for 72 hours (patient reports not taking eliquis on Sunday, Monday, or Tuesday). Patient has been instructed to restart Eliquis no sooner than 9/7/2017 Thursday. Signed By: Rayray Herrera MD     September 6, 2017      History and Physical reviewed; I have examined the patient and there are no pertinent changes.

## 2017-09-08 NOTE — PROGRESS NOTES
HISTORY OF PRESENT ILLNESS  Michael Sy is a 47 y.o. male. HPI: multiple medical problems. Here to discussed lab result and routine follow up. Recent diagnosis of colon cancer and metastasis to liver. Has been going through chemo and radiation through oncology. Lately feeling ascitics, leg swelling. On 9/6/17 had paracentesis done and feeling some improvement in abdominal discomfort and sob. Said his abdominal pain is fairly controlled on current medication. Denies any nausea or vomiting , no bowel control. Limit in taking oral fluid. [place] he is feeling he has been feeling that he is not going for urination enough. Said he feels some time frequency and sometime not passing urine at all. No dysuria. No change in urine stream. No blood in urine. UA done at the office negative for blood or infection. Using Ambien lately due to some sleep discomfort. No mood changes. Denies feeling depression. Noted elevated HBA1C to 12 units. Per him his blood sugar meter is not working. Given new meter but still shows error. Discussed with him to bring meter to pharmacy or bring it back to office and  Will help to finger it out how to use it or any error with machine. He understood that and will do it.    Review labs with him/  Lab Results   Component Value Date/Time    WBC 35.1 09/06/2017 10:42 AM    HGB 10.8 09/06/2017 10:42 AM    HCT 32.4 09/06/2017 10:42 AM    PLATELET 685 29/24/2546 10:42 AM    MCV 91.3 09/06/2017 10:42 AM     Lab Results   Component Value Date/Time    Sodium 140 08/10/2017 08:00 AM    Potassium 4.2 08/10/2017 08:00 AM    Chloride 106 08/10/2017 08:00 AM    CO2 26 08/10/2017 08:00 AM    Anion gap 8 08/10/2017 08:00 AM    Glucose 47 08/10/2017 08:00 AM    BUN 7 08/10/2017 08:00 AM    Creatinine 0.66 08/10/2017 08:00 AM    BUN/Creatinine ratio 11 08/10/2017 08:00 AM    GFR est AA >60 08/10/2017 08:00 AM    GFR est non-AA >60 08/10/2017 08:00 AM    Calcium 9.3 08/10/2017 08:00 AM    Bilirubin, total 0.8 07/19/2017 12:25 AM    AST (SGOT) 44 07/19/2017 12:25 AM    Alk. phosphatase 372 07/19/2017 12:25 AM    Protein, total 7.3 07/19/2017 12:25 AM    Albumin 2.3 07/19/2017 12:25 AM    Globulin 5.0 07/19/2017 12:25 AM    A-G Ratio 0.5 07/19/2017 12:25 AM    ALT (SGPT) 22 07/19/2017 12:25 AM     Lab Results   Component Value Date/Time    TSH 1.71 04/18/2017 11:58 AM     Lab Results   Component Value Date/Time    Cholesterol, total 150 04/18/2017 11:58 AM    HDL Cholesterol 41 04/18/2017 11:58 AM    LDL, calculated 91.8 04/18/2017 11:58 AM    VLDL, calculated 17.2 04/18/2017 11:58 AM    Triglyceride 86 04/18/2017 11:58 AM    CHOL/HDL Ratio 3.7 04/18/2017 11:58 AM     Lab Results   Component Value Date/Time    Microalbumin/Creat ratio (mg/g creat) 11 04/18/2017 11:58 AM    Microalbumin,urine random 4.28 04/18/2017 11:58 AM     Lab Results   Component Value Date/Time    Hemoglobin A1c 12.1 04/18/2017 11:58 AM       ROS: see HPI     Physical Exam   Constitutional: He is oriented to person, place, and time. No distress. Cardiovascular: Normal heart sounds. Pulmonary/Chest: He has no wheezes. Abdominal: There is tenderness (generalize discomfort. no point tenderness ). There is no rebound and no guarding. ascitics present    Musculoskeletal: He exhibits edema. Neurological: He is oriented to person, place, and time. Psychiatric: His behavior is normal.       ASSESSMENT and PLAN    ICD-10-CM ICD-9-CM    1. Poorly controlled diabetes mellitus ([address]): for now increase lantus to 24 units from 20 units. Also meal time insulin went up to 8 units from 6 units. Discuss about glucometer , once it gets fix advise to keep blood sugar log and bring it back. Discussed hypoglycemia management. Also labs to be repeat . Diet modification. Provided him early appt with Dr. Janes Barrett office to get seen and I believ was on 9/12/17. Close follow up.   E11.65 250.00 insulin glargine (LANTUS,BASAGLAR) 100 unit/mL (3 mL) inpn insulin aspart (NOVOLOG FLEXPEN) 100 unit/mL inpn      BD INSULIN PEN NEEDLE UF SHORT 31 gauge x 5/16\" ndle      METABOLIC PANEL, COMPREHENSIVE      REFERRAL TO DIETITIAN   2. Urinary frequency: UA negative for blood or infection. Restricted po fluid intake due to ascitics and leg swelling. I do not see any fluid pill will consider adding furosemide. F/u in couple of weeks. R35.0 788.41    3. Sleep trouble: ambien as needed. Managed by oncologist.  G47.9 780.50    4. Malignant neoplasm of colon, unspecified part of colon (HCC)/ with liver metastasis : following oncologist.  C18.9 153.9    5. Other ascites: due to complication of liver metastasis. Symptomatic management. Recently had paracentesis done last week. Adding furosemide. R18.8 789.59 AMB POC URINALYSIS DIP STICK AUTO W/ MICRO      furosemide (LASIX) 20 mg tablet      METABOLIC PANEL, COMPREHENSIVE      REFERRAL TO DIETITIAN   6. Leg swelling; adding symptomatic treatment. Low salt diet. M79.89 729.81 furosemide (LASIX) 20 mg tablet      METABOLIC PANEL, COMPREHENSIVE   Pt understood and agrees with above plan. Also noted elevated wbc. Done couple of days ago. No signs of infection. Will repeat labs. Follow-up Disposition:  Return in about 3 weeks (around 9/29/2017).

## 2017-09-08 NOTE — MR AVS SNAPSHOT
Visit Information Date & Time Provider Department Dept. Phone Encounter #  
 9/8/2017  9:45 AM Mario Patterson, [address] [id number] Follow-up Instructions Return in about 3 weeks (around 9/29/2017). Upcoming Health Maintenance Date Due  
 FOOT EXAM Q1 3/14/1976 EYE EXAM RETINAL OR DILATED Q1 3/14/1976 Pneumococcal 19-64 Highest Risk (1 of 3 - PCV13) 3/14/1985 INFLUENZA AGE 9 TO ADULT 8/1/2017 HEMOGLOBIN A1C Q6M 10/18/2017 MICROALBUMIN Q1 4/18/2018 LIPID PANEL Q1 4/18/2018 COLONOSCOPY 4/4/2019 DTaP/Tdap/Td series (2 - Td) 3/28/2027 Allergies as of 9/8/2017  Review Complete On: 9/8/2017 By: Yisel Moulton No Known Allergies Current Immunizations  Reviewed on 7/7/2017 Name Date Tdap 3/28/2017 Not reviewed this visit You Were Diagnosed With   
  
 Codes Comments Poorly controlled diabetes mellitus ([facility]    -  Primary ICD-10-CM: E11.65 ICD-9-CM: 250.00 Urinary frequency     ICD-10-CM: R35.0 ICD-9-CM: 788.41 Sleep trouble     ICD-10-CM: G47.9 ICD-9-CM: 780.50 Malignant neoplasm of colon, unspecified part of colon ([facility]     ICD-10-CM: C18.9 ICD-9-CM: 153.9 Other ascites     ICD-10-CM: R18.8 ICD-9-CM: 789.59 Leg swelling     ICD-10-CM: M79.89 ICD-9-CM: 729.81 Vitals BP Temp Resp Height(growth percentile) Weight(growth percentile) BMI  
 90/70 (BP 1 Location: Left arm, BP Patient Position: Sitting) 98 °F (36.7 °C) (Oral) 16 5' 7\" (1.702 m) 162 lb 9.6 oz (73.8 kg) 25.47 kg/m2 Smoking Status Former Smoker Vitals History BMI and BSA Data Body Mass Index Body Surface Area  
 25.47 kg/m 2 1.87 m 2 Preferred Pharmacy Pharmacy Name Phone [id number] Gonzalo Vital, [address] [facility] [phone] Your Updated Medication List  
  
   
 This list is accurate as of: 17 11:26 AM.  Always use your most recent med list.  
  
  
  
  
 ACIDOPHILUS PROBIOTIC 100 million cell-10 mg Cap Generic drug:  acidophilus-pectin, citrus Take  by mouth. amLODIPine 10 mg tablet Commonly known as:  [place] Bars Take 1 Tab by mouth daily. BD INSULIN PEN NEEDLE UF SHORT 31 gauge x 5/16\" Ndle Generic drug:  Insulin Needles (Disposable) Use 4 times a day Blood-Glucose Meter monitoring kit Needs free style glucometer ELIQUIS 5 mg tablet Generic drug:  apixaban Take 5 mg by mouth two (2) times a day. enalapril 5 mg tablet Commonly known as:  Kyle Harvinder Take  by mouth daily. furosemide 20 mg tablet Commonly known as:  LASIX  
1 pill daily in am as needed. glucose blood VI test strips strip Commonly known as:  FREESTYLE TEST Check 3 times a day  
  
 insulin aspart 100 unit/mL Inpn Commonly known as:  [place] Car Use 8 units each meal  
  
 insulin glargine 100 unit/mL (3 mL) Inpn Commonly known as:  LANTUS,BASAGLAR  
24 units at bedtime Iron 325 mg (65 mg iron) tablet Generic drug:  ferrous sulfate Take 65 mg by mouth Daily (before breakfast). Lancets Misc Check twice daily MEN'S MULTI-VITAMIN tablet Generic drug:  multivitamin Take 1 Tab by mouth daily. metoprolol tartrate 25 mg tablet Commonly known as:  LOPRESSOR Take 25 mg by mouth two (2) times a day. VENTOLIN HFA 90 mcg/actuation inhaler Generic drug:  albuterol  
inhale 2 puffs by mouth every 6 hours if needed VICODIN 5-300 mg tablet Generic drug:  HYDROcodone-acetaminophen Take 1 Tab by mouth every eight (8) hours as needed. zolpidem 5 mg tablet Commonly known as:  AMBIEN Take 1 Tab by mouth nightly as needed. Prescriptions Sent to Pharmacy Refills  
 insulin glargine (LANTUS,BASAGLAR) 100 unit/mL (3 mL) inpn 3 Si units at bedtime  Class: Normal  
 [facility] [address] Ph #: [phone]  
 insulin aspart (NOVOLOG FLEXPEN) 100 unit/mL inpn 3 Sig: Use 8 units each meal  
 Class: Normal  
 Pharmacy: [facility] Ph #: [phone] BD INSULIN PEN NEEDLE UF SHORT 31 gauge x 5/16\" ndle 0 Sig: Use 4 times a day Class: Normal  
 Pharmacy: [id number] [address] Ph #: [phone]  
 furosemide (LASIX) 20 mg tablet 1 Si pill daily in am as needed. Class: Normal  
 Pharmacy: [id number] [address] Ph #: [phone] We Performed the Following AMB POC URINALYSIS DIP STICK AUTO W/ MICRO [05475 CPT(R)] REFERRAL TO DIETITIAN [PDM53 Custom] Comments:  
 Please evaluate patient for colon cancer, ascitis. Diabetes, Follow-up Instructions Return in about 3 weeks (around 2017). To-Do List   
 2017 Lab:  METABOLIC PANEL, COMPREHENSIVE Referral Information Referral ID Referred By Referred To  
  
 [id number] [facility]. [address], [address] [place], [address] Phone: [phone] Visits Status Start Date End Date 1 New Request 17 If your referral has a status of pending review or denied, additional information will be sent to support the outcome of this decision. Patient Instructions Ascites: Care Instructions Your Care Instructions Ascites (say \"uh-SY-teez\") is a buildup of extra fluid in the belly. It can cause your belly to swell. It can also make it hard for you to breathe. Many diseases can cause ascites. But most people who get it have a liver problem. When the liver gets damaged, it can cause fluid to back up from the liver or from blood vessels. Then this fluid builds up in the belly. Your doctor may take a sample of the fluid in your belly with a thin needle. The sample is then tested to help find out the cause of the ascites. Treatment may include medicine. It may also include changing the way you eat so you don't eat a lot of salt. If your ascites is very bad and hard to treat, your doctor may need to use a needle to take out the fluid. Follow-up care is a key part of your treatment and safety. Be sure to make and go to all appointments, and call your doctor if you are having problems. It's also a good idea to know your test results and keep a list of the medicines you take. How can you care for yourself at home? · Be safe with medicines. Take your medicines exactly as prescribed. Call your doctor if you have any problems with your medicine. You will get more details on the specific medicines your doctor prescribes. · Eat low-salt foods, and don't add salt to your food. If you eat a lot of salt, it's harder to get rid of the extra fluid. Salt is in many prepared foods. These include chan, canned foods, snack foods, sauces, and soups. Look for products that are low-sodium or have reduced salt. · Do not drink any alcohol until you are all better. Alcohol will damage the liver more. If your liver disease is caused by drinking alcohol, do not drink alcohol at all. Tell your doctor if you need help to quit. Counseling, support groups, and sometimes medicines can help you stay sober. · Talk to your doctor before you take any other medicines. These include over-the-counter medicines, vitamins, and herbal products. · Make sure your doctor knows all the medicines you take. Some medicines, such as acetaminophen (Tylenol), can make liver problems worse. When should you call for help? Call 911 anytime you think you may need emergency care. For example, call if: 
· You have severe trouble breathing. Call your doctor now or seek immediate medical care if: 
· You have new belly pain or swelling. · You have a fever not caused by the flu or some other illness that you know you have. · The swelling in your belly gets bigger, or you get a bulge near your belly button. Watch closely for changes in your health, and be sure to contact your doctor if: 
· You would like help to plan a diet that protects your liver. Where can you learn more? Go to http://brad-amina.info/. Enter B970 in the search box to learn more about \"Ascites: Care Instructions. \" Current as of: August 9, 2016 Content Version: 11.3 © 5205-0265 [employer]. Care instructions adapted under license by [employer] (which disclaims liability or warranty for this information). If you have questions about a medical condition or this instruction, always ask your healthcare professional. [address] any warranty or liability for your use of this information. Colon Cancer: Care Instructions Your Care Instructions Colon cancer occurs when abnormal cells grow out of control in the lower part of your intestine (your colon). If the tumor was small and had not spread, your doctor may have removed it during the colonoscopy. But you may need surgery to remove the cancer if the tumor was too big or had spread too far to be removed during a colonoscopy. If cancer has spread to another part of your body, such as the liver, you may need more far-reaching surgery. Treatment for colon cancer may also include radiation therapy and medicines that destroy cancer cells (chemotherapy). Being treated for cancer can weaken your body, and you may feel very tired. Get the rest your body needs so that you can feel better. When you find out that you have cancer, you may feel many emotions and may need some help coping. Seek out family, friends, and counselors for support. You also can do things at home to make yourself feel better while you go through treatment.  Call the [address] ([phone]) or visit its website at [address]. [employer] for more information. Follow-up care is a key part of your treatment and safety. Be sure to make and go to all appointments, and call your doctor if you are having problems. It's also a good idea to know your test results and keep a list of the medicines you take. How can you care for yourself at home? · Take your medicines exactly as prescribed. Call your doctor if you think you are having a problem with your medicine. You may get medicine for nausea and vomiting if you have these side effects. · Follow your doctor's instructions to relieve pain. Pain from cancer and surgery can almost always be controlled. Use pain medicine when you first notice pain, before it becomes severe. · Eat healthy food. If you do not feel like eating, try to eat food that has protein and extra calories to keep up your strength and prevent weight loss. Drink liquid meal replacements for extra calories and protein. Try to eat your main meal early. · Get some physical activity every day, but do not get too tired. Keep doing the hobbies you enjoy as your energy allows. · Take steps to control your stress and workload. Learn relaxation techniques. ¨ Share your feelings. Stress and tension affect our emotions. By expressing your feelings to others, you may be able to understand and cope with them. ¨ Consider joining a support group. Talking about a problem with your spouse, a good friend, or other people with similar problems is a good way to reduce tension and stress. ¨ Express yourself through art. Try writing, dance, art, or crafts to relieve stress. Some dance, writing, or art groups may be available just for people who have cancer. ¨ Be kind to your body and mind. Getting enough sleep, eating a healthy diet, and taking time to do things you enjoy can contribute to an overall feeling of balance in your life and help reduce stress. ¨ Get help if you need it. Discuss your concerns with your doctor or counselor. · If you are vomiting or have diarrhea: ¨ Drink plenty of fluids (enough so that your urine is light yellow or clear like water) to prevent dehydration. Choose water and other caffeine-free clear liquids. If you have kidney, heart, or liver disease and have to limit fluids, talk with your doctor before you increase the amount of fluids you drink. ¨ When you are able to eat, try clear soups, mild foods, and liquids until all symptoms are gone for 12 to 48 hours. Other good choices include dry toast, crackers, cooked cereal, and gelatin dessert, such as Jell-O. · If you have not already done so, prepare a list of advance directives. Advance directives are instructions to your doctor and family members about what kind of care you want if you become unable to speak or express yourself. When should you call for help? Call 911 anytime you think you may need emergency care. For example, call if: 
· You pass maroon or very bloody stools. · You vomit blood or what looks like coffee grounds. · You have sudden chest pain and shortness of breath, or you cough up blood. · You have severe belly pain. Call your doctor now or seek immediate medical care if: 
· You have signs of a blood clot, such as: 
¨ Pain in your calf, back of the knee, thigh, or groin. ¨ Redness and swelling in your leg or groin. · You have a fever. · You have nausea or vomiting. · You are very constipated or have diarrhea for several days. · Your stools are black and tarlike or have streaks of blood. Watch closely for changes in your health, and be sure to contact your doctor if: 
· Your pain is not controlled by medicine. · Your symptoms get worse or are not improving as expected. Where can you learn more? Go to http://brad-amina.info/. Enter P777 in the search box to learn more about \"Colon Cancer: Care Instructions. \" 
 Current as of: July 26, 2016 Content Version: 11.3 © 0540-4751 [employer]. Care instructions adapted under license by [employer] (which disclaims liability or warranty for this information). If you have questions about a medical condition or this instruction, always ask your healthcare professional. [address] any warranty or liability for your use of this information. Learning About Diabetes Food Guidelines Your Care Instructions Meal planning is important to manage diabetes. It helps keep your blood sugar at a target level (which you set with your doctor). You don't have to eat special foods. You can eat what your family eats, including sweets once in a while. But you do have to pay attention to how often you eat and how much you eat of certain foods. You may want to work with a dietitian or a certified diabetes educator (CDE) to help you plan meals and snacks. A dietitian or CDE can also help you lose weight if that is one of your goals. What should you know about eating carbs? Managing the amount of carbohydrate (carbs) you eat is an important part of healthy meals when you have diabetes. Carbohydrate is found in many foods. · Learn which foods have carbs. And learn the amounts of carbs in different foods. ¨ Bread, cereal, pasta, and rice have about 15 grams of carbs in a serving. A serving is 1 slice of bread (1 ounce), ½ cup of cooked cereal, or 1/3 cup of cooked pasta or rice. ¨ Fruits have 15 grams of carbs in a serving. A serving is 1 small fresh fruit, such as an apple or orange; ½ of a banana; ½ cup of cooked or canned fruit; ½ cup of fruit juice; 1 cup of melon or raspberries; or 2 tablespoons of dried fruit. ¨ Milk and no-sugar-added yogurt have 15 grams of carbs in a serving. A serving is 1 cup of milk or 2/3 cup of no-sugar-added yogurt. ¨ Starchy vegetables have 15 grams of carbs in a serving.  A serving is ½ cup of mashed potatoes or sweet potato; 1 cup winter squash; ½ of a small baked potato; ½ cup of cooked beans; or ½ cup cooked corn or green peas. · Learn how much carbs to eat each day and at each meal. A dietitian or CDE can teach you how to keep track of the amount of carbs you eat. This is called carbohydrate counting. · If you are not sure how to count carbohydrate grams, use the Plate Method to plan meals. It is a good, quick way to make sure that you have a balanced meal. It also helps you spread carbs throughout the day. ¨ Divide your plate by types of foods. Put non-starchy vegetables on half the plate, meat or other protein food on one-quarter of the plate, and a grain or starchy vegetable in the final quarter of the plate. To this you can add a small piece of fruit and 1 cup of milk or yogurt, depending on how many carbs you are supposed to eat at a meal. 
· Try to eat about the same amount of carbs at each meal. Do not \"save up\" your daily allowance of carbs to eat at one meal. 
· Proteins have very little or no carbs per serving. Examples of proteins are beef, chicken, turkey, fish, eggs, tofu, cheese, cottage cheese, and peanut butter. A serving size of meat is 3 ounces, which is about the size of a deck of cards. Examples of meat substitute serving sizes (equal to 1 ounce of meat) are 1/4 cup of cottage cheese, 1 egg, 1 tablespoon of peanut butter, and ½ cup of tofu. How can you eat out and still eat healthy? · Learn to estimate the serving sizes of foods that have carbohydrate. If you measure food at home, it will be easier to estimate the amount in a serving of restaurant food. · If the meal you order has too much carbohydrate (such as potatoes, corn, or baked beans), ask to have a low-carbohydrate food instead. Ask for a salad or green vegetables. · If you use insulin, check your blood sugar before and after eating out to help you plan how much to eat in the future. · If you eat more carbohydrate at a meal than you had planned, take a walk or do other exercise. This will help lower your blood sugar. What else should you know? · Limit saturated fat, such as the fat from meat and dairy products. This is a healthy choice because people who have diabetes are at higher risk of heart disease. So choose lean cuts of meat and nonfat or low-fat dairy products. Use olive or canola oil instead of butter or shortening when cooking. · Don't skip meals. Your blood sugar may drop too low if you skip meals and take insulin or certain medicines for diabetes. · Check with your doctor before you drink alcohol. Alcohol can cause your blood sugar to drop too low. Alcohol can also cause a bad reaction if you take certain diabetes medicines. Follow-up care is a key part of your treatment and safety. Be sure to make and go to all appointments, and call your doctor if you are having problems. It's also a good idea to know your test results and keep a list of the medicines you take. Where can you learn more? Go to http://brad-amina.info/. Enter G320 in the search box to learn more about \"Learning About Diabetes Food Guidelines. \" Current as of: March 13, 2017 Content Version: 11.3 © 0861-4903 [employer]. Care instructions adapted under license by [employer] (which disclaims liability or warranty for this information). If you have questions about a medical condition or this instruction, always ask your healthcare professional. [address] any warranty or liability for your use of this information. Please provide this summary of care documentation to your next provider. Your primary care clinician is listed as Dylan Edgar. If you have any questions after today's visit, please call [phone].

## 2017-09-08 NOTE — PATIENT INSTRUCTIONS
Ascites: Care Instructions  Your Care Instructions  Ascites (say \"uh-SY-teez\") is a buildup of extra fluid in the belly. It can cause your belly to swell. It can also make it hard for you to breathe. Many diseases can cause ascites. But most people who get it have a liver problem. When the liver gets damaged, it can cause fluid to back up from the liver or from blood vessels. Then this fluid builds up in the belly. Your doctor may take a sample of the fluid in your belly with a thin needle. The sample is then tested to help find out the cause of the ascites. Treatment may include medicine. It may also include changing the way you eat so you don't eat a lot of salt. If your ascites is very bad and hard to treat, your doctor may need to use a needle to take out the fluid. Follow-up care is a key part of your treatment and safety. Be sure to make and go to all appointments, and call your doctor if you are having problems. It's also a good idea to know your test results and keep a list of the medicines you take. How can you care for yourself at home? · Be safe with medicines. Take your medicines exactly as prescribed. Call your doctor if you have any problems with your medicine. You will get more details on the specific medicines your doctor prescribes. · Eat low-salt foods, and don't add salt to your food. If you eat a lot of salt, it's harder to get rid of the extra fluid. Salt is in many prepared foods. These include chan, canned foods, snack foods, sauces, and soups. Look for products that are low-sodium or have reduced salt. · Do not drink any alcohol until you are all better. Alcohol will damage the liver more. If your liver disease is caused by drinking alcohol, do not drink alcohol at all. Tell your doctor if you need help to quit. Counseling, support groups, and sometimes medicines can help you stay sober. · Talk to your doctor before you take any other medicines.  These include over-the-counter medicines, vitamins, and herbal products. · Make sure your doctor knows all the medicines you take. Some medicines, such as acetaminophen (Tylenol), can make liver problems worse. When should you call for help? Call 911 anytime you think you may need emergency care. For example, call if:  · You have severe trouble breathing. Call your doctor now or seek immediate medical care if:  · You have new belly pain or swelling. · You have a fever not caused by the flu or some other illness that you know you have. · The swelling in your belly gets bigger, or you get a bulge near your belly button. Watch closely for changes in your health, and be sure to contact your doctor if:  · You would like help to plan a diet that protects your liver. Where can you learn more? Go to http://brad-amina.info/. Enter B970 in the search box to learn more about \"Ascites: Care Instructions. \"  Current as of: August 9, 2016  Content Version: 11.3  © 9450-7616 [employer]. Care instructions adapted under license by [employer] (which disclaims liability or warranty for this information). If you have questions about a medical condition or this instruction, always ask your healthcare professional. [address] any warranty or liability for your use of this information. Colon Cancer: Care Instructions  Your Care Instructions    Colon cancer occurs when abnormal cells grow out of control in the lower part of your intestine (your colon). If the tumor was small and had not spread, your doctor may have removed it during the colonoscopy. But you may need surgery to remove the cancer if the tumor was too big or had spread too far to be removed during a colonoscopy. If cancer has spread to another part of your body, such as the liver, you may need more far-reaching surgery.   Treatment for colon cancer may also include radiation therapy and medicines that destroy cancer cells (chemotherapy). Being treated for cancer can weaken your body, and you may feel very tired. Get the rest your body needs so that you can feel better. When you find out that you have cancer, you may feel many emotions and may need some help coping. Seek out family, friends, and counselors for support. You also can do things at home to make yourself feel better while you go through treatment. Call the [employer] (9-204.235.3572) or visit its website at [address] for more information. Follow-up care is a key part of your treatment and safety. Be sure to make and go to all appointments, and call your doctor if you are having problems. It's also a good idea to know your test results and keep a list of the medicines you take. How can you care for yourself at home? · Take your medicines exactly as prescribed. Call your doctor if you think you are having a problem with your medicine. You may get medicine for nausea and vomiting if you have these side effects. · Follow your doctor's instructions to relieve pain. Pain from cancer and surgery can almost always be controlled. Use pain medicine when you first notice pain, before it becomes severe. · Eat healthy food. If you do not feel like eating, try to eat food that has protein and extra calories to keep up your strength and prevent weight loss. Drink liquid meal replacements for extra calories and protein. Try to eat your main meal early. · Get some physical activity every day, but do not get too tired. Keep doing the hobbies you enjoy as your energy allows. · Take steps to control your stress and workload. Learn relaxation techniques. ¨ Share your feelings. Stress and tension affect our emotions. By expressing your feelings to others, you may be able to understand and cope with them. ¨ Consider joining a support group.  Talking about a problem with your spouse, a good friend, or other people with similar problems is a good way to reduce tension and stress. ¨ Express yourself through art. Try writing, dance, art, or crafts to relieve stress. Some dance, writing, or art groups may be available just for people who have cancer. ¨ Be kind to your body and mind. Getting enough sleep, eating a healthy diet, and taking time to do things you enjoy can contribute to an overall feeling of balance in your life and help reduce stress. ¨ Get help if you need it. Discuss your concerns with your doctor or counselor. · If you are vomiting or have diarrhea:  ¨ Drink plenty of fluids (enough so that your urine is light yellow or clear like water) to prevent dehydration. Choose water and other caffeine-free clear liquids. If you have kidney, heart, or liver disease and have to limit fluids, talk with your doctor before you increase the amount of fluids you drink. ¨ When you are able to eat, try clear soups, mild foods, and liquids until all symptoms are gone for 12 to 48 hours. Other good choices include dry toast, crackers, cooked cereal, and gelatin dessert, such as Jell-O.  · If you have not already done so, prepare a list of advance directives. Advance directives are instructions to your doctor and family members about what kind of care you want if you become unable to speak or express yourself. When should you call for help? Call 911 anytime you think you may need emergency care. For example, call if:  · You pass maroon or very bloody stools. · You vomit blood or what looks like coffee grounds. · You have sudden chest pain and shortness of breath, or you cough up blood. · You have severe belly pain. Call your doctor now or seek immediate medical care if:  · You have signs of a blood clot, such as:  ¨ Pain in your calf, back of the knee, thigh, or groin. ¨ Redness and swelling in your leg or groin. · You have a fever. · You have nausea or vomiting. · You are very constipated or have diarrhea for several days.   · Your stools are black and tarlike or have streaks of blood. Watch closely for changes in your health, and be sure to contact your doctor if:  · Your pain is not controlled by medicine. · Your symptoms get worse or are not improving as expected. Where can you learn more? Go to http://brad-amina.info/. Enter P747 in the search box to learn more about \"Colon Cancer: Care Instructions. \"  Current as of: July 26, 2016  Content Version: 11.3  © 8667-6901 [employer]. Care instructions adapted under license by [employer] (which disclaims liability or warranty for this information). If you have questions about a medical condition or this instruction, always ask your healthcare professional. [address] any warranty or liability for your use of this information. Learning About Diabetes Food Guidelines  Your Care Instructions  Meal planning is important to manage diabetes. It helps keep your blood sugar at a target level (which you set with your doctor). You don't have to eat special foods. You can eat what your family eats, including sweets once in a while. But you do have to pay attention to how often you eat and how much you eat of certain foods. You may want to work with a dietitian or a certified diabetes educator (CDE) to help you plan meals and snacks. A dietitian or CDE can also help you lose weight if that is one of your goals. What should you know about eating carbs? Managing the amount of carbohydrate (carbs) you eat is an important part of healthy meals when you have diabetes. Carbohydrate is found in many foods. · Learn which foods have carbs. And learn the amounts of carbs in different foods. ¨ Bread, cereal, pasta, and rice have about 15 grams of carbs in a serving. A serving is 1 slice of bread (1 ounce), ½ cup of cooked cereal, or 1/3 cup of cooked pasta or rice. ¨ Fruits have 15 grams of carbs in a serving.  A serving is 1 small fresh fruit, such as an apple or orange; ½ of a banana; ½ cup of cooked or canned fruit; ½ cup of fruit juice; 1 cup of melon or raspberries; or 2 tablespoons of dried fruit. ¨ Milk and no-sugar-added yogurt have 15 grams of carbs in a serving. A serving is 1 cup of milk or 2/3 cup of no-sugar-added yogurt. ¨ Starchy vegetables have 15 grams of carbs in a serving. A serving is ½ cup of mashed potatoes or sweet potato; 1 cup winter squash; ½ of a small baked potato; ½ cup of cooked beans; or ½ cup cooked corn or green peas. · Learn how much carbs to eat each day and at each meal. A dietitian or CDE can teach you how to keep track of the amount of carbs you eat. This is called carbohydrate counting. · If you are not sure how to count carbohydrate grams, use the Plate Method to plan meals. It is a good, quick way to make sure that you have a balanced meal. It also helps you spread carbs throughout the day. ¨ Divide your plate by types of foods. Put non-starchy vegetables on half the plate, meat or other protein food on one-quarter of the plate, and a grain or starchy vegetable in the final quarter of the plate. To this you can add a small piece of fruit and 1 cup of milk or yogurt, depending on how many carbs you are supposed to eat at a meal.  · Try to eat about the same amount of carbs at each meal. Do not \"save up\" your daily allowance of carbs to eat at one meal.  · Proteins have very little or no carbs per serving. Examples of proteins are beef, chicken, turkey, fish, eggs, tofu, cheese, cottage cheese, and peanut butter. A serving size of meat is 3 ounces, which is about the size of a deck of cards. Examples of meat substitute serving sizes (equal to 1 ounce of meat) are 1/4 cup of cottage cheese, 1 egg, 1 tablespoon of peanut butter, and ½ cup of tofu. How can you eat out and still eat healthy? · Learn to estimate the serving sizes of foods that have carbohydrate.  If you measure food at home, it will be easier to estimate the amount in a serving of [employer]. · If the meal you order has too much carbohydrate (such as potatoes, corn, or baked beans), ask to have a low-carbohydrate food instead. Ask for a salad or green vegetables. · If you use insulin, check your blood sugar before and after eating out to help you plan how much to eat in the future. · If you eat more carbohydrate at a meal than you had planned, take a walk or do other exercise. This will help lower your blood sugar. What else should you know? · Limit saturated fat, such as the fat from meat and dairy products. This is a healthy choice because people who have diabetes are at higher risk of heart disease. So choose lean cuts of meat and nonfat or low-fat dairy products. Use olive or canola oil instead of butter or shortening when cooking. · Don't skip meals. Your blood sugar may drop too low if you skip meals and take insulin or certain medicines for diabetes. · Check with your doctor before you drink alcohol. Alcohol can cause your blood sugar to drop too low. Alcohol can also cause a bad reaction if you take certain diabetes medicines. Follow-up care is a key part of your treatment and safety. Be sure to make and go to all appointments, and call your doctor if you are having problems. It's also a good idea to know your test results and keep a list of the medicines you take. Where can you learn more? Go to http://brad-amina.info/. Enter Z348 in the search box to learn more about \"Learning About Diabetes Food Guidelines. \"  Current as of: March 13, 2017  Content Version: 11.3  © 8573-9435 [employer]. Care instructions adapted under license by [employer] (which disclaims liability or warranty for this information). If you have questions about a medical condition or this instruction, always ask your healthcare professional. [address] any warranty or liability for your use of this information.

## 2017-09-08 NOTE — PROGRESS NOTES
1. Have you been to the ER, urgent care clinic since your last visit? Hospitalized since your last visit? [facility] 7/18/17    2. Have you seen or consulted any other health care providers outside of the [address] [place] [address] since your last visit? Include any pap smears or colon screening. No    Patient had foot exam in 7/2017. Also, patient states has an endocrinology appointment in November. Patient has not had flu vaccine. Patient has not had pneumonia vaccine.

## 2017-09-08 NOTE — PROGRESS NOTES
Seen pt today. Review during visit. Probably due to liver metastasis. Low H & H and high wbc could be related to his chemo. Will consider repeat labs. Thanks.

## 2017-09-12 NOTE — PROGRESS NOTES
Called patient spoke with his girlfriend. Problem with the meter is that the patient is out of test strips. Patient was given a freestyle meter. Patient was taking his meter to [facility] so that they could help him get test strips. I told his girlfriend to have him call me if the test strips are too expensive so that we can get him on a meter with cheaper strips.

## 2017-09-13 NOTE — H&P
OUTPATIENT HISTORY AND PHYSICAL      Today 9/13/2017     Indication/Symptoms:   Christine Samuels is a 47 y.o. male here for US guided paracentesis. Ascites. Current Meds:    Prior to Admission medications    Medication Sig Start Date End Date Taking? Authorizing Provider   insulin glargine (LANTUS,BASAGLAR) 100 unit/mL (3 mL) inpn 24 units at bedtime 9/8/17   Jonnie Reddy MD   insulin aspart (NOVOLOG FLEXPEN) 100 unit/mL inpn Use 8 units each meal 9/8/17   Jonnie Reddy MD   BD INSULIN PEN NEEDLE UF SHORT 31 gauge x 5/16\" ndle Use 4 times a day 9/8/17   Jonnie Reddy MD   VENTOLIN HFA 90 mcg/actuation inhaler inhale 2 puffs by mouth every 6 hours if needed 8/9/17   Historical Provider   zolpidem (AMBIEN) 5 mg tablet Take 1 Tab by mouth nightly as needed. 8/29/17   Historical Provider   apixaban (ELIQUIS) 5 mg tablet Take 5 mg by mouth two (2) times a day. Historical Provider   furosemide (LASIX) 20 mg tablet 1 pill daily in am as needed. 9/8/17   Jonnie Reddy MD   Blood-Glucose Meter monitoring kit Needs free style glucometer 8/22/17   Jonnie Reddy MD   amLODIPine (NORVASC) 10 mg tablet Take 1 Tab by mouth daily. 8/7/17   Jonnie Reddy MD   glucose blood VI test strips (FREESTYLE TEST) strip Check 3 times a day 4/21/17   Jonnie Reddy MD   Lancets misc Check twice daily 4/18/17   Jonnie Reddy MD   enalapril (VASOTEC) 5 mg tablet Take  by mouth daily. Historical Provider   metoprolol tartrate (LOPRESSOR) 25 mg tablet Take 25 mg by mouth two (2) times a day. Historical Provider   HYDROcodone-acetaminophen (VICODIN) 5-300 mg tablet Take 1 Tab by mouth every eight (8) hours as needed. Historical Provider   ferrous sulfate (IRON) 325 mg (65 mg iron) tablet Take 65 mg by mouth Daily (before breakfast). Historical Provider   acidophilus-pectin, citrus (ACIDOPHILUS PROBIOTIC) 100 million cell-10 mg cap Take  by mouth.     Historical Provider   multivitamin (MEN'S MULTI-VITAMIN) tablet Take 1 Tab by mouth daily. Historical Provider       Allergies:    No Known Allergies    Comorbid Conditions:    Past Medical History:   Diagnosis Date    Bleeding     [facility] ([facility]     Diabetes ([facility]     HTN (hypertension)     Liver cancer ([facility]         No past surgical history on file. Data:    There were no vitals taken for this visit.:  No results for input(s): PLT, PLTEXT in the last 72 hours. No lab exists for component:  HCT  No results for input(s): INR, APTT in the last 72 hours. No lab exists for component: PT, INREXT    The H & P and/or progress notes and any available imaging were reviewed. The risks, indications and possible alternatives to the procedure, including doing nothing, were discussed and informed consent was obtained. Physical Exam:      Mental status:   Alert and oriented. Examination specific to the procedure proposed to be performed and any co morbid conditions:   Mallampati classification 2 ,  ASA3   Heart:   RRR. Lungs:   CTAB. No wheezes, rales or rhonchi. The patient is an appropriate candidate to undergo the planned procedure and sedation.     Connie Dubois MD

## 2017-09-14 NOTE — ROUTINE PROCESS
I have reviewed discharge instructions with the patient. The patient verbalized understanding. Patient armband removed and shredded Pt. Wheeled out of ED by wife who is present to drive him home.

## 2017-09-14 NOTE — DISCHARGE INSTRUCTIONS

## 2017-09-14 NOTE — ED PROVIDER NOTES
HPI Comments: 1:59 AM  The patient is a 47 y.o. male, former smoker with hx of colon and liver cancer, who presents to the ED with severe, sharp epigastric pain with onset of 8pm tonight. Pt also complains of 2 episodes of emesis and diarrhea. Pt reports that he had a paracentesis procedure done today and that the pain started after the procedure. At the time of evaluation, pt reports that using the bathroom has decreased the intensity of his pain to some extent. No abx intake reported over the past few months. PCP: Liset Gomez MD              The history is provided by the patient. No  was used. Past Medical History:   Diagnosis Date    Bleeding     [facility] ([facility]     Diabetes ([facility]     HTN (hypertension)     [facility]        History reviewed. No pertinent surgical history. Family History:   Problem Relation Age of Onset    Cancer Father      lung       Social History     Social History    Marital status: SINGLE     Spouse name: N/A    Number of children: N/A    Years of education: N/A     Occupational History    Not on file. Social History Main Topics    Smoking status: Former Smoker     Types: Cigarettes     Quit date: 3/16/2017    Smokeless tobacco: Never Used    Alcohol use No    Drug use: No    Sexual activity: Yes     Partners: Female     Other Topics Concern    Not on file     Social History Narrative         ALLERGIES: Review of patient's allergies indicates no known allergies. Review of Systems   Constitutional: Negative for activity change, appetite change, diaphoresis and fever. HENT: Negative for congestion, dental problem, ear pain, hearing loss, nosebleeds, postnasal drip, sinus pressure, sneezing and tinnitus. Eyes: Negative for photophobia, discharge, redness and visual disturbance. Respiratory: Negative for cough, choking, shortness of breath, wheezing and stridor.     Cardiovascular: Negative for chest pain, palpitations and leg swelling. Gastrointestinal: Positive for abdominal pain (epigastric), diarrhea and vomiting. Negative for abdominal distention, anal bleeding and blood in stool. Pain denied in other regions of abdomen   Genitourinary: Negative for decreased urine volume, difficulty urinating, discharge, dysuria, frequency, hematuria, penile swelling, scrotal swelling, testicular pain and urgency. Musculoskeletal: Negative for arthralgias, back pain, gait problem, joint swelling, myalgias and neck pain. Skin: Negative for color change and pallor. Neurological: Negative for dizziness, tremors, seizures, syncope and headaches. Hematological: Negative for adenopathy. Does not bruise/bleed easily. Psychiatric/Behavioral: Negative for agitation, behavioral problems, confusion and hallucinations. The patient is not nervous/anxious. Vitals:    09/14/17 0106   BP: 93/63   Pulse: 84   Resp: 18   Temp: 97.9 °F (36.6 °C)   SpO2: 99%            Physical Exam   Constitutional: He appears well-developed. He appears distressed (moderately). Cardiovascular: Normal rate and regular rhythm. Exam reveals no gallop and no friction rub. No murmur heard. Abdominal: There is tenderness (epigastric tenderness to palpation). Abdomen has a fluid wave  No tenderness in other areas of the abdomen   Musculoskeletal: Normal range of motion. He exhibits edema (2+ on bilateral LEs).    Skin:   No jaundice        MDM  ED Course       Procedures           Vitals:  Patient Vitals for the past 12 hrs:   Temp Pulse Resp BP SpO2   09/14/17 0106 97.9 °F (36.6 °C) 84 18 93/63 99 %       Medications Ordered:  Medications   morphine injection 4 mg (4 mg IntraVENous Given 9/14/17 0231)   ondansetron (ZOFRAN) injection 4 mg (4 mg IntraVENous Given 9/14/17 0231)       Lab Findings:  Recent Results (from the past 12 hour(s))   CBC WITH AUTOMATED DIFF    Collection Time: 09/14/17  1:54 AM   Result Value Ref Range    WBC 18.0 (H) 4.6 - 13.2 K/uL    RBC 3.51 (L) 4.70 - 5.50 M/uL    HGB 10.8 (L) 13.0 - 16.0 g/dL    HCT 31.9 (L) 36.0 - 48.0 %    MCV 90.9 74.0 - 97.0 FL    MCH 30.8 24.0 - 34.0 PG    MCHC 33.9 31.0 - 37.0 g/dL    RDW 19.6 (H) 11.6 - 14.5 %    PLATELET 601 347 - 386 K/uL    MPV 11.1 9.2 - 11.8 FL    NEUTROPHILS 80 (H) 42 - 75 %    BAND NEUTROPHILS 6 (H) 0 - 5 %    LYMPHOCYTES 6 (L) 20 - 51 %    MONOCYTES 6 2 - 9 %    EOSINOPHILS 1 0 - 5 %    BASOPHILS 1 0 - 3 %    ABS. NEUTROPHILS 15.4 (H) 1.8 - 8.0 K/UL    ABS. LYMPHOCYTES 1.1 0.8 - 3.5 K/UL    ABS. MONOCYTES 1.1 (H) 0 - 1.0 K/UL    ABS. EOSINOPHILS 0.2 0.0 - 0.4 K/UL    ABS. BASOPHILS 0.2 (H) 0.0 - 0.06 K/UL    DF AUTOMATED      PLATELET COMMENTS ADEQUATE PLATELETS      RBC COMMENTS ANISOCYTOSIS  1+       METABOLIC PANEL, COMPREHENSIVE    Collection Time: 09/14/17  1:54 AM   Result Value Ref Range    Sodium 137 136 - 145 mmol/L    Potassium 4.4 3.5 - 5.5 mmol/L    Chloride 105 100 - 108 mmol/L    CO2 24 21 - 32 mmol/L    Anion gap 8 3.0 - 18 mmol/L    Glucose 62 (L) 74 - 99 mg/dL    BUN 23 (H) 7.0 - 18 MG/DL    Creatinine 0.92 0.6 - 1.3 MG/DL    BUN/Creatinine ratio 25 (H) 12 - 20      GFR est AA >60 >60 ml/min/1.73m2    GFR est non-AA >60 >60 ml/min/1.73m2    Calcium 8.2 (L) 8.5 - 10.1 MG/DL    Bilirubin, total 2.2 (H) 0.2 - 1.0 MG/DL    ALT (SGPT) 32 16 - 61 U/L    AST (SGOT) 135 (H) 15 - 37 U/L    Alk.  phosphatase 627 (H) 45 - 117 U/L    Protein, total 5.6 (L) 6.4 - 8.2 g/dL    Albumin 1.7 (L) 3.4 - 5.0 g/dL    Globulin 3.9 2.0 - 4.0 g/dL    A-G Ratio 0.4 (L) 0.8 - 1.7     PTT    Collection Time: 09/14/17  1:54 AM   Result Value Ref Range    aPTT 28.1 23.0 - 36.4 SEC   PROTHROMBIN TIME + INR    Collection Time: 09/14/17  1:54 AM   Result Value Ref Range    Prothrombin time 17.1 (H) 11.5 - 15.2 sec    INR 1.5 (H) 0.8 - 1.2     LIPASE    Collection Time: 09/14/17  1:54 AM   Result Value Ref Range    Lipase 102 73 - 393 U/L         Progress notes, consult notes, or additional procedure notes:  3:45 AM  Re-evaluated pt who is pain free at this time. All questions answered and understood. Suspect that abdominal pain was secondary to bowel dysfunction. It's unlikely for pt to have had peritonitis that has completely resolved. Will discharge pt to follow up with regular doctor. Pt is advised to return to the ED with worsening of his sx. Diagnosis:   1. Abdominal pain, epigastric        Disposition: Discharged    Follow-up Information     Follow up With Details Comments Contact Info    Bandar Sorenson MD Schedule an appointment as soon as possible for a visit in 2 days  [address] [place] [facility]  As needed, If symptoms worsen [address] [address]  [phone]           Patient's Medications   Start Taking    No medications on file   Continue Taking    ACIDOPHILUS-PECTIN, CITRUS (ACIDOPHILUS PROBIOTIC) 100 MILLION CELL-10 MG CAP    Take  by mouth. AMLODIPINE (NORVASC) 10 MG TABLET    Take 1 Tab by mouth daily. APIXABAN (ELIQUIS) 5 MG TABLET    Take 5 mg by mouth two (2) times a day. BD INSULIN PEN NEEDLE UF SHORT 31 GAUGE X 5/16\" NDLE    Use 4 times a day    BLOOD-GLUCOSE METER MONITORING KIT    Needs free style glucometer    ENALAPRIL (VASOTEC) 5 MG TABLET    Take  by mouth daily. FERROUS SULFATE (IRON) 325 MG (65 MG IRON) TABLET    Take 65 mg by mouth Daily (before breakfast). FUROSEMIDE (LASIX) 20 MG TABLET    1 pill daily in am as needed. GLUCOSE BLOOD VI TEST STRIPS (FREESTYLE TEST) STRIP    Check 3 times a day    HYDROCODONE-ACETAMINOPHEN (VICODIN) 5-300 MG TABLET    Take 1 Tab by mouth every eight (8) hours as needed.     INSULIN ASPART (NOVOLOG FLEXPEN) 100 UNIT/ML INPN    Use 8 units each meal    INSULIN GLARGINE (LANTUS,BASAGLAR) 100 UNIT/ML (3 ML) INPN    24 units at bedtime    LANCETS MISC    Check twice daily    METOPROLOL TARTRATE (LOPRESSOR) 25 MG TABLET    Take 25 mg by mouth two (2) times a day. MULTIVITAMIN (MEN'S MULTI-VITAMIN) TABLET    Take 1 Tab by mouth daily. VENTOLIN HFA 90 MCG/ACTUATION INHALER    inhale 2 puffs by mouth every 6 hours if needed    ZOLPIDEM (AMBIEN) 5 MG TABLET    Take 1 Tab by mouth nightly as needed. These Medications have changed    No medications on file   Stop Taking    No medications on file       Scribe Attestation      Michaelle Downing acting as a scribe for and in the presence of Geovani Nicholas MD      September 14, 2017 at 1:59 AMm       Provider Attestation:      I personally performed the services described in the documentation, reviewed the documentation, as recorded by the scribe in my presence, and it accurately and completely records my words and actions.  September 14, 2017 at 1:59 AM - Geovani Nicholas MD

## 2017-09-14 NOTE — ED TRIAGE NOTES
Patient A/O x 4, complaining of abdominal pain x 8 pm tonight. Patient vomited x 2. Patient states he had an appointment with radiology and paracentesis was done.

## 2017-09-15 NOTE — TELEPHONE ENCOUNTER
Remind pt that he has to do labs. Need to know how is his diabetes doing. Also ? On diflucan. Not refilled at this time. Need more info. Thanks. No

## 2017-09-20 NOTE — PROCEDURES
RADIOLOGY POST PARACENTESIS NOTE     September 20, 2017       11:09 AM     Preoperative Diagnosis:   Ascites    Postoperative Diagnosis:  Same. :  Noah Sepulveda PA-C    Assistant:  None. Type of Anesthesia: 1% plain lidocaine    Procedure/Description:  US-Guided paracentesis    Findings:  Using ultrasound guidance the largest pocket of peritoneal fluid was localized and marked at the right lower quadrant. The patient was prepped and draped in the usual fashion. 1% Lidocaine was infiltrated locally. A 5 Frisian over the needle catheter was advanced into the peritoneal cavity and clear yellow colored fluid was aspirated. Once fluid was easily aspirated, the needle was removed leaving the catheter in place. The catheter was connected to vacuum containers and 3.4 liters of ascitic fluid was removed.     Estimated blood Loss:  Minimal    Specimen Removed:   Yes    Complications: None    Condition: Stable    Discharge Plan:  discharge home     Hernan Salinas

## 2017-09-27 NOTE — PROCEDURES
RADIOLOGY POST PARACENTESIS NOTE     September 27, 2017       10:53 AM     Preoperative Diagnosis:   Ascites    Postoperative Diagnosis:  Same. :  Le Noyola PA-C    Assistant:  None. Type of Anesthesia: 1% plain lidocaine    Procedure/Description:  US-Guided paracentesis    Findings:  Using ultrasound guidance the largest pocket of peritoneal fluid was localized and marked at the right lower quadrant. The patient was prepped and draped in the usual fashion. 1% Lidocaine was infiltrated locally. A 5 Albanian over the needle catheter was advanced into the peritoneal cavity and clear yellow colored fluid was aspirated. Once fluid was easily aspirated, the needle was removed leaving the catheter in place. The catheter was connected to vacuum containers and 4.7 liters of ascitic fluid was removed.     Estimated blood Loss:  Minimal    Specimen Removed:   Yes    Complications: None    Condition: Stable    Discharge Plan:  discharge home     Hernan Cao

## 2017-09-29 NOTE — PROGRESS NOTES
1. Have you been to the ER, urgent care clinic since your last visit? Hospitalized since your last visit? [facility] 9[facility]14/17    2. Have you seen or consulted any other health care providers outside of the [address] [place] [address] since your last visit? Include any pap smears or colon screening. Dr. Iveth Martin 9/18/17      Patient has not had dm eye exam. Last dm foot exam - [place] (?) 6/2017. Patient has not had flu or pneumonia vaccine. Also, patient states Lasix is not working and believes Lasix makes swelling worse.

## 2017-09-29 NOTE — PATIENT INSTRUCTIONS
Colon Cancer: Care Instructions  Your Care Instructions    Colon cancer occurs when abnormal cells grow out of control in the lower part of your intestine (your colon). If the tumor was small and had not spread, your doctor may have removed it during the colonoscopy. But you may need surgery to remove the cancer if the tumor was too big or had spread too far to be removed during a colonoscopy. If cancer has spread to another part of your body, such as the liver, you may need more far-reaching surgery. Treatment for colon cancer may also include radiation therapy and medicines that destroy cancer cells (chemotherapy). Being treated for cancer can weaken your body, and you may feel very tired. Get the rest your body needs so that you can feel better. When you find out that you have cancer, you may feel many emotions and may need some help coping. Seek out family, friends, and counselors for support. You also can do things at home to make yourself feel better while you go through treatment. Call the [employer] (1-653.132.8017) or visit its website at [employer] for more information. Follow-up care is a key part of your treatment and safety. Be sure to make and go to all appointments, and call your doctor if you are having problems. It's also a good idea to know your test results and keep a list of the medicines you take. How can you care for yourself at home? · Take your medicines exactly as prescribed. Call your doctor if you think you are having a problem with your medicine. You may get medicine for nausea and vomiting if you have these side effects. · Follow your doctor's instructions to relieve pain. Pain from cancer and surgery can almost always be controlled. Use pain medicine when you first notice pain, before it becomes severe. · Eat healthy food. If you do not feel like eating, try to eat food that has protein and extra calories to keep up your strength and prevent weight loss. Drink liquid meal replacements for extra calories and protein. Try to eat your main meal early. · Get some physical activity every day, but do not get too tired. Keep doing the hobbies you enjoy as your energy allows. · Take steps to control your stress and workload. Learn relaxation techniques. ¨ Share your feelings. Stress and tension affect our emotions. By expressing your feelings to others, you may be able to understand and cope with them. ¨ Consider joining a support group. Talking about a problem with your spouse, a good friend, or other people with similar problems is a good way to reduce tension and stress. ¨ Express yourself through art. Try writing, dance, art, or crafts to relieve stress. Some dance, writing, or art groups may be available just for people who have cancer. ¨ Be kind to your body and mind. Getting enough sleep, eating a healthy diet, and taking time to do things you enjoy can contribute to an overall feeling of balance in your life and help reduce stress. ¨ Get help if you need it. Discuss your concerns with your doctor or counselor. · If you are vomiting or have diarrhea:  ¨ Drink plenty of fluids (enough so that your urine is light yellow or clear like water) to prevent dehydration. Choose water and other caffeine-free clear liquids. If you have kidney, heart, or liver disease and have to limit fluids, talk with your doctor before you increase the amount of fluids you drink. ¨ When you are able to eat, try clear soups, mild foods, and liquids until all symptoms are gone for 12 to 48 hours. Other good choices include dry toast, crackers, cooked cereal, and gelatin dessert, such as Jell-O.  · If you have not already done so, prepare a list of advance directives. Advance directives are instructions to your doctor and family members about what kind of care you want if you become unable to speak or express yourself. When should you call for help?   Call 911 anytime you think you may need emergency care. For example, call if:  · You pass maroon or very bloody stools. · You vomit blood or what looks like coffee grounds. · You have sudden chest pain and shortness of breath, or you cough up blood. · You have severe belly pain. Call your doctor now or seek immediate medical care if:  · You have signs of a blood clot, such as:  ¨ Pain in your calf, back of the knee, thigh, or groin. ¨ Redness and swelling in your leg or groin. · You have a fever. · You have nausea or vomiting. · You are very constipated or have diarrhea for several days. · Your stools are black and tarlike or have streaks of blood. Watch closely for changes in your health, and be sure to contact your doctor if:  · Your pain is not controlled by medicine. · Your symptoms get worse or are not improving as expected. Where can you learn more? Go to http://brad-amina.info/. Enter O799 in the search box to learn more about \"Colon Cancer: Care Instructions. \"  Current as of: July 26, 2016  Content Version: 11.3  © 9450-3500 [employer]. Care instructions adapted under license by [employer] (which disclaims liability or warranty for this information). If you have questions about a medical condition or this instruction, always ask your healthcare professional. [address] any warranty or liability for your use of this information. Learning About Diabetes Food Guidelines  Your Care Instructions  Meal planning is important to manage diabetes. It helps keep your blood sugar at a target level (which you set with your doctor). You don't have to eat special foods. You can eat what your family eats, including sweets once in a while. But you do have to pay attention to how often you eat and how much you eat of certain foods. You may want to work with a dietitian or a certified diabetes educator (CDE) to help you plan meals and snacks.  A dietitian or CDE can also help you lose weight if that is one of your goals. What should you know about eating carbs? Managing the amount of carbohydrate (carbs) you eat is an important part of healthy meals when you have diabetes. Carbohydrate is found in many foods. · Learn which foods have carbs. And learn the amounts of carbs in different foods. ¨ Bread, cereal, pasta, and rice have about 15 grams of carbs in a serving. A serving is 1 slice of bread (1 ounce), ½ cup of cooked cereal, or 1/3 cup of cooked pasta or rice. ¨ Fruits have 15 grams of carbs in a serving. A serving is 1 small fresh fruit, such as an apple or orange; ½ of a banana; ½ cup of cooked or canned fruit; ½ cup of fruit juice; 1 cup of melon or raspberries; or 2 tablespoons of dried fruit. ¨ Milk and no-sugar-added yogurt have 15 grams of carbs in a serving. A serving is 1 cup of milk or 2/3 cup of no-sugar-added yogurt. ¨ Starchy vegetables have 15 grams of carbs in a serving. A serving is ½ cup of mashed potatoes or sweet potato; 1 cup winter squash; ½ of a small baked potato; ½ cup of cooked beans; or ½ cup cooked corn or green peas. · Learn how much carbs to eat each day and at each meal. A dietitian or CDE can teach you how to keep track of the amount of carbs you eat. This is called carbohydrate counting. · If you are not sure how to count carbohydrate grams, use the Plate Method to plan meals. It is a good, quick way to make sure that you have a balanced meal. It also helps you spread carbs throughout the day. ¨ Divide your plate by types of foods. Put non-starchy vegetables on half the plate, meat or other protein food on one-quarter of the plate, and a grain or starchy vegetable in the final quarter of the plate.  To this you can add a small piece of fruit and 1 cup of milk or yogurt, depending on how many carbs you are supposed to eat at a meal.  · Try to eat about the same amount of carbs at each meal. Do not \"save up\" your daily allowance of carbs to eat at one meal.  · Proteins have very little or no carbs per serving. Examples of proteins are beef, chicken, turkey, fish, eggs, tofu, cheese, cottage cheese, and peanut butter. A serving size of meat is 3 ounces, which is about the size of a deck of cards. Examples of meat substitute serving sizes (equal to 1 ounce of meat) are 1/4 cup of cottage cheese, 1 egg, 1 tablespoon of peanut butter, and ½ cup of tofu. How can you eat out and still eat healthy? · Learn to estimate the serving sizes of foods that have carbohydrate. If you measure food at home, it will be easier to estimate the amount in a serving of restaurant food. · If the meal you order has too much carbohydrate (such as potatoes, corn, or baked beans), ask to have a low-carbohydrate food instead. Ask for a salad or green vegetables. · If you use insulin, check your blood sugar before and after eating out to help you plan how much to eat in the future. · If you eat more carbohydrate at a meal than you had planned, take a walk or do other exercise. This will help lower your blood sugar. What else should you know? · Limit saturated fat, such as the fat from meat and dairy products. This is a healthy choice because people who have diabetes are at higher risk of heart disease. So choose lean cuts of meat and nonfat or low-fat dairy products. Use olive or canola oil instead of butter or shortening when cooking. · Don't skip meals. Your blood sugar may drop too low if you skip meals and take insulin or certain medicines for diabetes. · Check with your doctor before you drink alcohol. Alcohol can cause your blood sugar to drop too low. Alcohol can also cause a bad reaction if you take certain diabetes medicines. Follow-up care is a key part of your treatment and safety. Be sure to make and go to all appointments, and call your doctor if you are having problems.  It's also a good idea to know your test results and keep a list of the medicines you take.  Where can you learn more? Go to http://brad-amina.info/. Enter L357 in the search box to learn more about \"Learning About Diabetes Food Guidelines. \"  Current as of: March 13, 2017  Content Version: 11.3  © 5926-5299 [employer]. Care instructions adapted under license by [employer] (which disclaims liability or warranty for this information). If you have questions about a medical condition or this instruction, always ask your healthcare professional. [place] [address] any warranty or liability for your use of this information.

## 2017-09-29 NOTE — MR AVS SNAPSHOT
Visit Information Date & Time Provider Department Dept. Phone Encounter #  
 9/29/2017 11:45 AM Nakita Quigley, [address] [id number] Follow-up Instructions Return in about 4 months (around 1/29/2018). Upcoming Health Maintenance Date Due  
 FOOT EXAM Q1 3/14/1976 EYE EXAM RETINAL OR DILATED Q1 3/14/1976 Pneumococcal 19-64 Highest Risk (1 of 3 - PCV13) 3/14/1985 INFLUENZA AGE 9 TO ADULT 8/1/2017 HEMOGLOBIN A1C Q6M 10/18/2017 MICROALBUMIN Q1 4/18/2018 LIPID PANEL Q1 4/18/2018 COLONOSCOPY 4/4/2019 DTaP/Tdap/Td series (2 - Td) 3/28/2027 Allergies as of 9/29/2017  Review Complete On: 9/29/2017 By: Rosa Contreras No Known Allergies Current Immunizations  Reviewed on 9/29/2017 Name Date Tdap 3/28/2017 Reviewed by Nakita Quigley MD on 9/29/2017 at 12:59 PM  
You Were Diagnosed With   
  
 Codes Comments Well controlled diabetes mellitus ([facility] [address]    -  Primary ICD-10-CM: E11.9 ICD-9-CM: 250.00 Malignant neoplasm of colon, unspecified part of colon ([facility] [address]     ICD-10-CM: C18.9 ICD-9-CM: 153.9 Pulmonary embolism, other ([facility] [address] [facility]     ICD-10-CM: I26.99 
ICD-9-CM: 415.19 Vitals BP Pulse Temp Resp Height(growth percentile) Weight(growth percentile) 92/60 (BP 1 Location: Left arm, BP Patient Position: Sitting) (!) 103 98 °F (36.7 °C) (Oral) 16 5' 7\" (1.702 m) 157 lb (71.2 kg) SpO2 BMI Smoking Status 99% 24.59 kg/m2 Former Smoker Vitals History BMI and BSA Data Body Mass Index Body Surface Area 24.59 kg/m 2 1.83 m 2 Preferred Pharmacy Pharmacy Name Phone [facility], [address] [place] [address] [phone] Your Updated Medication List  
  
   
This list is accurate as of: 9/29/17  1:00 PM.  Always use your most recent med list.  
  
  
  
  
 ACIDOPHILUS PROBIOTIC 100 million cell-10 mg Cap Generic drug:  acidophilus-pectin, citrus Take  by mouth. BD INSULIN PEN NEEDLE UF SHORT 31 gauge x 5/16\" Ndle Generic drug:  Insulin Needles (Disposable) Use 4 times a day Blood-Glucose Meter monitoring kit Needs free style glucometer ELIQUIS 5 mg tablet Generic drug:  apixaban Take 5 mg by mouth two (2) times a day. glucose blood VI test strips strip Commonly known as:  FREESTYLE TEST Check 3 times a day HYDROcodone-acetaminophen 5-325 mg per tablet Commonly known as:  Gleda Ridgel Take 1 Tab by mouth every six (6) hours as needed. insulin aspart 100 unit/mL Inpn Commonly known as:  Ina Beams Use 8 units each meal  
  
 insulin glargine 100 unit/mL (3 mL) Inpn Commonly known as:  LANTUSBASAGLAR  
24 units at bedtime Iron 325 mg (65 mg iron) tablet Generic drug:  ferrous sulfate Take 65 mg by mouth Daily (before breakfast). * Lancets Misc Check twice daily * FREESTYLE LANCETS 28 gauge Misc Generic drug:  lancets USE LANCET TO CHECK BLOOD GLUCOSE 4 TIMES DAILY  
  
 LONSURF 20-8. 19 mg Tab Generic drug:  trifluridine-tipiracil MEN'S MULTI-VITAMIN tablet Generic drug:  multivitamin Take 1 Tab by mouth daily. VENTOLIN HFA 90 mcg/actuation inhaler Generic drug:  albuterol  
inhale 2 puffs by mouth every 6 hours if needed  
  
 zolpidem 5 mg tablet Commonly known as:  AMBIEN Take 1 Tab by mouth nightly as needed. * Notice: This list has 2 medication(s) that are the same as other medications prescribed for you. Read the directions carefully, and ask your doctor or other care provider to review them with you. Follow-up Instructions Return in about 4 months (around 1/29/2018). To-Do List   
 10/04/2017 9:00 AM  
  Appointment with [facility] ANGIO RADIOLOGIST; [address] 2 at [address] [facility] ([phone]) OUTSIDE FILMS  - Any outside films related to the study being scheduled should be brought with you on the day of the exam.  If this cannot be done there may be a delay in the reading of the study. MEDICATIONS  - Patient must bring a complete list of all medications currently taking to include prescriptions, over-the-counter meds, herbals, vitamins & any dietary supplements 10/11/2017 9:00 AM  
  Appointment with [facility] ANGIO RADIOLOGIST; [facility] [address] at [address] [place] [address] ([phone]) OUTSIDE FILMS  - Any outside films related to the study being scheduled should be brought with you on the day of the exam.  If this cannot be done there may be a delay in the reading of the study. MEDICATIONS  - Patient must bring a complete list of all medications currently taking to include prescriptions, over-the-counter meds, herbals, vitamins & any dietary supplements 10/18/2017 9:00 AM  
  Appointment with [facility] ANGIO RADIOLOGIST; [facility] [address] at [address] [place] [address] ([phone]) OUTSIDE FILMS  - Any outside films related to the study being scheduled should be brought with you on the day of the exam.  If this cannot be done there may be a delay in the reading of the study. MEDICATIONS  - Patient must bring a complete list of all medications currently taking to include prescriptions, over-the-counter meds, herbals, vitamins & any dietary supplements 10/25/2017 9:00 AM  
  Appointment with [facility] ANGIO RADIOLOGIST; [facility] [address] at [address] [place] [address] ([phone]) OUTSIDE FILMS  - Any outside films related to the study being scheduled should be brought with you on the day of the exam.  If this cannot be done there may be a delay in the reading of the study. MEDICATIONS  - Patient must bring a complete list of all medications currently taking to include prescriptions, over-the-counter meds, herbals, vitamins & any dietary supplements 11/01/2017 9:00 AM  
  Appointment with [facility] ANGIO RADIOLOGIST; [facility] [address] at [address] [place] [address] ([phone]) OUTSIDE FILMS  - Any outside films related to the study being scheduled should be brought with you on the day of the exam.  If this cannot be done there may be a delay in the reading of the study. MEDICATIONS  - Patient must bring a complete list of all medications currently taking to include prescriptions, over-the-counter meds, herbals, vitamins & any dietary supplements 11/02/2017 3:00 PM  
  Appointment with Claire Mcdonald RD at [address] [facility] NUTRITION  
  
 11/08/2017 9:00 AM  
  Appointment with [address] [place] [address] [address] [place]; [address] [facility] 2 at [address] [facility] ([phone]) OUTSIDE FILMS  - Any outside films related to the study being scheduled should be brought with you on the day of the exam.  If this cannot be done there may be a delay in the reading of the study. MEDICATIONS  - Patient must bring a complete list of all medications currently taking to include prescriptions, over-the-counter meds, herbals, vitamins & any dietary supplements Patient Instructions Colon Cancer: Care Instructions Your Care Instructions Colon cancer occurs when abnormal cells grow out of control in the lower part of your intestine (your colon). If the tumor was small and had not spread, your doctor may have removed it during the colonoscopy. But you may need surgery to remove the cancer if the tumor was too big or had spread too far to be removed during a colonoscopy. If cancer has spread to another part of your body, such as the liver, you may need more far-reaching surgery. Treatment for colon cancer may also include radiation therapy and medicines that destroy cancer cells (chemotherapy). Being treated for cancer can weaken your body, and you may feel very tired. Get the rest your body needs so that you can feel better. When you find out that you have cancer, you may feel many emotions and may need some help coping.  Seek out family, friends, and counselors for support. You also can do things at home to make yourself feel better while you go through treatment. Call the [facility] [employer] ([phone]) or visit its website at [address] for more information. Follow-up care is a key part of your treatment and safety. Be sure to make and go to all appointments, and call your doctor if you are having problems. It's also a good idea to know your test results and keep a list of the medicines you take. How can you care for yourself at home? · Take your medicines exactly as prescribed. Call your doctor if you think you are having a problem with your medicine. You may get medicine for nausea and vomiting if you have these side effects. · Follow your doctor's instructions to relieve pain. Pain from cancer and surgery can almost always be controlled. Use pain medicine when you first notice pain, before it becomes severe. · Eat healthy food. If you do not feel like eating, try to eat food that has protein and extra calories to keep up your strength and prevent weight loss. Drink liquid meal replacements for extra calories and protein. Try to eat your main meal early. · Get some physical activity every day, but do not get too tired. Keep doing the hobbies you enjoy as your energy allows. · Take steps to control your stress and workload. Learn relaxation techniques. ¨ Share your feelings. Stress and tension affect our emotions. By expressing your feelings to others, you may be able to understand and cope with them. ¨ Consider joining a support group. Talking about a problem with your spouse, a good friend, or other people with similar problems is a good way to reduce tension and stress. ¨ Express yourself through art. Try writing, dance, art, or crafts to relieve stress. Some dance, writing, or art groups may be available just for people who have cancer. ¨ Be kind to your body and mind.  Getting enough sleep, eating a healthy diet, and taking time to do things you enjoy can contribute to an overall feeling of balance in your life and help reduce stress. ¨ Get help if you need it. Discuss your concerns with your doctor or counselor. · If you are vomiting or have diarrhea: ¨ Drink plenty of fluids (enough so that your urine is light yellow or clear like water) to prevent dehydration. Choose water and other caffeine-free clear liquids. If you have kidney, heart, or liver disease and have to limit fluids, talk with your doctor before you increase the amount of fluids you drink. ¨ When you are able to eat, try clear soups, mild foods, and liquids until all symptoms are gone for 12 to 48 hours. Other good choices include dry toast, crackers, cooked cereal, and gelatin dessert, such as Jell-O. · If you have not already done so, prepare a list of advance directives. Advance directives are instructions to your doctor and family members about what kind of care you want if you become unable to speak or express yourself. When should you call for help? Call 911 anytime you think you may need emergency care. For example, call if: 
· You pass maroon or very bloody stools. · You vomit blood or what looks like coffee grounds. · You have sudden chest pain and shortness of breath, or you cough up blood. · You have severe belly pain. Call your doctor now or seek immediate medical care if: 
· You have signs of a blood clot, such as: 
¨ Pain in your calf, back of the knee, thigh, or groin. ¨ Redness and swelling in your leg or groin. · You have a fever. · You have nausea or vomiting. · You are very constipated or have diarrhea for several days. · Your stools are black and tarlike or have streaks of blood. Watch closely for changes in your health, and be sure to contact your doctor if: 
· Your pain is not controlled by medicine. · Your symptoms get worse or are not improving as expected. Where can you learn more? Go to http://brad-amina.info/. Enter K811 in the search box to learn more about \"Colon Cancer: Care Instructions. \" Current as of: July 26, 2016 Content Version: 11.3 © 2011-5924 [employer]. Care instructions adapted under license by [employer] (which disclaims liability or warranty for this information). If you have questions about a medical condition or this instruction, always ask your healthcare professional. [address] any warranty or liability for your use of this information. Learning About Diabetes Food Guidelines Your Care Instructions Meal planning is important to manage diabetes. It helps keep your blood sugar at a target level (which you set with your doctor). You don't have to eat special foods. You can eat what your family eats, including sweets once in a while. But you do have to pay attention to how often you eat and how much you eat of certain foods. You may want to work with a dietitian or a certified diabetes educator (CDE) to help you plan meals and snacks. A dietitian or CDE can also help you lose weight if that is one of your goals. What should you know about eating carbs? Managing the amount of carbohydrate (carbs) you eat is an important part of healthy meals when you have diabetes. Carbohydrate is found in many foods. · Learn which foods have carbs. And learn the amounts of carbs in different foods. ¨ Bread, cereal, pasta, and rice have about 15 grams of carbs in a serving. A serving is 1 slice of bread (1 ounce), ½ cup of cooked cereal, or 1/3 cup of cooked pasta or rice. ¨ Fruits have 15 grams of carbs in a serving. A serving is 1 small fresh fruit, such as an apple or orange; ½ of a banana; ½ cup of cooked or canned fruit; ½ cup of fruit juice; 1 cup of melon or raspberries; or 2 tablespoons of dried fruit. ¨ Milk and no-sugar-added yogurt have 15 grams of carbs in a serving.  A serving is 1 cup of milk or 2/3 cup of no-sugar-added yogurt. ¨ Starchy vegetables have 15 grams of carbs in a serving. A serving is ½ cup of mashed potatoes or sweet potato; 1 cup winter squash; ½ of a small baked potato; ½ cup of cooked beans; or ½ cup cooked corn or green peas. · Learn how much carbs to eat each day and at each meal. A dietitian or CDE can teach you how to keep track of the amount of carbs you eat. This is called carbohydrate counting. · If you are not sure how to count carbohydrate grams, use the Plate Method to plan meals. It is a good, quick way to make sure that you have a balanced meal. It also helps you spread carbs throughout the day. ¨ Divide your plate by types of foods. Put non-starchy vegetables on half the plate, meat or other protein food on one-quarter of the plate, and a grain or starchy vegetable in the final quarter of the plate. To this you can add a small piece of fruit and 1 cup of milk or yogurt, depending on how many carbs you are supposed to eat at a meal. 
· Try to eat about the same amount of carbs at each meal. Do not \"save up\" your daily allowance of carbs to eat at one meal. 
· Proteins have very little or no carbs per serving. Examples of proteins are beef, chicken, turkey, fish, eggs, tofu, cheese, cottage cheese, and peanut butter. A serving size of meat is 3 ounces, which is about the size of a deck of cards. Examples of meat substitute serving sizes (equal to 1 ounce of meat) are 1/4 cup of cottage cheese, 1 egg, 1 tablespoon of peanut butter, and ½ cup of tofu. How can you eat out and still eat healthy? · Learn to estimate the serving sizes of foods that have carbohydrate. If you measure food at home, it will be easier to estimate the amount in a serving of restaurant food. · If the meal you order has too much carbohydrate (such as potatoes, corn, or baked beans), ask to have a low-carbohydrate food instead. Ask for a salad or green vegetables. · If you use insulin, check your blood sugar before and after eating out to help you plan how much to eat in the future. · If you eat more carbohydrate at a meal than you had planned, take a walk or do other exercise. This will help lower your blood sugar. What else should you know? · Limit saturated fat, such as the fat from meat and dairy products. This is a healthy choice because people who have diabetes are at higher risk of heart disease. So choose lean cuts of meat and nonfat or low-fat dairy products. Use olive or canola oil instead of butter or shortening when cooking. · Don't skip meals. Your blood sugar may drop too low if you skip meals and take insulin or certain medicines for diabetes. · Check with your doctor before you drink alcohol. Alcohol can cause your blood sugar to drop too low. Alcohol can also cause a bad reaction if you take certain diabetes medicines. Follow-up care is a key part of your treatment and safety. Be sure to make and go to all appointments, and call your doctor if you are having problems. It's also a good idea to know your test results and keep a list of the medicines you take. Where can you learn more? Go to http://brad-amina.info/. Enter V946 in the search box to learn more about \"Learning About Diabetes Food Guidelines. \" Current as of: March 13, 2017 Content Version: 11.3 © 6771-5714 [employer]. Care instructions adapted under license by [employer] (which disclaims liability or warranty for this information). If you have questions about a medical condition or this instruction, always ask your healthcare professional. [address] any warranty or liability for your use of this information. Please provide this summary of care documentation to your next provider. Your primary care clinician is listed as Fadumo Moore.  If you have any questions after today's visit, please call [phone].

## 2017-09-29 NOTE — PROGRESS NOTES
HISTORY OF PRESENT ILLNESS  Ryan Fregoso is a 47 y.o. male. HPI: here for follow up. Colon cancer . Getting treated by Dr. Mason Shaikh. Liver metastasis. Now complicated with ascites. On chemo. Abnormal cbc due to effect of chemo. Managed by oncology. Now scheduled for weekly ascitic taping. Today sitting comfortable. No sob. No nausea or vomiting. No cough. No sob. No chest pain. No wheezing. Appetite fair. Fatigue stable. H/o diabetes. Review endo notes and noted HBA1C from 12 to now 4.2. Has made adjustment in insulin dose. Recently had hypoglycemia. Review instructions again with pt and wife per endocrinologist recommendations. Visit Vitals    BP 92/60 (BP 1 Location: Left arm, BP Patient Position: Sitting)    Pulse (!) 103    Temp 98 °F (36.7 °C) (Oral)    Resp 16    Ht 5' 7\" (1.702 m)    Wt 157 lb (71.2 kg)    SpO2 99%    BMI 24.59 kg/m2     Has h/o hypertension. Now off medication due to low blood pressure from chemo. No hypotensive symptoms at this time. ROS: see HPI     Physical Exam   Constitutional: He is oriented to person, place, and time. No distress. Cardiovascular: Normal heart sounds. Pulmonary/Chest: No respiratory distress. He has no wheezes. Abdominal: He exhibits distension (ascitis present ). There is no tenderness. Musculoskeletal: He exhibits no edema. Neurological: He is oriented to person, place, and time. Psychiatric: His behavior is normal.       ASSESSMENT and PLAN    ICD-10-CM ICD-9-CM    1. Well controlled diabetes mellitus ([facility] [address]): improvement in HBA1C from 12 to now 4.2. Following endocrinology recommendations as given sliding scale and instruction for lantus. Will observe. Any hypoglycemia advised to cut back 2 units. E11.9 250.00    2. Malignant neoplasm of colon, unspecified part of colon (HCC)/ following Dr. Mason Shaikh : on chemo. Repeat CT showed persistant of disease. Liver metastasis. ascites  as complication. C18.9 153.9    3.  Pulmonary embolism, other (HCC)/ post IVC/ on eliquis : for now stable. No sob. No cough or chest congstion. On and off sob but could be from ascities. I26.99 415.19    Pt understood and agree with the plan   Review hM   Discussed with patient about metastasis, persistent cancer on repeat CT. ascities  as complication of liver metastasis. Felt sad with conversation. But able to handle it. Will observe. Follow-up Disposition:  Return in about 4 months (around 1/29/2018).

## 2017-10-04 NOTE — PROCEDURES
RADIOLOGY POST PARACENTESIS NOTE     October 4, 2017       10:53 AM     Preoperative Diagnosis:   Ascites    Postoperative Diagnosis:  Same. :  Chrissy Vanegas PA-C    Assistant:  None. Type of Anesthesia: 1% plain lidocaine    Procedure/Description:  US-Guided paracentesis    Findings:  Using ultrasound guidance the largest pocket of peritoneal fluid was localized and marked at the right lower quadrant. The patient was prepped and draped in the usual fashion. 1% Lidocaine was infiltrated locally. A 5 Danish over the needle catheter was advanced into the peritoneal cavity and clear yellow colored fluid was aspirated. Once fluid was easily aspirated, the needle was removed leaving the catheter in place. The catheter was connected to vacuum containers and 4.45 liters of ascitic fluid was removed.     Estimated blood Loss:  Minimal    Specimen Removed:   Yes    Complications: None    Condition: Stable    Discharge Plan:  discharge home     Chrissy Vanegas, [address]

## 2017-10-11 NOTE — PROCEDURES
RADIOLOGY POST PARACENTESIS NOTE     October 11, 2017       10:21 AM     Preoperative Diagnosis:   Ascites    Postoperative Diagnosis:  Same. :  Dalia Moore PA-C    Assistant:  None. Type of Anesthesia: 1% plain lidocaine    Procedure/Description:  US-Guided paracentesis    Findings:  Using ultrasound guidance the largest pocket of peritoneal fluid was localized and marked at the left lower quadrant. The patient was prepped and draped in the usual fashion. 1% Lidocaine was infiltrated locally. A 5 Urdu over the needle catheter was advanced into the peritoneal cavity and clear yellow colored fluid was aspirated. Once fluid was easily aspirated, the needle was removed leaving the catheter in place. The catheter was connected to vacuum containers and 1.3 liters of ascitic fluid was removed.     Estimated blood Loss:  Minimal    Specimen Removed:   Yes    Complications: None    Condition: Stable    Discharge Plan:  discharge home     Dalia Moore, [address]

## 2017-10-18 NOTE — PROCEDURES
RADIOLOGY POST PARACENTESIS NOTE     October 18, 2017       11:58 AM     Preoperative Diagnosis:   Ascites    Postoperative Diagnosis:  Same. :  Brianne Schneider PA-C    Assistant:  None. Type of Anesthesia: 1% plain lidocaine    Procedure/Description:  US-Guided paracentesis    Findings:  Using ultrasound guidance the largest pocket of peritoneal fluid was localized and marked at the right lower quadrant. The patient was prepped and draped in the usual fashion. 1% Lidocaine was infiltrated locally. A 5 Cymro over the needle catheter was advanced into the peritoneal cavity and clear yellow colored fluid was aspirated. Once fluid was easily aspirated, the needle was removed leaving the catheter in place. The catheter was connected to vacuum containers and 4 liters of ascitic fluid was removed.     Estimated blood Loss:  Minimal    Specimen Removed:   Yes    Complications: None    Condition: Stable    Discharge Plan:  discharge home     Hernan Lam

## 2017-10-25 NOTE — H&P
Interventional Radiology History and Physical    Patient: Aretha Wolfe MRN: [id number]  SSN: [id number]    YOB: 1965  Age: 47 y.o. Sex: male      Subjective:      Aretha Wolfe is a 47 y.o. male who presents for paracentesis. Past Medical History:   Diagnosis Date    Bleeding     Colon cancer ([address]     Diabetes ([facility] [address]     HTN (hypertension)     Liver cancer ([facility] [address]      No past surgical history on file. Family History   Problem Relation Age of Onset    Cancer Father      lung     Social History   Substance Use Topics    Smoking status: Former Smoker     Types: Cigarettes     Quit date: 3/16/2017    Smokeless tobacco: Never Used    Alcohol use No      Prior to Admission medications    Medication Sig Start Date End Date Taking? Authorizing Provider   LONSURF 20-8. 19 mg tab  9/27/17   Historical Provider   FREESTYLE LANCETS 28 gauge misc USE LANCET TO CHECK BLOOD GLUCOSE 4 TIMES DAILY 9/19/17   Historical Provider   HYDROcodone-acetaminophen (NORCO) 5-325 mg per tablet Take 1 Tab by mouth every six (6) hours as needed. 8/10/17   Historical Provider   insulin glargine (LANTUS,BASAGLAR) 100 unit/mL (3 mL) inpn 24 units at bedtime  Patient taking differently: 10 Units by SubCUTAneous route nightly. 24 units at bedtime 9/8/17   Zully Jones MD   insulin aspart (NOVOLOG FLEXPEN) 100 unit/mL inpn Use 8 units each meal 9/8/17   Zully Jones MD   BD INSULIN PEN NEEDLE UF SHORT 31 gauge x 5/16\" ndle Use 4 times a day 9/8/17   Zully Jones MD   VENTOLIN HFA 90 mcg/actuation inhaler inhale 2 puffs by mouth every 6 hours if needed 8/9/17   Historical Provider   zolpidem (AMBIEN) 5 mg tablet Take 1 Tab by mouth nightly as needed. 8/29/17   Historical Provider   apixaban (ELIQUIS) 5 mg tablet Take 5 mg by mouth two (2) times a day.     Historical Provider   Blood-Glucose Meter monitoring kit Needs free style glucometer 8/22/17   Zully Jones MD   glucose blood VI test strips (FREESTYLE TEST) strip Check 3 times a day 4/21/17   Marlon England MD   Lancets misc Check twice daily 4/18/17   Marlon England MD   ferrous sulfate (IRON) 325 mg (65 mg iron) tablet Take 65 mg by mouth Daily (before breakfast). Historical Provider   acidophilus-pectin, citrus (ACIDOPHILUS PROBIOTIC) 100 million cell-10 mg cap Take  by mouth. Historical Provider   multivitamin (MEN'S MULTI-VITAMIN) tablet Take 1 Tab by mouth daily. Historical Provider        No Known Allergies    Review of Systems:  Pertinent items are noted in the History of Present Illness. Objective: There were no vitals filed for this visit. Physical Exam:  GENERAL: alert, cooperative, no distress, appears stated age    Assessment:     Hospital Problems  Date Reviewed: 9/8/2017    None          Plan:     Paracentesis    Signed By: Milvia Mcdonald MD     October 25, 2017      History and Physical reviewed; I have examined the patient and there are no pertinent changes.

## 2017-10-25 NOTE — PROCEDURES
INTERVENTIONAL RADIOLOGY PROCEDURE NOTE:    Procedure: Ultrasound-Guided Paracentesis    Attending: Ted Hubbard MD    Assistant: None    Procedure:  US-guided paracentesis    Indication: Ascites    Pre-operative Diagnosis: Ascites    Post-operative Diagnosis: Same    Anesthesia: Local    Procedure Details:  - Informed consent was obtained. - Time Out was performed. - Permanent image storage performed on PACS. - Standard sterile technique. - Image-guided paracentesis with 5 Turkish sheathed needle. - Please refer to full report on PACS. Specimen: 2.9 L fluid obtained            Complications:  No immediate    Estimated Blood Loss: Minimal           Impression:  Successful paracentesis.     Miguel Trevino MD  10/25/2017

## 2017-11-01 NOTE — PROCEDURES
Interventional Radiology Brief Procedure Note    Interventional Radiologist: Cleo Vicente MD    Pre-operative Diagnosis: Ascites    Post-operative Diagnosis: Same as pre-operative diagnosis    Procedure(s) Performed:  Paracentesis    Anesthesia:  Local and Moderate Sedation    Findings: 2,250 mL dark yellow fluid removed.     Complications: None    Estimated Blood Loss:  minimal    Tubes and Drains: None    Specimens: None    Condition: Good    Disposition: Lian Guerrero MD  11/1/2017

## 2017-11-01 NOTE — H&P
Kevin Gutierrez is a 47 y.o. male with ascites who is here for paracentesis. Past Medical History:   Diagnosis Date    Bleeding     [facility] ([facility] [address]     Diabetes ([facility] [address]     HTN (hypertension)     Liver cancer ([facility] [address]        No Known Allergies    Home Medications:     Prior to Admission medications    Medication Sig Start Date End Date Taking? Authorizing Provider   LONSURF 20-8. 19 mg tab  9/27/17   Historical Provider   FREESTYLE LANCETS 28 gauge misc USE LANCET TO CHECK BLOOD GLUCOSE 4 TIMES DAILY 9/19/17   Historical Provider   HYDROcodone-acetaminophen (NORCO) 5-325 mg per tablet Take 1 Tab by mouth every six (6) hours as needed. 8/10/17   Historical Provider   insulin glargine (LANTUS,BASAGLAR) 100 unit/mL (3 mL) inpn 24 units at bedtime  Patient taking differently: 10 Units by SubCUTAneous route nightly. 24 units at bedtime 9/8/17   Luis Herrera MD   insulin aspart (NOVOLOG FLEXPEN) 100 unit/mL inpn Use 8 units each meal 9/8/17   Luis Herrera MD   BD INSULIN PEN NEEDLE UF SHORT 31 gauge x 5/16\" ndle Use 4 times a day 9/8/17   Luis Herrera MD   VENTOLIN HFA 90 mcg/actuation inhaler inhale 2 puffs by mouth every 6 hours if needed 8/9/17   Historical Provider   zolpidem (AMBIEN) 5 mg tablet Take 1 Tab by mouth nightly as needed. 8/29/17   Historical Provider   apixaban (ELIQUIS) 5 mg tablet Take 5 mg by mouth two (2) times a day. Historical Provider   Blood-Glucose Meter monitoring kit Needs free style glucometer 8/22/17   Luis Herrera MD   glucose blood VI test strips (FREESTYLE TEST) strip Check 3 times a day 4/21/17   Luis Herrera MD   Lancets misc Check twice daily 4/18/17   Luis Herrera MD   ferrous sulfate (IRON) 325 mg (65 mg iron) tablet Take 65 mg by mouth Daily (before breakfast). Historical Provider   acidophilus-pectin, citrus (ACIDOPHILUS PROBIOTIC) 100 million cell-10 mg cap Take  by mouth.     Historical Provider   multivitamin (MEN'S MULTI-VITAMIN) tablet Take 1 Tab by mouth daily. Historical Provider         Data     Basic Metabolic Profile   Lab Results   Component Value Date     09/14/2017    CO2 24 09/14/2017    BUN 23 (H) 09/14/2017    BUN 7 08/10/2017    BUN 9 07/19/2017         CBC w/Diff    Lab Results   Component Value Date/Time    WBC 16.4 (H) 10/18/2017 09:41 AM    WBC 18.0 (H) 09/14/2017 01:54 AM    WBC 35.1 (H) 09/06/2017 10:42 AM    HCT 31.4 (L) 10/18/2017 09:41 AM    HCT 31.9 (L) 09/14/2017 01:54 AM    HCT 32.4 (L) 09/06/2017 10:42 AM    Lab Results   Component Value Date/Time    BANDS 6 (H) 09/14/2017 01:54 AM    MONOS 6 09/14/2017 01:54 AM    EOS 1 09/14/2017 01:54 AM    BASOS 1 09/14/2017 01:54 AM    RDW 20.4 (H) 10/18/2017 09:41 AM        Coagulation   Lab Results   Component Value Date    INR 1.3 (H) 10/18/2017    APTT 32.8 10/18/2017        Hepatic Function    No results found for: ALBUMIN No components found for: SGOTAST, SGPTALT, CONJUGATEDF, BILIT           Physical Assessment:   There were no vitals taken for this visit. Heart: RR  Lungs: clear, no wheezes, rales, ronchi  Psych: appropriate mood and affect      Impression/Plan:   Patient is an appropriate candidate for procedure.       Marita Layton MD  11/1/2017

## 2017-11-15 NOTE — PROCEDURES
RADIOLOGY POST PARACENTESIS NOTE     November 15, 2017       12:33 PM     Preoperative Diagnosis:   Ascites    Postoperative Diagnosis:  Same. :  Chrissy Vanegas PA-C    Assistant:  None. Type of Anesthesia: 1% plain lidocaine    Procedure/Description:  US-Guided paracentesis    Findings:  Using ultrasound guidance the largest pocket of peritoneal fluid was localized and marked at the right lower quadrant. The patient was prepped and draped in the usual fashion. 1% Lidocaine was infiltrated locally. A 5 Kiswahili over the needle catheter was advanced into the peritoneal cavity and dark yellow colored fluid was aspirated. Once fluid was easily aspirated, the needle was removed leaving the catheter in place. The catheter was connected to vacuum containers and 3.05 liters of ascitic fluid was removed.     Estimated blood Loss:  Minimal    Specimen Removed:   Yes    Complications: None    Condition: Stable    Discharge Plan:  discharge home     Hernan Damian

## 2017-11-15 NOTE — H&P
OUTPATIENT HISTORY AND PHYSICAL      Today 11/15/2017     Indication/Symptoms:   Cleve Antonio is a 47 y.o. male with recurrent ascites who presents for an US-guided paracentesis. Current Meds:    Prior to Admission medications    Medication Sig Start Date End Date Taking? Authorizing Provider   LONSURF 20-8. 19 mg tab  9/27/17   Historical Provider   FREESTYLE LANCETS 28 gauge misc USE LANCET TO CHECK BLOOD GLUCOSE 4 TIMES DAILY 9/19/17   Historical Provider   HYDROcodone-acetaminophen (NORCO) 5-325 mg per tablet Take 1 Tab by mouth every six (6) hours as needed. 8/10/17   Historical Provider   insulin glargine (LANTUS,BASAGLAR) 100 unit/mL (3 mL) inpn 24 units at bedtime  Patient taking differently: 10 Units by SubCUTAneous route nightly. 24 units at bedtime 9/8/17   Landon Loja MD   insulin aspart (NOVOLOG FLEXPEN) 100 unit/mL inpn Use 8 units each meal 9/8/17   Landon Loja MD   BD INSULIN PEN NEEDLE UF SHORT 31 gauge x 5/16\" ndle Use 4 times a day 9/8/17   Landon Loja MD   VENTOLIN HFA 90 mcg/actuation inhaler inhale 2 puffs by mouth every 6 hours if needed 8/9/17   Historical Provider   zolpidem (AMBIEN) 5 mg tablet Take 1 Tab by mouth nightly as needed. 8/29/17   Historical Provider   apixaban (ELIQUIS) 5 mg tablet Take 5 mg by mouth two (2) times a day. Historical Provider   Blood-Glucose Meter monitoring kit Needs free style glucometer 8/22/17   Landon Loja MD   glucose blood VI test strips (FREESTYLE TEST) strip Check 3 times a day 4/21/17   Landon Loja MD   Lancets misc Check twice daily 4/18/17   Landon Loja MD   ferrous sulfate (IRON) 325 mg (65 mg iron) tablet Take 65 mg by mouth Daily (before breakfast). Historical Provider   acidophilus-pectin, citrus (ACIDOPHILUS PROBIOTIC) 100 million cell-10 mg cap Take  by mouth. Historical Provider   multivitamin (MEN'S MULTI-VITAMIN) tablet Take 1 Tab by mouth daily.     Historical Provider       Allergies:    No Known Allergies    Comorbid Conditions:    Past Medical History:   Diagnosis Date    Bleeding     [facility] ([facility] [address]     Diabetes ([facility] [address]     HTN (hypertension)     Liver cancer ([facility] [address]         No past surgical history on file. Data:    There were no vitals taken for this visit.:  Recent Labs      11/15/17   0815   PLT  216     Recent Labs      11/15/17   0815   INR  1.6*   APTT  33.9       The H & P and/or progress notes and any available imaging were reviewed. The risks, indications and possible alternatives to the procedure, including doing nothing, were discussed and informed consent was obtained. Physical Exam:      Mental status:   Alert and oriented. Heart:   Regular rate. Lungs:  Normal respiratory effort. The patient is an appropriate candidate to undergo the planned procedure and sedation.     Frida Myersma

## 2017-11-20 PROBLEM — C19 METASTATIC COLORECTAL CANCER (HCC): Status: ACTIVE | Noted: 2017-01-01

## 2017-11-20 PROBLEM — N17.9 ACUTE RENAL FAILURE (ARF) (HCC): Status: ACTIVE | Noted: 2017-01-01

## 2017-11-20 PROBLEM — I85.01 BLEEDING ESOPHAGEAL VARICES (HCC): Status: ACTIVE | Noted: 2017-01-01

## 2017-11-20 PROBLEM — N19 RENAL FAILURE: Status: ACTIVE | Noted: 2017-01-01

## 2017-11-20 PROBLEM — I95.9 HYPOTENSION: Status: ACTIVE | Noted: 2017-01-01

## 2017-11-20 PROBLEM — E87.5 HYPERKALEMIA: Status: ACTIVE | Noted: 2017-01-01

## 2017-11-20 PROBLEM — K92.2 GI BLEED: Status: ACTIVE | Noted: 2017-01-01

## 2017-11-20 NOTE — ED NOTES
Pharmacy notified of ordered [place]. Pharmacist Teodoro Hair states he will call when it's ready.

## 2017-11-20 NOTE — ED NOTES
Pt moved to [address] [place] [address] room 4. Report received from [facility]. Pt reconnected to monitoring devices and informed of status. Port accessed and blood transfusion initiated. Pt is awake and alert, tearful with significant other at bedside. Pt remains hypotensive.

## 2017-11-20 NOTE — ED NOTES
Patient actively vomiting dark blood approx 500cc, Dr. Chas Brian notified and at bedside, order received for emergent blood 1 unit to infuse, patient moved from bed 17 to 4, bedside report given to Kristy Godwin [facility] josh Kohli at bedside

## 2017-11-20 NOTE — ED TRIAGE NOTES
Pt arrived to the ED complaining of vomiting and pooping blood. Pt has a hx of liver cancer and colon cancer.  Pt A&Ox4, safety intact

## 2017-11-20 NOTE — ED PROVIDER NOTES
HPI Comments: Pina Dao is a 47 y.o. Male with PMHx of liver and colon cancer  who presents to the ED with c/o black bloody hematemesis and blood in stool for the past 1 week. Associated symptoms include decreased appetite, decreased PO intake, increased weight loss and BLE swelling. Reports emesis upon PO intake. Notes he has a medi-port in place. He is followed by Dr. Lucretia Contreras oncologist who diagnosed him last year. Admits Dr. Lucretia Contreras is not aware of patient's symptoms, next appointment with Dr. Lucretia Contreras is in 2 days. Reports he is no longer undergoing chemo, is taking medications for tx. Patient is followed by PCP Dr. Pam Diallo. No other symptoms or concerns were expressed. The history is provided by the patient and a caregiver. Past Medical History:   Diagnosis Date    Bleeding     [facility] ([facility]     Diabetes ([facility]     HTN (hypertension)     Liver cancer ([facility]        No past surgical history on file. Family History:   Problem Relation Age of Onset    Cancer Father      lung       Social History     Social History    Marital status: SINGLE     Spouse name: N/A    Number of children: N/A    Years of education: N/A     Occupational History    Not on file. Social History Main Topics    Smoking status: Former Smoker     Types: Cigarettes     Quit date: 3/16/2017    Smokeless tobacco: Never Used    Alcohol use No    Drug use: No    Sexual activity: Yes     Partners: Female     Other Topics Concern    Not on file     Social History Narrative         ALLERGIES: Review of patient's allergies indicates no known allergies. Review of Systems   Constitutional: Positive for appetite change (decreased) and unexpected weight change (weight loss). Negative for activity change, fatigue and fever. HENT: Negative for congestion and rhinorrhea. Eyes: Negative for visual disturbance. Respiratory: Negative for shortness of breath. Cardiovascular: Positive for leg swelling.  Negative for chest pain and palpitations. Gastrointestinal: Positive for blood in stool and vomiting. Negative for abdominal pain, diarrhea and nausea. Hematemesis   Genitourinary: Negative for dysuria and hematuria. Musculoskeletal: Negative for back pain. Skin: Negative for rash. Neurological: Negative for dizziness, weakness and light-headedness. Vitals:    11/20/17 1915 11/20/17 1920 11/20/17 1930 11/20/17 1931   BP: (!) 87/59 (!) 84/60 (!) 86/63 (!) 84/60   Pulse: 90 89 89 90   Resp: 20 13 17 14   Temp:    95.3 °F (35.2 °C)   SpO2: 100% 100% 100% 100%   Weight:                Physical Exam   Constitutional: He is oriented to person, place, and time. He appears well-developed and well-nourished. No distress. Thin    HENT:   Head: Normocephalic and atraumatic. Right Ear: External ear normal.   Left Ear: External ear normal.   Nose: Nose normal.   Mouth/Throat: Oropharynx is clear and moist.   Eyes: EOM are normal. Pupils are equal, round, and reactive to light. Scleral icterus is present. Pale    Neck: Normal range of motion. Neck supple. No JVD present. No tracheal deviation present. No thyromegaly present. Cardiovascular: Normal rate, regular rhythm, normal heart sounds and intact distal pulses. Exam reveals no gallop and no friction rub. No murmur heard. Pulmonary/Chest: Effort normal and breath sounds normal. He exhibits no tenderness. Abdominal: Bowel sounds are normal. He exhibits no distension. There is no tenderness. There is no rebound and no guarding. Mild distension with fluid wave    Genitourinary: Rectal exam shows guaiac positive stool (black stool). Musculoskeletal: He exhibits edema. He exhibits no tenderness. Lymphadenopathy:     He has no cervical adenopathy. Neurological: He is alert and oriented to person, place, and time. No cranial nerve deficit. Coordination normal.   Globally weak   Skin: Skin is dry. Cool    Psychiatric: He has a normal mood and affect. His behavior is normal. Judgment and thought content normal.   Supportive fiance at the bedside    Nursing note and vitals reviewed. MDM  Number of Diagnoses or Management Options  Acute renal failure, unspecified acute renal failure type [facility]): Anemia, unspecified type: Anticoagulant long-term use:   Hyponatremia:   Hypotension, unspecified hypotension type:   Metastatic colon cancer to liver [facility]):   Secondary esophageal varices with bleeding [facility]):   Diagnosis management comments: Pt is a 55yo male with metastatic colon cancer currently on treatment, DM, HTN presents with one week of dark stools and today with hematemesis. Pt appears end stage and with this bleeding have concerns for variceal bleeding. Pt BP is low and will hydrate, start protonix, trend his h/h, INR, octreotide then reevaluate.  Cade Vasquez DO 5:49 PM      Critical Care  Total time providing critical care: 30-74 minutes (60 minutes)    ED Course       Procedures    Vitals:  Patient Vitals for the past 12 hrs:   Temp Pulse Resp BP SpO2   11/20/17 1931 95.3 °F (35.2 °C) 90 14 (!) 84/60 100 %   11/20/17 1930 - 89 17 (!) 86/63 100 %   11/20/17 1920 - 89 13 (!) 84/60 100 %   11/20/17 1915 - 90 20 (!) 87/59 100 %   11/20/17 1900 - 94 17 103/78 100 %   11/20/17 1845 - 89 12 96/61 100 %   11/20/17 1830 - 87 15 90/64 100 %   11/20/17 1815 - 90 13 90/62 100 %   11/20/17 1745 - - - (!) 76/57 -   11/20/17 1735 95 °F (35 °C) 92 14 (!) 83/56 97 %   11/20/17 1730 - - - (!) 78/54 100 %   11/20/17 1715 - - - (!) 83/55 -       Medications ordered:   Medications   0.9% sodium chloride infusion (125 mL/hr IntraVENous New Bag 11/20/17 1926)   octreotide (SANDOSTATIN) 500 mcg in 0.9% sodium chloride 500 mL infusion (50 mcg/hr IntraVENous New Bag 11/20/17 1800)   0.9% sodium chloride infusion 250 mL (not administered)   0.9% sodium chloride infusion 250 mL (not administered)   pantoprazole (PROTONIX) 80 mg in 0.9% sodium chloride 100 mL infusion (8 mg/hr IntraVENous New Bag 11/20/17 1926)   calcium gluconate injection 1 g (not administered)   pantoprazole (PROTONIX) injection 40 mg (40 mg IntraVENous Given 11/20/17 1759)   octreotide (SANDOSTATIN) injection 50 mcg (50 mcg IntraVENous Given 11/20/17 1800)   ondansetron (ZOFRAN) 4 mg/2 mL injection (4 mg  Given 11/20/17 1806)   human prothrombin complex [facility]) 4-factor (KCENTRA) injection 3,500 Int'l Units (0 Int'l Units IntraVENous IV Completed 11/20/17 1941)   sodium bicarbonate 8.4 % (1 mEq/mL) injection 50 mEq (50 mEq IntraVENous Given 11/20/17 2008)   insulin regular (NOVOLIN R, HUMULIN R) injection 4 Units (4 Units IntraVENous Given 11/20/17 2007)   dextrose (D50W) injection syrg 25 g (25 g IntraVENous Given 11/20/17 2008)         Lab findings:  Recent Results (from the past 12 hour(s))   CBC WITH AUTOMATED DIFF    Collection Time: 11/20/17  5:43 PM   Result Value Ref Range    WBC 13.7 (H) 4.6 - 13.2 K/uL    RBC 2.55 (L) 4.70 - 5.50 M/uL    HGB 8.5 (L) 13.0 - 16.0 g/dL    HCT 24.5 (L) 36.0 - 48.0 %    MCV 96.1 74.0 - 97.0 FL    MCH 33.3 24.0 - 34.0 PG    MCHC 34.7 31.0 - 37.0 g/dL    RDW 18.5 (H) 11.6 - 14.5 %    PLATELET 340 722 - 203 K/uL    MPV 10.9 9.2 - 11.8 FL    NEUTROPHILS 90 (H) 40 - 73 %    LYMPHOCYTES 7 (L) 21 - 52 %    MONOCYTES 3 3 - 10 %    EOSINOPHILS 0 0 - 5 %    BASOPHILS 0 0 - 2 %    ABS. NEUTROPHILS 12.3 (H) 1.8 - 8.0 K/UL    ABS. LYMPHOCYTES 1.0 0.9 - 3.6 K/UL    ABS. MONOCYTES 0.4 0.05 - 1.2 K/UL    ABS. EOSINOPHILS 0.0 0.0 - 0.4 K/UL    ABS.  BASOPHILS 0.0 0.0 - 0.1 K/UL    DF AUTOMATED     PROTHROMBIN TIME + INR    Collection Time: 11/20/17  5:43 PM   Result Value Ref Range    Prothrombin time 31.1 (H) 11.5 - 15.2 sec    INR 3.1 (H) 0.8 - 1.2     PTT    Collection Time: 11/20/17  5:43 PM   Result Value Ref Range    aPTT 49.4 (H) 23.0 - 36.4 SEC   EMERGENT RELEASE OF UNCROSSMATCHED RED CELLS    Collection Time: 11/20/17  5:43 PM   Result Value Ref Range    Crossmatch Expiration 11/23/2017     ABO/Rh(D) A POSITIVE     Antibody screen NEG     CALLED TO: LATISHA COLVIN, AT 18:46 ON 11/20/2017 BY [facility]     Unit number [id number]     Blood component type RC LR AS1     Unit division 00     Status of unit TRANSFUSED     Crossmatch result Compatible     Unit number [id number]     Blood component type RC LR AS1     Unit division 00     Status of unit ISSUED     Crossmatch result Compatible     Unit number [id number]     Blood component type RC LR AS1     Unit division 00     Status of unit ALLOCATED     Crossmatch result Compatible    EKG, 12 LEAD, INITIAL    Collection Time: 11/20/17  6:47 PM   Result Value Ref Range    Ventricular Rate 89 BPM    Atrial Rate 89 BPM    P-R Interval 184 ms    QRS Duration 98 ms    Q-T Interval 392 ms    QTC Calculation (Bezet) 476 ms    Calculated P Axis 47 degrees    Calculated R Axis 24 degrees    Calculated T Axis 52 degrees    Diagnosis       Normal sinus rhythm  Septal infarct , age undetermined  Abnormal ECG  When compared with ECG of 19-JUL-2017 00:49,  Septal infarct is now present     LIPASE    Collection Time: 11/20/17  7:00 PM   Result Value Ref Range    Lipase 159 73 - 393 U/L   MAGNESIUM    Collection Time: 11/20/17  7:00 PM   Result Value Ref Range    Magnesium 3.5 (H) 1.6 - 2.6 mg/dL   METABOLIC PANEL, COMPREHENSIVE    Collection Time: 11/20/17  7:00 PM   Result Value Ref Range    Sodium 124 (L) 136 - 145 mmol/L    Potassium 6.8 (HH) 3.5 - 5.5 mmol/L    Chloride 93 (L) 100 - 108 mmol/L    CO2 10 (L) 21 - 32 mmol/L    Anion gap 21 (H) 3.0 - 18 mmol/L    Glucose 129 (H) 74 - 99 mg/dL    BUN >150 (H) 7.0 - 18 MG/DL    Creatinine 7.39 (H) 0.6 - 1.3 MG/DL    BUN/Creatinine ratio Cannot be calculated 12 - 20      GFR est AA 9 (L) >60 ml/min/1.73m2    GFR est non-AA 8 (L) >60 ml/min/1.73m2    Calcium 8.7 8.5 - 10.1 MG/DL    Bilirubin, total 11.4 (H) 0.2 - 1.0 MG/DL    ALT (SGPT) 122 (H) 16 - 61 U/L    AST (SGOT) 286 (H) 15 - 37 U/L    Alk.  phosphatase PENDING U/L    Protein, total 6.4 6.4 - 8.2 g/dL    Albumin 1.4 (L) 3.4 - 5.0 g/dL    Globulin 5.0 (H) 2.0 - 4.0 g/dL    A-G Ratio 0.3 (L) 0.8 - 1.7         EKG interpretation by ED Physician:  18:47 NSR @ 80 bpm. No STEMI. X-Ray, CT or other radiology findings or impressions:  XR CHEST SNGL V    (Results Pending)   CXR:  Chronic elevated R hemidiaphragm. No infiltrate or effusion. Progress notes, Consult notes or additional Procedure notes:   6:11 PM: Patient had a large episode of hematemesis with low BP. Emesis of 750 ml of dark red blood. Patient is alert, wife at bedside, discussed advanced directives, would like everything done. Started titrated bolus, will administer 1 unit of blood. Concern for hemorrhage. Consult:  Discussed care with Dr. Jeremiah Dunn, oncologist. Standard discussion; including history of patients chief complaint, available diagnostic results, and treatment course. States patient has not been improving on chemotherapy for the past year. In the last 6 months, patient has been unable to walk, has been declining. Patient and fiance are resistant to any palliative care or comfort measures provided by [facility]. Every time Dr. Jeremiah Dunn addresses terminal illness, they get upset, will not talk to her. At this point, recommends transfusing patient and treating him. States this is a profoundly terminal illness. Also, patient is on Eliquis for VTE and requires reversal given aggressive bleeding. 6:24 PM, 11/20/2017     Consult:  Discussed care with MARLON Jacobo. Standard discussion; including history of patients chief complaint, available diagnostic results, and treatment course. Recommends patient be transfused. Advised patient be started on Sandostatin. States if patient has another episode, will consider endoscopy tonight. 6:30 PM, 11/20/2017     7:39 PM Consult: I discussed care with MARLON Jacobo.   It was a standard discussion including patient history, chief complaint, available diagnostic results, and predicted treatment course. Dr Patricia Piña asked that patient get admitted to ICU and will follow patient there. Requests to placed on treatment team.    7:58 PM Consult: I discussed care with Dr. Zhane Chopra. It was a standard discussion including patient history, chief complaint, available diagnostic results, and predicted treatment course. Dr. Rambo Ames will except patient in ICU.    8:03 PM: Patient is being evaluated by Dr. Zhane Chopra. Patient does have progressive renal failure. Consult:  Discussed care with Dr. Guerline Brock, nephrologist. Standard discussion; including history of patients chief complaint, available diagnostic results, and treatment course. Recommends IV medications to lower potassium, will see patient tomorrow. States patient is not a candidate for dialysis until he stabilizes. Requests to be placed on treatment team.  8:14 PM, 11/20/2017     Consult:  Discussed care with Dr. Sangeeta Yeager, hospitalist. Standard discussion; including history of patients chief complaint, available diagnostic results, and treatment course. Accepts admit. 8:19 PM, 11/20/2017       Disposition:  Diagnosis:   1. Metastatic colon cancer to liver ([facility] [address]    2. Secondary esophageal varices with bleeding ([facility])    3. Hypotension, unspecified hypotension type    4. Anemia, unspecified type    5. Anticoagulant long-term use        Disposition: Admit. Follow-up Information     None           Patient's Medications   Start Taking    No medications on file   Continue Taking    ACIDOPHILUS-PECTIN, CITRUS (ACIDOPHILUS PROBIOTIC) 100 MILLION CELL-10 MG CAP    Take  by mouth. APIXABAN (ELIQUIS) 5 MG TABLET    Take 5 mg by mouth two (2) times a day.     BD INSULIN PEN NEEDLE UF SHORT 31 GAUGE X 5/16\" NDLE    Use 4 times a day    BLOOD-GLUCOSE METER MONITORING KIT    Needs free style glucometer    FERROUS SULFATE (IRON) 325 MG (65 MG IRON) TABLET    Take 65 mg by mouth Daily (before breakfast). FREESTYLE LANCETS 28 GAUGE MISC    USE LANCET TO CHECK BLOOD GLUCOSE 4 TIMES DAILY    GLUCOSE BLOOD VI TEST STRIPS (FREESTYLE TEST) STRIP    Check 3 times a day    HYDROCODONE-ACETAMINOPHEN (NORCO) 5-325 MG PER TABLET    Take 1 Tab by mouth every six (6) hours as needed. INSULIN ASPART (NOVOLOG FLEXPEN) 100 UNIT/ML INPN    Use 8 units each meal    INSULIN GLARGINE (LANTUS,BASAGLAR) 100 UNIT/ML (3 ML) INPN    24 units at bedtime    LANCETS MISC    Check twice daily    LONSURF 20-8. 19 MG TAB        MULTIVITAMIN (MEN'S MULTI-VITAMIN) TABLET    Take 1 Tab by mouth daily. VENTOLIN HFA 90 MCG/ACTUATION INHALER    inhale 2 puffs by mouth every 6 hours if needed    ZOLPIDEM (AMBIEN) 5 MG TABLET    Take 1 Tab by mouth nightly as needed. These Medications have changed    No medications on file   Stop Taking    No medications on file         Scribe Attestation     Sean Perez acting as a scribe for and in the presence of Cristin Simpson MD      November 20, 2017 at 5:24 PM       Provider Attestation:      I personally performed the services described in the documentation, reviewed the documentation, as recorded by the scribe in my presence, and it accurately and completely records my words and actions.  November 20, 2017 at 5:24 PM - Cristin Simpson MD

## 2017-11-20 NOTE — Clinical Note
Status[de-identified] Inpatient [101] Type of Bed: Intensive Care [6] Inpatient Hospitalization Certified Necessary for the Following Reasons: 4. Patient requires ICU level of care interventions (further clarification in H&P documentation) Admitting Diagnosis: Acute renal failure (ARF) ([facility] [address] [address] [[id number]] Admitting Diagnosis: Bleeding [facility]ices ([facility] [[id number]] Admitting Diagnosis: Hyperkalemia [888708] Admitting Diagnosis: Hypotension [892800] Admitting Physician: Patti Moreno [[id number]] Attending Physician: Patti Moreno [[id number]] Estimated Length of Stay: 2 Midnights Discharge Plan[de-identified] Extended Care Facility (e.g. Adult Home, Nursing Home, etc.)

## 2017-11-21 PROBLEM — Z71.89 ADVANCED CARE PLANNING/COUNSELING DISCUSSION: Status: ACTIVE | Noted: 2017-01-01

## 2017-11-21 NOTE — PROGRESS NOTES
[facility]  Pulmonary, Critical Care, and Sleep Medicine    Name: Gillian Vicente MRN: [id number]   : 1965 Hospital: [address] [place] [address] Dr   Date: 2017        Critical Care Progress Note      IMPRESSION:   Hypotension secondary to acute blood loss- GI bleeding in setting of metastatic Colon ca and anticoagulation   Ascites- malignant  Acute renal failure with hyperkalemia  Acute encephalopathy, likely uremic  Pulmonary embolism, other (HCC)/ post IVC/ on eliquis   Colorectal ca with mets to liver     Patient Active Problem List   Diagnosis Code    Malignant neoplasm of colon (HCC)/ metastasis to liver. following Dr. Reyes Levans  C18.9    Pulmonary embolus (HCC)/ post rt leg DVT/. post IVC filter . on eliquis  I26.99    Type 2 diabetes mellitus with hyperglycemia, with long-term current use of insulin (HCC) E11.65, Z79.4    Bleeding esophageal varices (HCC) I85.01    Hyperkalemia E87.5    Hypotension I95.9    Acute renal failure (ARF) (HCC) N17.9    Renal failure N19    GI bleed K92.2    Metastatic colorectal cancer ([facility] [address] C78.5        RECOMMENDATIONS:   · Continue with IVF hydration -changed to bicarb gtt per nephrology service. Transfuse as needed for Hgb > 7. Consider vasopressor support if refractory to IVF/ blood products  · Stable on room air  · Hold anticoagulation  · Monitor temperature curve, leukocytosis - if with signs of infection, pan-culture and consider antibiotics as appropriate  · Trend hyperkalemia -not a candidate for hemodialysis given terminal illness  · Nephrology consulted- not a candidate for acute hemodialysis due to hypotension and advanced metastatic colon ca  · PPI - protonix drip, octeotride drip. GI consulted  · Glycemic control  · Best supportive treatment  · Appreciate Palliative care team who discussed with Renée Espino (girlfriend, awaiting MPOA documentation from Dr. Sharron Amaya office) the current status and poor prognosis.    · Will follow from [address] [place] [address] standpoint     Subjective/History:     46 y/o male with hx of metastatic colorectal ca, presented to the ED for acute GI bleed, hypotension and Acute renal failure. 11/21/17  No new events. Patient's BP stable, on IVF hydration. Pt more awake this morning, intermittently confused however still able to answer some questions although not consistent. No active bleeding noted. Hyperkalemia addressed with insulin, bicarb. Past Medical History:   Diagnosis Date    Bleeding     Colon cancer ([facility] [address]     Diabetes ([facility] [address]     HTN (hypertension)     Liver cancer ([facility] [address]       History reviewed. No pertinent surgical history. Prior to Admission medications    Medication Sig Start Date End Date Taking? Authorizing Provider   LONSURF 20-8. 19 mg tab  9/27/17   Historical Provider   FREESTYLE LANCETS 28 gauge misc USE LANCET TO CHECK BLOOD GLUCOSE 4 TIMES DAILY 9/19/17   Historical Provider   HYDROcodone-acetaminophen (NORCO) 5-325 mg per tablet Take 1 Tab by mouth every six (6) hours as needed. 8/10/17   Historical Provider   insulin glargine (LANTUS,BASAGLAR) 100 unit/mL (3 mL) inpn 24 units at bedtime  Patient taking differently: 10 Units by SubCUTAneous route nightly. 24 units at bedtime 9/8/17   Landon Loja MD   insulin aspart (NOVOLOG FLEXPEN) 100 unit/mL inpn Use 8 units each meal 9/8/17   Landon Loja MD   BD INSULIN PEN NEEDLE UF SHORT 31 gauge x 5/16\" ndle Use 4 times a day 9/8/17   Landon Loja MD   VENTOLIN HFA 90 mcg/actuation inhaler inhale 2 puffs by mouth every 6 hours if needed 8/9/17   Historical Provider   zolpidem (AMBIEN) 5 mg tablet Take 1 Tab by mouth nightly as needed. 8/29/17   Historical Provider   apixaban (ELIQUIS) 5 mg tablet Take 5 mg by mouth two (2) times a day.     Historical Provider   Blood-Glucose Meter monitoring kit Needs free style glucometer 8/22/17   Landon Loja MD   glucose blood VI test strips (FREESTYLE TEST) strip Check 3 times a day 4/21/17   Landon Loja MD Lancets misc Check twice daily 17   Glo Stroud MD   ferrous sulfate (IRON) 325 mg (65 mg iron) tablet Take 65 mg by mouth Daily (before breakfast). Historical Provider   acidophilus-pectin, citrus (ACIDOPHILUS PROBIOTIC) 100 million cell-10 mg cap Take  by mouth. Historical Provider   multivitamin (MEN'S MULTI-VITAMIN) tablet Take 1 Tab by mouth daily. Historical Provider     Current Facility-Administered Medications   Medication Dose Route Frequency    NOREPINephrine (LEVOPHED) 8 mg in 5% dextrose 250mL infusion  2-16 mcg/min IntraVENous TITRATE    sodium bicarbonate (8.4%) 100 mEq in dextrose 5% 1,000 mL infusion   IntraVENous CONTINUOUS    octreotide (SANDOSTATIN) 500 mcg in 0.9% sodium chloride 500 mL infusion  50 mcg/hr IntraVENous CONTINUOUS    pantoprazole (PROTONIX) 80 mg in 0.9% sodium chloride 100 mL infusion  8 mg/hr IntraVENous CONTINUOUS    insulin lispro (HUMALOG) injection   SubCUTAneous AC&HS    sodium chloride (NS) flush 5-10 mL  5-10 mL IntraVENous Q8H    docusate sodium (COLACE) capsule 100 mg  100 mg Oral BID    sodium chloride (NS) flush 5-10 mL  5-10 mL IntraVENous Q8H     No Known Allergies   Social History   Substance Use Topics    Smoking status: Former Smoker     Types: Cigarettes     Quit date: 3/16/2017    Smokeless tobacco: Never Used    Alcohol use No      Family History   Problem Relation Age of Onset    Cancer Father      lung        Review of Systems:  Review of systems not obtained due to patient factors.     Objective:   Vital Signs:    Visit Vitals    BP (!) 86/61    Pulse 95    Temp 97.8 °F (36.6 °C)    Resp 10    Ht 5' 7\" (1.702 m)    Wt 69.2 kg (152 lb 9.6 oz)    SpO2 100%    BMI 23.9 kg/m2       O2 Device: Room air       Temp (24hrs), Av.6 °F (35.9 °C), Min:95 °F (35 °C), Max:97.8 °F (36.6 °C)       Intake/Output:   Last shift:         Last 3 shifts:  1901 -  0700  In: -   Out: 500     Intake/Output Summary (Last 24 hours) at 11/21/17 1430  Last data filed at 11/20/17 1800   Gross per 24 hour   Intake                0 ml   Output              500 ml   Net             -500 ml     . Physical Exam:     General:  Easily awakened, cooperative, no distress. Cachectic. Head:  Normocephalic, + temporal wasting   Eyes:  Pink palpebral conjunctivae, icteric sclerae; EOMs intact   Nose: Nares normal. Mucosa dry No drainage   Throat: Lips, mucosa, and tongue dry   Neck: Supple, symmetrical, trachea midline, no adenopathy, thyroid: no enlargment/tenderness/nodules, no carotid bruit and no JVD. Lungs:   Bibasilar rales R>L   Chest wall:  No tenderness or deformity. Mediport right infraclavicular   Heart:  Regular rate and rhythm, S1, S2 normal   Abdomen:   Soft, distended, non-tender. + hepatomegaly   Extremities: Extremities normal, atraumatic, no cyanosis , +2 edema. Pulses: 2+ and symmetric all extremities. Skin: Skin color, texture, decreased, No rashes or lesions   Neurologic: Grossly nonfocal       Data:     Recent Results (from the past 24 hour(s))   CBC WITH AUTOMATED DIFF    Collection Time: 11/20/17  5:43 PM   Result Value Ref Range    WBC 13.7 (H) 4.6 - 13.2 K/uL    RBC 2.55 (L) 4.70 - 5.50 M/uL    HGB 8.5 (L) 13.0 - 16.0 g/dL    HCT 24.5 (L) 36.0 - 48.0 %    MCV 96.1 74.0 - 97.0 FL    MCH 33.3 24.0 - 34.0 PG    MCHC 34.7 31.0 - 37.0 g/dL    RDW 18.5 (H) 11.6 - 14.5 %    PLATELET 965 780 - 839 K/uL    MPV 10.9 9.2 - 11.8 FL    NEUTROPHILS 90 (H) 40 - 73 %    LYMPHOCYTES 7 (L) 21 - 52 %    MONOCYTES 3 3 - 10 %    EOSINOPHILS 0 0 - 5 %    BASOPHILS 0 0 - 2 %    ABS. NEUTROPHILS 12.3 (H) 1.8 - 8.0 K/UL    ABS. LYMPHOCYTES 1.0 0.9 - 3.6 K/UL    ABS. MONOCYTES 0.4 0.05 - 1.2 K/UL    ABS. EOSINOPHILS 0.0 0.0 - 0.4 K/UL    ABS.  BASOPHILS 0.0 0.0 - 0.1 K/UL    DF AUTOMATED     PROTHROMBIN TIME + INR    Collection Time: 11/20/17  5:43 PM   Result Value Ref Range    Prothrombin time 31.1 (H) 11.5 - 15.2 sec    INR 3.1 (H) 0.8 - 1.2 PTT    Collection Time: 11/20/17  5:43 PM   Result Value Ref Range    aPTT 49.4 (H) 23.0 - 36.4 SEC   EMERGENT RELEASE OF UNCROSSMATCHED RED CELLS    Collection Time: 11/20/17  5:43 PM   Result Value Ref Range    Crossmatch Expiration 11/23/2017     ABO/Rh(D) A POSITIVE     Antibody screen NEG     CALLED TO: LATISHA COLVIN, AT 18:46 ON 11/20/2017 BY [facility]     Unit number [id number]     Blood component type RC LR AS1     Unit division 00     Status of unit TRANSFUSED     Crossmatch result Compatible     Unit number [id number]     Blood component type RC LR AS1     Unit division 00     Status of unit TRANSFUSED     Crossmatch result Compatible     Unit number [id number]     Blood component type RC LR AS1     Unit division 00     Status of unit ALLOCATED     Crossmatch result Compatible    HEMOGLOBIN A1C WITH EAG    Collection Time: 11/20/17  5:43 PM   Result Value Ref Range    Hemoglobin A1c 4.7 4.2 - 5.6 %    Est. average glucose Cannot be calculated mg/dL   EKG, 12 LEAD, INITIAL    Collection Time: 11/20/17  6:47 PM   Result Value Ref Range    Ventricular Rate 89 BPM    Atrial Rate 89 BPM    P-R Interval 184 ms    QRS Duration 98 ms    Q-T Interval 392 ms    QTC Calculation (Bezet) 476 ms    Calculated P Axis 47 degrees    Calculated R Axis 24 degrees    Calculated T Axis 52 degrees    Diagnosis       Normal sinus rhythm  Septal infarct , age undetermined  Abnormal ECG  When compared with ECG of 19-JUL-2017 00:49,  Septal infarct is now present  Confirmed by Renate Warren MD, Braulio Alvarenga ([id number]) on 11/21/2017 8:18:58 AM     LIPASE    Collection Time: 11/20/17  7:00 PM   Result Value Ref Range    Lipase 159 73 - 393 U/L   MAGNESIUM    Collection Time: 11/20/17  7:00 PM   Result Value Ref Range    Magnesium 3.5 (H) 1.6 - 2.6 mg/dL   METABOLIC PANEL, COMPREHENSIVE    Collection Time: 11/20/17  7:00 PM   Result Value Ref Range    Sodium 124 (L) 136 - 145 mmol/L    Potassium 6.8 (HH) 3.5 - 5.5 mmol/L    Chloride 93 (L) 100 - 108 mmol/L    CO2 10 (L) 21 - 32 mmol/L    Anion gap 21 (H) 3.0 - 18 mmol/L    Glucose 129 (H) 74 - 99 mg/dL    BUN >150 (H) 7.0 - 18 MG/DL    Creatinine 7.39 (H) 0.6 - 1.3 MG/DL    BUN/Creatinine ratio Cannot be calculated 12 - 20      GFR est AA 9 (L) >60 ml/min/1.73m2    GFR est non-AA 8 (L) >60 ml/min/1.73m2    Calcium 8.7 8.5 - 10.1 MG/DL    Bilirubin, total 11.4 (H) 0.2 - 1.0 MG/DL    ALT (SGPT) 122 (H) 16 - 61 U/L    AST (SGOT) 286 (H) 15 - 37 U/L    Alk. phosphatase 1182 (H) 45 - 117 U/L    Protein, total 6.4 6.4 - 8.2 g/dL    Albumin 1.4 (L) 3.4 - 5.0 g/dL    Globulin 5.0 (H) 2.0 - 4.0 g/dL    A-G Ratio 0.3 (L) 0.8 - 1.7     CBC WITH AUTOMATED DIFF    Collection Time: 11/21/17 12:53 AM   Result Value Ref Range    WBC 14.8 (H) 4.6 - 13.2 K/uL    RBC 3.50 (L) 4.70 - 5.50 M/uL    HGB 11.1 (L) 13.0 - 16.0 g/dL    HCT 31.4 (L) 36.0 - 48.0 %    MCV 89.7 74.0 - 97.0 FL    MCH 31.7 24.0 - 34.0 PG    MCHC 35.4 31.0 - 37.0 g/dL    RDW 18.9 (H) 11.6 - 14.5 %    PLATELET 661 (L) 066 - 420 K/uL    MPV 10.9 9.2 - 11.8 FL    NEUTROPHILS 83 (H) 42 - 75 %    BAND NEUTROPHILS 5 0 - 5 %    LYMPHOCYTES 6 (L) 20 - 51 %    MONOCYTES 5 2 - 9 %    EOSINOPHILS 0 0 - 5 %    BASOPHILS 1 0 - 3 %    NRBC 1.0 (H) 0  WBC    ABS. NEUTROPHILS 13.1 (H) 1.8 - 8.0 K/UL    ABS. LYMPHOCYTES 0.9 0.8 - 3.5 K/UL    ABS. MONOCYTES 0.7 0 - 1.0 K/UL    ABS. EOSINOPHILS 0.0 0.0 - 0.4 K/UL    ABS.  BASOPHILS 0.1 (H) 0.0 - 0.06 K/UL    DF MANUAL      PLATELET COMMENTS ADEQUATE PLATELETS      RBC COMMENTS ANISOCYTOSIS  2+        RBC COMMENTS TARGET CELLS  1+        RBC COMMENTS Dimorphic RBCs  1+       GLUCOSE, POC    Collection Time: 11/21/17  3:34 AM   Result Value Ref Range    Glucose (POC) 139 (H) 70 - 152 mg/dL   METABOLIC PANEL, COMPREHENSIVE    Collection Time: 11/21/17  3:38 AM   Result Value Ref Range    Sodium 129 (L) 136 - 145 mmol/L    Potassium 6.1 (HH) 3.5 - 5.5 mmol/L    Chloride 99 (L) 100 - 108 mmol/L    CO2 12 (L) 21 - 32 mmol/L Anion gap 18 3.0 - 18 mmol/L    Glucose 128 (H) 74 - 99 mg/dL     (H) 7.0 - 18 MG/DL    Creatinine 6.69 (H) 0.6 - 1.3 MG/DL    BUN/Creatinine ratio 28 (H) 12 - 20      GFR est AA 11 (L) >60 ml/min/1.73m2    GFR est non-AA 9 (L) >60 ml/min/1.73m2    Calcium 8.2 (L) 8.5 - 10.1 MG/DL    Bilirubin, total 11.0 (H) 0.2 - 1.0 MG/DL    ALT (SGPT) 105 (H) 16 - 61 U/L    AST (SGOT) 233 (H) 15 - 37 U/L    Alk. phosphatase 959 (H) 45 - 117 U/L    Protein, total 5.4 (L) 6.4 - 8.2 g/dL    Albumin 1.2 (L) 3.4 - 5.0 g/dL    Globulin 4.2 (H) 2.0 - 4.0 g/dL    A-G Ratio 0.3 (L) 0.8 - 1.7     PROTHROMBIN TIME + INR    Collection Time: 11/21/17  3:38 AM   Result Value Ref Range    Prothrombin time 17.1 (H) 11.5 - 15.2 sec    INR 1.5 (H) 0.8 - 1.2     AMMONIA    Collection Time: 11/21/17  3:38 AM   Result Value Ref Range    Ammonia 52 (H) 11 - 32 UMOL/L   CBC WITH AUTOMATED DIFF    Collection Time: 11/21/17  3:38 AM   Result Value Ref Range    WBC 14.1 (H) 4.6 - 13.2 K/uL    RBC 3.35 (L) 4.70 - 5.50 M/uL    HGB 10.9 (L) 13.0 - 16.0 g/dL    HCT 30.1 (L) 36.0 - 48.0 %    MCV 89.9 74.0 - 97.0 FL    MCH 32.5 24.0 - 34.0 PG    MCHC 36.2 31.0 - 37.0 g/dL    RDW 19.2 (H) 11.6 - 14.5 %    PLATELET 498 (L) 228 - 420 K/uL    MPV 11.5 9.2 - 11.8 FL    NEUTROPHILS 91 (H) 40 - 73 %    LYMPHOCYTES 6 (L) 21 - 52 %    MONOCYTES 3 3 - 10 %    EOSINOPHILS 0 0 - 5 %    BASOPHILS 0 0 - 2 %    ABS. NEUTROPHILS 12.8 (H) 1.8 - 8.0 K/UL    ABS. LYMPHOCYTES 0.9 0.9 - 3.6 K/UL    ABS. MONOCYTES 0.4 0.05 - 1.2 K/UL    ABS. EOSINOPHILS 0.0 0.0 - 0.4 K/UL    ABS.  BASOPHILS 0.0 0.0 - 0.1 K/UL    DF AUTOMATED     GLUCOSE, POC    Collection Time: 11/21/17  6:20 AM   Result Value Ref Range    Glucose (POC) 135 (H) 70 - 860 mg/dL   METABOLIC PANEL, BASIC    Collection Time: 11/21/17 10:17 AM   Result Value Ref Range    Sodium 129 (L) 136 - 145 mmol/L    Potassium 5.6 (H) 3.5 - 5.5 mmol/L    Chloride 99 (L) 100 - 108 mmol/L    CO2 12 (L) 21 - 32 mmol/L    Anion gap 18 3.0 - 18 mmol/L    Glucose 143 (H) 74 - 99 mg/dL     (H) 7.0 - 18 MG/DL    Creatinine 6.76 (H) 0.6 - 1.3 MG/DL    BUN/Creatinine ratio 27 (H) 12 - 20      GFR est AA 11 (L) >60 ml/min/1.73m2    GFR est non-AA 9 (L) >60 ml/min/1.73m2    Calcium 8.1 (L) 8.5 - 10.1 MG/DL   HGB & HCT    Collection Time: 11/21/17 12:00 PM   Result Value Ref Range    HGB 10.5 (L) 13.0 - 16.0 g/dL    HCT 30.4 (L) 36.0 - 48.0 %   GLUCOSE, POC    Collection Time: 11/21/17 12:19 PM   Result Value Ref Range    Glucose (POC) 98 70 - 110 mg/dL   EKG, 12 LEAD, SUBSEQUENT    Collection Time: 11/21/17  1:49 PM   Result Value Ref Range    Ventricular Rate 101 BPM    Atrial Rate 101 BPM    P-R Interval 168 ms    QRS Duration 78 ms    Q-T Interval 352 ms    QTC Calculation (Bezet) 456 ms    Calculated P Axis 47 degrees    Calculated R Axis 32 degrees    Calculated T Axis 51 degrees    Diagnosis       Sinus tachycardia  Low voltage QRS  Nonspecific T wave abnormality  Abnormal ECG  When compared with ECG of 20-NOV-2017 18:47,  Criteria for Septal infarct are no longer present             No results for input(s): FIO2I, IFO2, HCO3I, IHCO3, HCOPOC, PCO2I, PCOPOC, IPHI, PHI, PHPOC, PO2I, PO2POC in the last 72 hours. No lab exists for component: IPOC2  Telemetry:normal sinus rhythm    Imaging:  I have personally reviewed the patients radiographs and have reviewed the reports:  Ct scan chest/abd- 10/2017  1. New multiple lung nodules with apparent interval increase in the size of the  right upper lobe nodule noted on 7/8/17.     2. Increased number and size of the liver mass compared to 7/8/17. There are  increased calcifications in the liver. Significance of this finding is unclear.     3. Increased ascites.     4. Increased fluid in the left inguinal canal, probably indicative of inguinal  hernia with increased fluid reflecting increased ascites.   CXR Results      11/20/2017  Basal steaky infiltrates         Best practice :    Glycemic control  [facility] ICU bundles:  Sress ulcer prophylaxis  DVT prophylaxis  Need for Lines, zuniga assessed  Palliative care consult- needed       Central Line Bundle Followed   - gracy Chang PA-C

## 2017-11-21 NOTE — ED NOTES
Patient transferred to hospital stretcher for increased comfort, patient reports pain in both shoulders with deep breath.

## 2017-11-21 NOTE — PROGRESS NOTES
attended the interdisciplinary rounds for Jaun Hussein, who is a 47 y.o.,male. Patients Primary Language is: [place]. According to the patients EMR Shinto Affiliation is: [facility].     The reason the Patient came to the hospital is:   Patient Active Problem List    Diagnosis Date Noted    Bleeding esophageal varices ([facility] [address] 11/20/2017    Hyperkalemia 11/20/2017    Hypotension 11/20/2017    Acute renal failure (ARF) ([facility] [address] 11/20/2017    Renal failure 11/20/2017    GI bleed 11/20/2017    Metastatic colorectal cancer ([facility] [address] 11/20/2017    Type 2 diabetes mellitus with hyperglycemia, with long-term current use of insulin ([facility] [address] 04/18/2017    Malignant neoplasm of colon (HCC)/ metastasis to liver. following Dr. Svetlana Sood  03/28/2017    Pulmonary embolus (HCC)/ post rt leg DVT/. post IVC filter . on eliquis  03/28/2017      Plan:  Fred Sandoval will continue to follow and will provide pastoral care on an as needed/requested basis.  recommends bedside caregivers page  on duty if patient shows signs of acute spiritual or emotional distress.     [address] [facility] [employer]  Board Certified [address] [facility]   [phone]

## 2017-11-21 NOTE — CONSULTS
Consult Note  Consult requested by: dr Gio Simmons is a 47 y.o. male [address] who is being seen on consult for arf  Chief Complaint   Patient presents with    Vomiting Blood     Admission diagnosis: Hypotension     HPI: 47 y o  [place] male with hx of metastatic colon cancer,mets to liver,lungs,hx of liver failure,varices,pulm emboli admitted with gi bleed,hypotension. asked to evaluate for arf and hyperkalemia. had received blood transfusions,vasopressors ,iv bicarb,d50 and insulin. pt describes edema for several month,has reccurent malignant ascites requiring paracentesis  Past Medical History:   Diagnosis Date    Bleeding     [facility] ([facility]     Diabetes ([facility]     HTN (hypertension)     [facility]       History reviewed. No pertinent surgical history. Social History     Social History    Marital status: SINGLE     Spouse name: N/A    Number of children: N/A    Years of education: N/A     Occupational History    Not on file. Social History Main Topics    Smoking status: Former Smoker     Types: Cigarettes     Quit date: 3/16/2017    Smokeless tobacco: Never Used    Alcohol use No    Drug use: No    Sexual activity: Yes     Partners: Female     Other Topics Concern    Not on file     Social History Narrative       Family History   Problem Relation Age of Onset    Cancer Father      lung     No Known Allergies     Home Medications:     Prior to Admission Medications   Prescriptions Last Dose Informant Patient Reported? Taking?    BD INSULIN PEN NEEDLE UF SHORT 31 gauge x 5/16\" ndle Not Taking at Unknown time  No No   Sig: Use 4 times a day   Blood-Glucose Meter monitoring kit 11/19/2017  No No   Sig: Needs free style glucometer   FREESTYLE LANCETS 28 gauge misc 11/19/2017  Yes No   Sig: USE LANCET TO CHECK BLOOD GLUCOSE 4 TIMES DAILY   HYDROcodone-acetaminophen (NORCO) 5-325 mg per tablet 11/20/2017  Yes No   Sig: Take 1 Tab by mouth every six (6) hours as needed. LONSURF 20-8. 19 mg tab 2017  Yes No   Lancets misc 2017  No No   Sig: Check twice daily   VENTOLIN HFA 90 mcg/actuation inhaler 2017  Yes No   Sig: inhale 2 puffs by mouth every 6 hours if needed   acidophilus-pectin, citrus (ACIDOPHILUS PROBIOTIC) 100 million cell-10 mg cap 2017  Yes No   Sig: Take  by mouth. apixaban (ELIQUIS) 5 mg tablet 2017  Yes No   Sig: Take 5 mg by mouth two (2) times a day. ferrous sulfate (IRON) 325 mg (65 mg iron) tablet 2017  Yes No   Sig: Take 65 mg by mouth Daily (before breakfast). glucose blood VI test strips (FREESTYLE TEST) strip 2017  No No   Sig: Check 3 times a day   insulin aspart (NOVOLOG FLEXPEN) 100 unit/mL inpn 2017  No No   Sig: Use 8 units each meal   insulin glargine (LANTUS,BASAGLAR) 100 unit/mL (3 mL) inpn 2017  No No   Si units at bedtime   Patient taking differently: 10 Units by SubCUTAneous route nightly. 24 units at bedtime   multivitamin (MEN'S MULTI-VITAMIN) tablet 2017  Yes No   Sig: Take 1 Tab by mouth daily. zolpidem (AMBIEN) 5 mg tablet Not Taking at Unknown time  Yes No   Sig: Take 1 Tab by mouth nightly as needed.       Facility-Administered Medications: None       Current Facility-Administered Medications   Medication Dose Route Frequency    NOREPINephrine (LEVOPHED) 8 mg in 5% dextrose 250mL infusion  2-16 mcg/min IntraVENous TITRATE    sodium bicarbonate (8.4%) 100 mEq in dextrose 5% 1,000 mL infusion   IntraVENous CONTINUOUS    octreotide (SANDOSTATIN) 500 mcg in 0.9% sodium chloride 500 mL infusion  50 mcg/hr IntraVENous CONTINUOUS    0.9% sodium chloride infusion 250 mL  250 mL IntraVENous PRN    0.9% sodium chloride infusion 250 mL  250 mL IntraVENous PRN    pantoprazole (PROTONIX) 80 mg in 0.9% sodium chloride 100 mL infusion  8 mg/hr IntraVENous CONTINUOUS    dextrose (D50W) injection syrg 12.5-25 g  25-50 mL IntraVENous PRN    insulin lispro (HUMALOG) injection   SubCUTAneous AC&HS    glucose chewable tablet 16 g  4 Tab Oral PRN    glucagon (GLUCAGEN) injection 1 mg  1 mg IntraMUSCular PRN    dextrose (D50W) injection syrg 12.5-25 g  25-50 mL IntraVENous PRN    sodium chloride (NS) flush 5-10 mL  5-10 mL IntraVENous Q8H    sodium chloride (NS) flush 5-10 mL  5-10 mL IntraVENous PRN    oxyCODONE-acetaminophen (PERCOCET 7.5) 7.5-325 mg per tablet 1 Tab  1 Tab Oral Q4H PRN    naloxone (NARCAN) injection 0.4 mg  0.4 mg IntraVENous PRN    diphenhydrAMINE (BENADRYL) injection 12.5 mg  12.5 mg IntraVENous Q4H PRN    ondansetron (ZOFRAN) injection 4 mg  4 mg IntraVENous Q4H PRN    docusate sodium (COLACE) capsule 100 mg  100 mg Oral BID    sodium chloride (NS) flush 5-10 mL  5-10 mL IntraVENous Q8H    sodium chloride (NS) flush 5-10 mL  5-10 mL IntraVENous PRN       Review of Systems:   A comprehensive review of systems was negative except for that written in the HPI. Data Review:    Labs: Results:       Chemistry Recent Labs      11/21/17 0338 11/20/17   1900   GLU  128*  129*   NA  129*  124*   K  6.1*  6.8*   CL  99*  93*   CO2  12*  10*   BUN  189*  >150*   CREA  6.69*  7.39*   CA  8.2*  8.7   AGAP  18  21*   BUCR  28*  Cannot be calculated   AP  959*  1182*   TP  5.4*  6.4   ALB  1.2*  1.4*   GLOB  4.2*  5.0*   AGRAT  0.3*  0.3*      PTH  Lab Results   Component Value Date/Time    Calcium 8.2 11/21/2017 03:38 AM      CBC w/Diff Recent Labs      11/21/17 0338 11/21/17   0053  11/20/17   1743   WBC  14.1*  14.8*  13.7*   RBC  3.35*  3.50*  2.55*   HGB  10.9*  11.1*  8.5*   HCT  30.1*  31.4*  24.5*   PLT  116*  125*  183   GRANS  91*  83*  90*   LYMPH  6*  6*  7*   EOS  0  0  0      Coagulation Recent Labs      11/21/17   0338  11/20/17   1743   PTP  17.1*  31.1*   INR  1.5*  3.1*   APTT   --   49.4*       Iron/Ferritin No results for input(s): IRON in the last 72 hours.     No lab exists for component: TIBCCALC   BNP No results for input(s): BNPP in the last 72 hours. Cardiac Enzymes No results for input(s): CPK, CKND1, SHRUTHI in the last 72 hours. No lab exists for component: CKRMB, TROIP   Liver Enzymes Recent Labs      11/21/17   0338   TP  5.4*   ALB  1.2*   AP  959*   SGOT  233*      Thyroid Studies Lab Results   Component Value Date/Time    TSH 1.71 04/18/2017 11:58 AM        Urinalysis Lab Results   Component Value Date/Time    Color DARK YELLOW 07/19/2017 02:00 AM    Appearance CLEAR 07/19/2017 02:00 AM    Specific gravity 1.021 07/19/2017 02:00 AM    pH (UA) 5.0 07/19/2017 02:00 AM    Protein TRACE 07/19/2017 02:00 AM    Glucose NEGATIVE  07/19/2017 02:00 AM    Ketone NEGATIVE  07/19/2017 02:00 AM    Bilirubin NEGATIVE  07/19/2017 02:00 AM    Urobilinogen 1.0 07/19/2017 02:00 AM    Nitrites NEGATIVE  07/19/2017 02:00 AM    Leukocyte Esterase NEGATIVE  07/19/2017 02:00 AM    Epithelial cells FEW 07/19/2017 02:00 AM    Bacteria FEW 07/19/2017 02:00 AM    WBC 0 to 2 07/19/2017 02:00 AM    RBC 0 to 2 07/19/2017 02:00 AM         IMAGES:   XR Results (maximum last 3): Results from Hospital Encounter encounter on 11/20/17   XR CHEST SNGL V   Narrative EXAM: Chest X-Ray    INDICATION: Weakness    TECHNIQUE: AP view of the chest    COMPARISON: 7/19/2017 and CT dated 11/7/2017    FINDINGS:   Tubes and Lines: There is an unchanged right-sided Mediport with tip overlying  the SVC. Pleura: No pneumothorax appreciated. No effusions appreciated. Lungs:  Linear changes noted within the lung similar to prior imaging. This is  likely a chronic finding. There is suggestion of a nodule in the left upper lung  field which may correspond with CT findings. Additional nodular changes in the  lungs are not as well appreciated as on prior CT. Cardiac silhouette: It is unremarkable    Pulmonary Vascularity: The pulmonary vasculature is unremarkable. Osseous Structures: Unremarkable         Impression IMPRESSION:  1.   Chronic lung changes and likely nodular metastases that are better seen on  recent CT. 2.  Mediport in good position. Results from Hospital Encounter encounter on 07/18/17   XR CHEST PORT   Narrative Chest One View portable 0103 hours    CPT CODE: 69244    HISTORY: Severe coughing with clear mucus production and rattling  X2 days associated with shortness of breath, history of liver cancer with last  chemotherapy 2 weeks ago, fever. COMPARISON: CT chest 7/8/2017. FINDINGS:     One view obtained. Right subclavian approach MediPort terminates at the proximal  superior vena cava. The cardiac and mediastinal silhouette is normal. The lungs  are hypoinflated. Subtle tiny nodular and linear opacities are scattered  throughout both lungs. . Pulmonary vascularity is normal. The costophrenic angles  are sharply defined. No bony abnormalities are seen. Impression IMPRESSION:    Hypoinflation with subtle prominent reticular nodular opacities throughout the  lungs. Differential diagnosis of atelectasis vs. pneumonia. .      Results from [place] encounter on 10/03/16   XR KNEE RT 3 V   Narrative Right knee joint, 3 views:        INDICATION:    New onset of pain involving right knee. History of metastatic colon carcinoma. COMPARISON STUDY: None. FINDINGS:    There is no evidence of fracture, malalignment of bones or any destructive  process at right knee. There is no effusion in the suprapatellar bursa. At the  upper aspect of right patellar, there is enthesopathy at the site of insertion  of quadriceps tendon. The joint spaces are well-preserved at right knee. Impression IMPRESSION:    Negative study. There is no definable focal lesion in bones of right knee. No evidence of fracture or malalignment of bones at right knee. CT Results (maximum last 3):     Results from [place] encounter on 11/07/17   CT CHEST ABD PELV WO CONT   Narrative PROCEDURE:  CT Chest, Abdomen, Pelvis without Contrast.             INDICATION:  Colon CA, chemotherapy for liver metastasis, follow-up for  treatment response C18.4, C78.7             COMPARISON:  7/8/17. TECHNIQUE:  Helically scanned volumetric 5 mm axial sections of the chest,  abdomen and pelvis are obtained without IV contrast administration (utilizing  only oral contrast). Coronal and sagittal reconstruction images are generated  to improve anatomic delineation.    - IV Contrast Dose:  100 mL Isovue-300. - Radiation Dose:   mGy-cm.  - Dose Reduction:     FINDINGS:    CT Chest    Lungs, Pleura:      - Multiple new scattered lung nodules are observed in both lungs in addition to  the previously observed right upper lobe posterior aspect nodule. - The 0.4 cm nodule noted on 7/8/17 is now 0.7 cm (axial #17). Multiple smaller  adjacent nodules. This is the largest nodule in the right upper lobe. The  largest nodule in the right middle lobe is 0.6 cm (axial #27). The largest  nodule in the right lower lobe is 0.6 cm (axial #22). The largest nodule in the  left upper lobe is 0.6 cm (axial #8). The largest left lower lobe nodule  measures about 0.6 cm (axial #19). - In addition to these new or enlarged scattered lung nodules, that are  scattered foci of irregular poorly circumscribed densities suggest the left  upper lobe lingular region focal density with stellate irregular borders,  measuring 1.0 x 0.9 cm. This focus appears unchanged compared to 7/8/17, favor  scar tissue.  - Multiple additional foci of scarring in both lungs. - No new infiltrate or consolidation. No significant pleural effusion. Mediastinum: No adenopathy. Meagan: No adenopathy. Vascular:  Atypical degree of ascending aorta dilation, measuring up to about  4.2 cm. Status post right IJ approach] single chamber infusion port placement. Base of Neck: The visualized base of the neck is unremarkable.       Axillae: No evidence of significant axillary adenopathy. Esophagus:  Unremarkable. CT Abdomen    Liver: The lack of IV contrast limits the ability to compare the previous and  current study [id number]. There does appear to be generalized increase in the  number and size of hepatic lesions compared to 7/8/17. As a point of  comparison, the left lobe of the liver lesion bounded by the gallbladder fossa  and the umbilical fissure demonstrates multiple new hypodense hepatic masses  which were not as obvious or numerous as on 7/8/17. The largest mass abutting  the umbilical fissure currently measures about 5.4 x 2.8 cm. Previously only  subtle hypodensities were apparent in this region on 7/8/17. Of note, some of  the confluent masses show developing calcifications (axial #37, #47). Gallbladder:  Contracted. Multiple small gallstones are observed. Spleen:  Mildly enlarged spleen. Pancreas, Adrenal Glands, Kidneys:  Unremarkable. Gastrointestinal Tract:      - Stomach:  Unremarkable. - Small Bowel:  Unremarkable. - Appendix:  No evidence of acute appendicitis. - Colon:  No acute findings. Peritoneal cavity:  Moderate ascites. Distinctly increased in the extent of  ascites compared to 7/8/17. Retroperitoneum:  No adenopathy. IVC filter in place. Vascular:  No aneurysm. CT Pelvis    Bladder:  Partially contracted. Grossly unremarkable. Rectum:  Unremarkable. Pelvic Sidewall:  Multiple small nodes in the groin regions bilaterally. Apparent left inguinal hernia with protrusion of the ascites into the left  inguinal canal.    Prostate:  Grossly unremarkable. Skeletal Structures:  No osteoblastic or osteolytic changes are detected. Impression IMPRESSION:            1. New multiple lung nodules with apparent interval increase in the size of the  right upper lobe nodule noted on 7/8/17. 2.  Increased number and size of the liver mass compared to 7/8/17.   There are  increased calcifications in the liver. Significance of this finding is unclear. 3.  Increased ascites. 4.  Increased fluid in the left inguinal canal, probably indicative of inguinal  hernia with increased fluid reflecting increased ascites. Results from [place] encounter on 08/10/17   CT GUIDE CATH FLUID DRAIN SOFT TISSUE   Narrative Preprocedure diagnosis: Ascites. CT-guided paracentesis was performed following  CT-guided biopsy of the liver. Post procedure diagnosis: Successful CT-guided 4 L paracentesis    : Amauri Humphries    Assistant: None    Estimated blood loss: Minimal    Procedure: Patient was placed in a supine position. Lower abdomen was scanned. Large pocket of fluid was identified in the right lower quadrant. Skin was  marked. The skin was then prepped and draped in a sterile fashion. 5 Amharic catheter needle was inserted into the peritoneal space. Paracentesis  was performed. A total of 4 L was aspirated. Catheter was then removed. Complications: None    Tissue removed: 4 L of ascites removed and discarded. Drainage tube: None    Condition: Stable and unchanged    Disposition: Patient was transfer back to nursing unit. CT BX LIVER NDL PERC   Narrative EXAM:  CT BX LIVER NDL PERC    INDICATION:   Colon cancer [facility])    : Amauri Humphries. Asst.: None. Medication administered: Versed and fentanyl IV. Lidocaine. CT dose reduction was achieved through use of a standardized protocol tailored  for this examination and automatic exposure control for dose modulation. CT-guided biopsy of the liver: The procedure including the risk of bleeding and  infection was explained to the patient and verbal and written informed consent  was obtained. The patient was placed into the CT scanner in the supine position, images were  obtained and a site was localized for biopsy of the lesions in the left lobe the  liver.  Anterior midline abdomen was marked and prepped and draped in sterile  fashion. A 18-gauge Temno coaxial biopsy system was utilized. The 17-gauge introducer  needle was advanced to the margin of the mass. 18-gauge biopsy needle was  inserted. Biopsy specimens were obtained and given to the cytotechnologist and   pathologist to confirm the adequacy of the tissue sample. Touch preparations  were made. The needle was removed and a bandage applied. The patient was monitored by the radiology nurse throughout the procedure with  pulse oximetry and EKG monitoring and Versed and fentanyl were administered for  conscious sedation. The patient tolerated the procedure well without apparent  complication and will be recovered in the radiology holding unit and discharged  to home if stable. Estimated blood loss: Minimal.    Complications: None apparent. Specimen removed: 18-gauge cores of the mass in the left lobe of the liver. CONDITION: Stable and unchanged. DISPOSITION: Patient remain in the CT scanner for CT guided paracentesis. MRI Results (maximum last 3): No results found for this or any previous visit. Nuclear Medicine Results (maximum last 3): No results found for this or any previous visit. US Results (maximum last 3): Results from [place] encounter on 11/15/17   [address] [facility]   Narrative This procedure was performed in its entirety by a Physician Assistant. Procedure: Ultrasound-guided paracentesis. : Tristin Cole PA-C    Attending Physician: Dr. Yuri Drummond    Assistant: None    Specimen: Ascites    EBL: Minimal    Indications: Ascites    Anesthesia: 1% lidocaine locally    Implants: None    Complications: None    Technique: Using ultrasound guidance a large pocket of peritoneal fluid was  localized and marked in the right lower quadrant. The patient was prepped and  draped in the usual sterile fashion and sterile barrier technique was utilized. 1% lidocaine was infiltrated locally.  A [address] [place] [address] over-the-needle catheter was  advanced into the peritoneal cavity and dark yellow color fluid was aspirated. Once of fluid was easily aspirated the needle was removed leaving the catheter  in place. The catheter was connected to vacuum containers and 3.05 L was  removed. Ultrasound images were saved for documentation. The patient tolerated the procedure well. Impression Impression:      1. Successful paracentesis of 3.05 liters of ascites        Results from [place] encounter on 11/01/17   US GUIDE PARACENTESIS   Narrative US GUIDE PARACENTESIS    Indication:   Ascites, malignant    Attending: Tereza Garza M.D. Assistant: None     Specimen: Ascites    Estimated Blood loss: Minimal     Indications: Ascites    Anesthesia: 1% lidocaine locally    Implants: None    Complications: None    Technique: Using ultrasound guidance a large pocket of peritoneal fluid was  localized and marked in the right lower quadrant. The patient was prepped and  draped in the usual sterile fashion and sterile barrier technique was utilized. 1% lidocaine was infiltrated locally. A 5 Swedish over-the-needle catheter was  advanced into the peritoneal cavity and dark yellow color fluid was aspirated. Once of fluid was easily aspirated the needle was removed leaving the catheter  in place. The catheter was connected to vacuum containers and 2250 mL was  removed. Ultrasound images were saved for documentation. The patient tolerated the procedure well. Impression Impression:    1. Successful paracentesis of 2.3 liters of ascites          Results from [place] encounter on 10/25/17   [facility]   Narrative Ultrasound-Guided Paracentesis    Attending: Deric Rollins MD    Indication: Ascites    Preoperative Diagnosis: Ascites    Postoperative Diagnosis: Same    Anesthesia: Local anesthesia with 1% lidocaine. Technique:  The risks, benefits, and alternatives of the procedure were discussed  with the patient. Verbal and written consent was obtained. Time-out was  performed to confirm the correct patient, procedure, and site. With the patient  supine, the skin was prepped and draped in usual sterile fashion. Maximum  sterile barrier technique used. Local anesthesia was administered. Under  ultrasound guidance with permanent image storage, the target fluid was accessed  with a 5-Serbian Yueh sheathed catheter and hooked to vacuum suction. Sterile  dressing was applied. Postprocedure imaging was performed. Fluid removed: 2.9 L, right lower quadrant    Specimen: None     Estimated Blood Loss: Minimal    Contrast: None    Complications: No immediate    Drain/Implant: None    Condition: Stable    Disposition: Nurse monitoring recovery unit    _______________         Impression Impression:     Successful paracentesis          DEXA Results (maximum last 3): No results found for this or any previous visit. LOVE Results (maximum last 3): No results found for this or any previous visit. IR Results (maximum last 3): No results found for this or any previous visit. VAS/US Results (maximum last 3): No results found for this or any previous visit. PET Results (maximum last 3): No results found for this or any previous visit. No results found for this or any previous visit. @LASTPROCAMB([id number])    CULTURE:   )No results for input(s): SDES, CULT in the last 72 hours. No results for input(s): CULT in the last 72 hours.     Physical Assessment:     Visit Vitals    BP (!) 80/61    Pulse 88    Temp 97.6 °F (36.4 °C)    Resp 13    Ht 5' 7\" (1.702 m)    Wt 69.2 kg (152 lb 9.6 oz)    SpO2 100%    BMI 23.9 kg/m2     Last 3 Recorded Weights in this Encounter    11/20/17 1815 11/21/17 0500   Weight: 70.3 kg (155 lb) 69.2 kg (152 lb 9.6 oz)       Intake/Output Summary (Last 24 hours) at 11/21/17 1004  Last data filed at 11/20/17 1800   Gross per 24 hour   Intake                0 ml   Output 500 ml   Net             -500 ml       Physial Exam:  General appearance: alert, cachectic  Skin: jaundiced  HEENT: Head; normocephalic, atraumatic. ASHLEY. ENT- ENT exam normal, no neck nodes or sinus tenderness. Lungs: clear to auscultation bilaterally  Heart: regular rate and rhythm, S1, S2 normal, no murmur, click, rub or gallop  Abdomen: hard liver,ascites  Extremities: diffuse anasarca    PLAN / RECOMMENDATION:    arf in pt with metastatic ca and liver failure. check stat renal ultrasound to r/o obstruction. pt is not a candidate for dialysis considering his terminal disease. reviewed his hematologist,dr hanks,note. palliative care consult was requested  Hyperkalemia,acidosis,switch to bicarb drip and treat medically  Anemia,gi bleed . transfused     Thank you for the consultation to participate in patient's care. I have personally discussed my plan with the referring physician.      Deonte Salmon MD  November 21, 2017

## 2017-11-21 NOTE — PROGRESS NOTES
Problem: Falls - Risk of  Goal: *Absence of Falls  Document Lea Fall Risk and appropriate interventions in the flowsheet.    Outcome: Progressing Towards Goal  Fall Risk Interventions:                 Elimination Interventions: Patient to call for help with toileting needs, Urinal in reach

## 2017-11-21 NOTE — PROGRESS NOTES
Called by Dr Anish Avila for this patient who presented with acute GIB and SADA . Pt has Hx of Colon CA, metastatic to the  liver and now with new/enlarging multiple lung nodules , increasing ascites requiring repeated paracentesis. Pt had paracentesis 5 days ago with 3 L removed. BUN crea and K were already elevated at that time and it appears there was no intervention done. He is now admitted with active bleeding, requiring Kcentra reversal of Eliquis. He currently remains hypotensive and is sched for BT and IVF. Suggest continued supportive measures (blood products, saline, albumin, vasopressor) to support BP and conservative management of elevated K with  IV D50 W, Insulin, calcium gluconate and bicarbonate, repeat if needed to control K. Patient is not a candidate for dialysis at this time given significant hemodynamic compromise. Given his underlying Dx he also needs to be assessed by the GI and Oncology service regarding overall prognosis to determine appropriateness of invasive treatment such as dialysis. (I did note that Dr. Soto Echols has already spoken to Dr Anish Avila and has indicated that patient has been declining and this is a terminal illness. She has recommended palliative care).

## 2017-11-21 NOTE — ED NOTES
Patient cleaned and dried of urine, new brief placed on patient, he was able to minimally assist in repositioning, will continue to monitor.

## 2017-11-21 NOTE — CONSULTS
[email or web address]  [phone]    Impression:   1.GI bleed- No overt GI hemorrhage  2. Ascites- weekly paracentesis  3. Metastatic colon Ca- on po chemo  -CT abdomen 11/7/17- New multiple lung nodules with apparent interval increase in the size of the  right upper lobe nodule noted on 7/8/17. Increased number and size of the liver mass compared to 7/8/17. There are  increased calcifications in the liver. Significance of this finding is unclear. Increased ascites. -BILI- 11   PLTS 125   4. Hypotension- on levophed  5. Acute renal failure-   -creatinine 6.69- not a candidate for dialysis  6. H/O PE- was on eloquis  7. Anemia- H/H  Today- 10.9/30.1  -S/p  2 units PRBC        Plan:     1. Cont PPI. Cont H/H transfuse if hgb<7  2. Iv Octreotide drip  3. Cont support care  4. No invasive GI procedure at this time due to multiple co morbidities. Await further recommendation from attending        Chief Complaint: Gi bleed      HPI:  Alena Amaya is a 47 y.o. male who is being seen on consult for Gi bleed. Pt has been experiencing rectal bleed, hematemesis that started yesterday in the am.Presently pt denies abdominal pain, melena, hematemesis or hematochezia. Pt presented to the ER for increased weakness, ascites, renal failure and hypotensive. Pt history of metastatic colon ca on po chemo. Full history noted above    PMH:   Past Medical History:   Diagnosis Date    Bleeding     [facility]     Diabetes ([facility]     HTN (hypertension)     Liver cancer (HCC)        PSH:   History reviewed. No pertinent surgical history. Social HX:   Social History     Social History    Marital status: SINGLE     Spouse name: N/A    Number of children: N/A    Years of education: N/A     Occupational History    Not on file. Social History Main Topics    Smoking status: Former Smoker     Types: Cigarettes     Quit date: 3/16/2017    Smokeless tobacco: Never Used    Alcohol use No    Drug use:  No  Sexual activity: Yes     Partners: Female     Other Topics Concern    Not on file     Social History Narrative       FHX:   Family History   Problem Relation Age of Onset   [facility] Cancer Father      lung       Allergy:   No Known Allergies    Home Medications:     Prescriptions Prior to Admission   Medication Sig    LONSURF 20-8. 19 mg tab     FREESTYLE LANCETS 28 gauge misc USE LANCET TO CHECK BLOOD GLUCOSE 4 TIMES DAILY    HYDROcodone-acetaminophen (NORCO) 5-325 mg per tablet Take 1 Tab by mouth every six (6) hours as needed.  insulin glargine (LANTUS,BASAGLAR) 100 unit/mL (3 mL) inpn 24 units at bedtime (Patient taking differently: 10 Units by SubCUTAneous route nightly. 24 units at bedtime)    insulin aspart (NOVOLOG FLEXPEN) 100 unit/mL inpn Use 8 units each meal    BD INSULIN PEN NEEDLE UF SHORT 31 gauge x 5/16\" ndle Use 4 times a day    VENTOLIN HFA 90 mcg/actuation inhaler inhale 2 puffs by mouth every 6 hours if needed    zolpidem (AMBIEN) 5 mg tablet Take 1 Tab by mouth nightly as needed.  apixaban (ELIQUIS) 5 mg tablet Take 5 mg by mouth two (2) times a day.  Blood-Glucose Meter monitoring kit Needs free style glucometer    glucose blood VI test strips (FREESTYLE TEST) strip Check 3 times a day    Lancets misc Check twice daily    ferrous sulfate (IRON) 325 mg (65 mg iron) tablet Take 65 mg by mouth Daily (before breakfast).  acidophilus-pectin, citrus (ACIDOPHILUS PROBIOTIC) 100 million cell-10 mg cap Take  by mouth.  multivitamin (MEN'S MULTI-VITAMIN) tablet Take 1 Tab by mouth daily. Review of Systems:     A fourteen point review of systems was obtained, and was negative except per HPI. Visit Vitals    BP (!) 80/61    Pulse 88    Temp 97.6 °F (36.4 °C)    Resp 13    Wt 69.2 kg (152 lb 9.6 oz)    SpO2 100%    BMI 23.9 kg/m2       Physical Assessment:     constitutional: appearance: well developed, well nourished, in no acute distress.    skin: no rashes, jaundice  eyes: inspection: normal conjunctivae and lids; no scleral icterus pupils: equal, round, reactive to light; extraocular movements intact  ENMT: mouth: mucous membranes moist, no thrush  neck:  no masses or tenderness; normal range of motion  respiratory: breath sounds clear, no wheeze/rales/rhonchi  cardiovascular: normal rate, regular rhythm without murmur  abdominal: soft, bowel sounds present, non-tender to palpation without rebound or guarding; no appreciable mass; no appreciable hepatosplenomegaly; no appreciable fluid wave  extremities: no clubbing, cyanosis, edema  neurologic:  Cranial nerves II-XII grossly intact; no asterixis   psychiatric:  oriented to time, space and person. Basic Metabolic Profile   Recent Labs      11/21/17 0338 11/20/17   1900   NA  129*  124*   K  6.1*  6.8*   CL  99*  93*   CO2  12*  10*   BUN  189*  >150*   GLU  128*  129*   CA  8.2*  8.7   MG   --   3.5*         CBC w/Diff    Recent Labs      11/21/17 0338   WBC  14.1*   RBC  3.35*   HGB  10.9*   HCT  30.1*   MCV  89.9   MCH  32.5   MCHC  36.2   RDW  19.2*   PLT  116*    Recent Labs      11/21/17 0338   GRANS  91*   LYMPH  6*   EOS  0        Hepatic Function   Recent Labs      11/21/17 0338 11/20/17   1900   ALB  1.2*  1.4*   TP  5.4*  6.4   TBILI  11.0*  11.4*   SGOT  233*  286*   AP  959*  1182*   LPSE   --   [address] NP  [facility]  [email or web address]

## 2017-11-21 NOTE — ED NOTES
Bedside report received from [facility] assuming care of patient. No acute distress noted at this time. Vital signs are stable.  Patient resting comfortably on stretcher

## 2017-11-21 NOTE — PROGRESS NOTES
NUTRITION    Nursing Referral: [facility]     RECOMMENDATIONS / PLAN:     - Start diet as medically appropriate.   - Continue RD inpatient monitoring and evaluation. NUTRITION INTERVENTIONS & DIAGNOSIS:     [] Meals/Snacks: modified diet   [] Medical food supplementation   [x] Coordination of care: interdisciplinary rounds     Nutrition Diagnosis: Unintentional weight loss related to inadequate energy intake as evidenced by 15 lb, 9% weight loss in the past 1-2 weeks. Patient meets criteria for Severe Protein Calorie Malnutrition as evidenced by:   ASPEN Malnutrition Criteria  Acute Illness, Chronic Illness, or Social/Enviornmental: Acute illness  Energy Intake: Less than/equal to 50% est energy req for greater than/equal to 5 days  Weight Loss: Greater than 5% x 1 mo  Body Fat: Moderate (orbital region)  Muscle Mass: Moderate (temple region, clavicle region)  ASPEN Malnutrition Score - Acute Illness: 24  Acute Illness - Malnutrition Diagnosis: Severe malnutrition. ASSESSMENT:     Pt reports poor meal intake for the past several weeks and significant weight loss. Asking for food. Nutrition focused physical exam completed. Worsening renal function. NPO with GI bleed. Average po intake adequate to meet patients estimated nutritional needs:   [] Yes     [x] No   [] Unable to determine at this time    Diet: DIET NPO      Food Allergies: NKFA  Current Appetite:   [] Good     [] Fair     [] Poor     [x] Other: NPO  Appetite/meal intake prior to admission:   [] Good     [] Fair     [x] Poor x several weeks    [] Other:  Feeding Limitations:  [] Swallowing difficulty    [] Chewing difficulty    [] Other:  Current Meal Intake: No data found.     BM: PTA   Skin Integrity: WDL  Edema: none   Pertinent Medications: Reviewed: octreotide, levophed, colace, NS at 125 mL/hr     Recent Labs      11/21/17   0338  11/20/17   1900   NA  129*  124*   K  6.1*  6.8*   CL  99*  93*   CO2  12*  10*   GLU  128*  129*   BUN  189* >150*   CREA  6.69*  7.39*   CA  8.2*  8.7   MG   --   3.5*   ALB  1.2*  1.4*   SGOT  233*  286*   ALT  105*  122*       Intake/Output Summary (Last 24 hours) at 11/21/17 1057  Last data filed at 11/20/17 1800   Gross per 24 hour   Intake                0 ml   Output              500 ml   Net             -500 ml       Anthropometrics:  Ht Readings from Last 1 Encounters:   11/21/17 5' 7\" (1.702 m)     Last 3 Recorded Weights in this Encounter    11/20/17 1815 11/21/17 0500   Weight: 70.3 kg (155 lb) 69.2 kg (152 lb 9.6 oz)     Body mass index is 23.9 kg/(m^2). Weight History: patient reports 15 lb, 9% weight loss in the past 1-2 weeks     Weight Metrics 11/21/2017 9/29/2017 9/8/2017 8/10/2017 7/18/2017 4/21/2017 3/28/2017   Weight 152 lb 9.6 oz 157 lb 162 lb 9.6 oz 161 lb 4 oz 150 lb 151 lb 158 lb 12.8 oz   BMI 23.9 kg/m2 24.59 kg/m2 25.47 kg/m2 25.26 kg/m2 23.49 kg/m2 24.01 kg/m2 25.25 kg/m2        Admitting Diagnosis: Acute renal failure (ARF) (HCC)  Bleeding esophageal varices (HCC)  Hyperkalemia  Hypotension  GI bleed  Hypotension  Renal failure  Hyperkalemia  Metastatic colorectal cancer (HCC)  Pertinent PMHx: colon cancer, DM, HTN, liver cancer     Education Needs:        [x] None identified  [] Identified - Not appropriate at this time  []  Identified and addressed - refer to education log  Learning Limitations:   [x] None identified  [] Identified    Cultural, Yarsanism & ethnic food preferences:  [x] None identified    [] Identified and addressed     ESTIMATED NUTRITION NEEDS:     Calories: 9322-1794 kcal (HBEx1.3-1.5) based on  [x] Actual BW 70 kg     [] IBW   Protein:  gm (1-1.5 gm/kg) based on  [x] Actual BW      [] IBW   Fluid: 1 mL/kcal     MONITORING & EVALUATION:     Nutrition Goal(s):   1. Po intake of meals will meet >75% of patient estimated nutritional needs within the next 7 days.   Outcome:  [] Met/Ongoing    []  Not Met    [x] New/Initial Goal     Monitoring:   [x] Diet tolerance [x] Meal intake   [] Supplement intake   [] GI symptoms/ability to tolerate po diet   [] Respiratory status   [] Plan of care      Previous Recommendations (for follow-up assessments only):     []   Implemented       []   Not Implemented (RD to address)      [] No Longer Appropriate     [] No Recommendation Made     Discharge Planning: cardiac, diabetic diet   [x] Participated in care planning, discharge planning, & interdisciplinary rounds as appropriate      Daryl Francisco, [address] [place] [address], [address] [place] [address]    Pager: [phone]

## 2017-11-21 NOTE — CONSULTS
[facility]: [phone]  [facility]: [phone]   [facility]: [phone]    Patient Name: Aretha Wolfe  YOB: 1965    Date of Initial Consult: 11/21/2017  Reason for Consult: care decisions  Requesting Provider: Dr. Randall Elder   Primary Care Physician: Zully Jones MD      SUMMARY:   Aretha Wolfe is a 47y.o. year old with a past history of metastatic colon cancer to the liver, on 4th  line chemotherapy, diabetes, hypertension, who was admitted on 11/20/2017 from home with a diagnosis of rectal bleeding and hematemesis. In the ER he was found to have acute renal failure, hyperkalemia, hypotension. Current medical issues leading to Palliative Medicine involvement include: 47year old male with metastatic colon cancer, now with hepatorenal syndrome, who is found to not be a candidate for dialysis and has a very poor prognosis. Palliative medicine is consulted to discuss goals of care and care decisions. PALLIATIVE DIAGNOSES:   1. Advanced care plan discussions  2. Metastatic colon cancer   3. Acute renal failure   4. GI bleed         PLAN:   1. Patient seen at bedside, along with Ms. Cristóbal Antonio NP. We have seen patient x 2 today. This am, alerted to his name, lethargic, knows he is in the hospital, who the president of the [address] [place] [address] Rd[address] is. . Was able to tell us he lives with his girlfriend Miki Khan and we could speak with her. Miki Khan later joined at bedside. Social; lives with his girlfriend, has 2 children 25and 32years old, per Miki Khan not greatly involved in his life, has 2 sisters and a mother, all live in [facility]. He no longer works and was not in [employer]. 2. Advanced care plan discussions; patient was more awake and alert this pm, he was able to consistently tell us he wanted his girlfriend Miki Khan to make his medical decisions. He was alert and oriented x 2, knew the president of the [address] [place] [address],[address] and could tell me after signing the AMD that he signed it because Fortunato Rose was going to make be the person to make his medical decisions. AMD signed by patient, with 3 witnesses. Have discussed at length with Fortunato Rose that patient has cancer that is not curable and has progressed despite many lines of treatment. He is now in renal failure, and not able to have treatments which could help the renal failure ( dialysis). Have shared he life is very short. Discussed burdens of heroics at end of life. Have recommended no CPR or intubation in the setting of metastatic colon cancer and now renal failure  Currently remains a full code wishes for family, sisters and mother before making a decision. She is aware, he may cardiac arrest, arrhthymias or require intubation before the night is over. 3. Metastatic colon cancer followed by Oncology, on 4th line chemotherapy. Has steady decline in health. Shared with Fortunato Rose despite the chemotherapy and aggressive care, his cancer has progressed unfortunately there are not treatments left. 4. Acute renal failure Nephrology on board, no a candidate for dialysis due to hypotension and metastatic cancer. All discussed with Fortunato Rose, who understands. 5. Initial consult note routed to primary continuity provider  6.  Communicated plan of care with: Palliative IDT, girlfriend, oncology, ICU staff        GOALS OF CARE:   Full aggressive care     Advance Care Planning 11/21/2017   Patient's Healthcare Decision Maker is: Legal Next of Ines 70   Primary Decision Maker Name Amilcar Zayas   Primary Decision Maker Phone Number [phone]   Primary Decision Maker Relationship to Patient Spouse   Confirm Advance Directive Yes, on file           HISTORY:     History obtained from: chart, oncology    CHIEF COMPLAINT: GI bleed     HPI/SUBJECTIVE:    The patient is:   [x] Verbal and participatory somewhat limited  [] Non-participatory due to:    Please see summary     Clinical Pain Assessment (nonverbal scale for nonverbal patients): Pain: 3  Complaining of leg pain        FUNCTIONAL ASSESSMENT:     Palliative Performance Scale (PPS): 30          PSYCHOSOCIAL/SPIRITUAL SCREENING:     Advance Care Planning:  Advance Care Planning 11/21/2017   Patient's Healthcare Decision Maker is: Legal Next of Ines Brown   Primary Decision Maker Name Vkitoriya Granger   Primary Decision Maker Phone Number [phone]   Primary Decision Maker Relationship to Patient Spouse   Confirm Advance Directive Yes, on file        Any spiritual / Pentecostalism concerns:  [] Yes /  [] No    Caregiver Burnout:  [] Yes /  [x] No /  [] No Caregiver Present      Anticipatory grief assessment:   [] Normal  / [] Maladaptive       ESAS Anxiety: Anxiety: 2    ESAS Depression:          REVIEW OF SYSTEMS:     Positive and pertinent negative findings in ROS are noted above in HPI. The following systems were [x] reviewed / [] unable to be reviewed as noted in HPI  Other findings are noted below. Systems: constitutional, ears/nose/mouth/throat, respiratory, gastrointestinal, genitourinary, musculoskeletal, integumentary, neurologic, psychiatric, endocrine. Positive findings noted below. Modified ESAS Completed by: provider   Fatigue: 6 Drowsiness: 1     Pain: 3   Anxiety: 2 Nausea: 0   Anorexia: 8 Dyspnea: 2                    PHYSICAL EXAM:     Wt Readings from Last 3 Encounters:   11/21/17 69.2 kg (152 lb 9.6 oz)   09/29/17 71.2 kg (157 lb)   09/08/17 73.8 kg (162 lb 9.6 oz)     Blood pressure (!) 86/61, pulse 95, temperature 97.8 °F (36.6 °C), resp. rate 10, height 5' 7\" (1.702 m), weight 69.2 kg (152 lb 9.6 oz), SpO2 100 %.   Pain:  Pain Scale 1: Adult Nonverbal Pain Scale  Pain Intensity 1: 0                     Constitutional: thin frail male who is lying in bed, at times slightly uncomfortable but in NAD   Respiratory: breathing not labored  Skin: pale  Neurologic: alerts to his name, oriented x 2 ( not date) knows POTUS, knows he has cancer,  Psychiatric: full affect, no hallucinations  Other:       HISTORY:     Principal Problem:    Hypotension (11/20/2017)    Active Problems:    Malignant neoplasm of colon (HCC)/ metastasis to liver. following Dr. Mia Cifuentes  (3/28/2017)      Pulmonary embolus (HCC)/ post rt leg DVT/. post IVC filter . on eliquis  (3/28/2017)      Type 2 diabetes mellitus with hyperglycemia, with long-term current use of insulin ([address] (4/18/2017)      Bleeding esophageal varices ([address] (11/20/2017)      Hyperkalemia (11/20/2017)      Acute renal failure (ARF) ([address] (11/20/2017)      Renal failure (11/20/2017)      GI bleed (11/20/2017)      Metastatic colorectal cancer ([address] (11/20/2017)      Past Medical History:   Diagnosis Date    Bleeding     Colon cancer ([address]     Diabetes [facility]     HTN (hypertension)     Liver cancer ([facility]       History reviewed. No pertinent surgical history. Family History   Problem Relation Age of Onset    Cancer Father      lung     History reviewed, no pertinent family history.   Social History   Substance Use Topics    Smoking status: Former Smoker     Types: Cigarettes     Quit date: 3/16/2017    Smokeless tobacco: Never Used    Alcohol use No     No Known Allergies   Current Facility-Administered Medications   Medication Dose Route Frequency    NOREPINephrine (LEVOPHED) 8 mg in 5% dextrose 250mL infusion  2-16 mcg/min IntraVENous TITRATE    sodium bicarbonate (8.4%) 100 mEq in dextrose 5% 1,000 mL infusion   IntraVENous CONTINUOUS    octreotide (SANDOSTATIN) 500 mcg in 0.9% sodium chloride 500 mL infusion  50 mcg/hr IntraVENous CONTINUOUS    0.9% sodium chloride infusion 250 mL  250 mL IntraVENous PRN    0.9% sodium chloride infusion 250 mL  250 mL IntraVENous PRN    pantoprazole (PROTONIX) 80 mg in 0.9% sodium chloride 100 mL infusion  8 mg/hr IntraVENous CONTINUOUS    dextrose (D50W) injection syrg 12.5-25 g  25-50 mL IntraVENous PRN    insulin lispro (HUMALOG) injection   SubCUTAneous AC&HS    glucose chewable tablet 16 g  4 Tab Oral PRN    glucagon (GLUCAGEN) injection 1 mg  1 mg IntraMUSCular PRN    dextrose (D50W) injection syrg 12.5-25 g  25-50 mL IntraVENous PRN    sodium chloride (NS) flush 5-10 mL  5-10 mL IntraVENous Q8H    sodium chloride (NS) flush 5-10 mL  5-10 mL IntraVENous PRN    oxyCODONE-acetaminophen (PERCOCET 7.5) 7.5-325 mg per tablet 1 Tab  1 Tab Oral Q4H PRN    naloxone (NARCAN) injection 0.4 mg  0.4 mg IntraVENous PRN    diphenhydrAMINE (BENADRYL) injection 12.5 mg  12.5 mg IntraVENous Q4H PRN    ondansetron (ZOFRAN) injection 4 mg  4 mg IntraVENous Q4H PRN    docusate sodium (COLACE) capsule 100 mg  100 mg Oral BID    sodium chloride (NS) flush 5-10 mL  5-10 mL IntraVENous Q8H    sodium chloride (NS) flush 5-10 mL  5-10 mL IntraVENous PRN        LAB AND IMAGING FINDINGS:     Lab Results   Component Value Date/Time    WBC 14.1 11/21/2017 03:38 AM    HGB 10.5 11/21/2017 12:00 PM    PLATELET 567 36/93/0989 03:38 AM     Lab Results   Component Value Date/Time    Sodium 129 11/21/2017 10:17 AM    Potassium 5.6 11/21/2017 10:17 AM    Chloride 99 11/21/2017 10:17 AM    CO2 12 11/21/2017 10:17 AM     11/21/2017 10:17 AM    Creatinine 6.76 11/21/2017 10:17 AM    Calcium 8.1 11/21/2017 10:17 AM    Magnesium 3.5 11/20/2017 07:00 PM      Lab Results   Component Value Date/Time    AST (SGOT) 233 11/21/2017 03:38 AM    Alk.  phosphatase 959 11/21/2017 03:38 AM    Protein, total 5.4 11/21/2017 03:38 AM    Albumin 1.2 11/21/2017 03:38 AM    Globulin 4.2 11/21/2017 03:38 AM     Lab Results   Component Value Date/Time    INR 1.5 11/21/2017 03:38 AM    Prothrombin time 17.1 11/21/2017 03:38 AM    aPTT 49.4 11/20/2017 05:43 PM      No results found for: IRON, FE, TIBC, IBCT, PSAT, FERR   No results found for: PH, PCO2, PO2  No components found for: [place]   Lab Results   Component Value Date/Time    CK 71 07/19/2017 12:25 AM    CK - MB <1.0 07/19/2017 12:25 AM              Total time: 110 minutes   Counseling / coordination time, spent as noted above: 90 minutes   > 50% counseling / coordination?: yes     Prolonged service was provided for  []30 min   []75 min in face to face time in the presence of the patient, spent as noted above. Time Start:  1400       1630   Time End:    1500        1730  Note: this can only be billed with  (initial) or 21 [phone] (follow up). If multiple start / stop times, list each separately.

## 2017-11-21 NOTE — CONSULTS
[facility]  Pulmonary, Critical Care, and Sleep Medicine    Name: Geovanny Rodriguez MRN: [id number]   : 1965 Hospital: [address] [place] [address]    Date: 2017        Critical Care Initial Patient Consult      IMPRESSION:   Hypotension secondary to acute blood loss- GI bleeding in setting of metastatic Colon ca and on anticoagulation   Ascites- malignant  Acute renal failure with hyperkalemia  Pulmonary embolism, other (HCC)/ post IVC/ on eliquis      Patient Active Problem List   Diagnosis Code    Malignant neoplasm of colon (HCC)/ metastasis to liver. following Dr. Issa August  C18.9    Pulmonary embolus (HCC)/ post rt leg DVT/. post IVC filter . on eliquis  I26.99    Type 2 diabetes mellitus with hyperglycemia, with long-term current use of insulin (HCC) E11.65, Z79.4    Bleeding esophageal varices (HCC) I85.01    Hyperkalemia E87.5    Hypotension I95.9    Acute renal failure (ARF) (HCC) N17.9        RECOMMENDATIONS:   · Stabilize hemodynamically- fluids, PRBC  · If indicated will start pressors  · Reverse anticoagulation  · Hold eliquis  · Correct hyperkalemia with pharmacotherapy  · Nephrology consulted- not a candidate for acute hemodialysis due to hypotension and advanced metastatic colon ca  · PPI - protonix drip, octeotride drip  · GI consulted  · Best supportive treatment  · Will discuss palliation with family and patient, oncology has had discussions  · Glycemic control  · ICU best practices- IHI bundles- lines , mediport  · Monitor I and O's  · Will follow from CC standpoint     Subjective/History: This patient has been seen and evaluated at the request of Dr. Laxmi Merchant  for  Critical care admission- Acute GI bleed, hypotension and Acute renal failure.     17  Patient is a 47 y.o. male with PMHx of liver and colon cancer with mets to liver, lungs and recurrent malignant ascites presents to the ED with c/o black bloody hematemesis and blood in stool for the past 1 week. Associated symptoms include decreased appetite, decreased PO intake, increased weight loss and BLE swelling. Reports emesis upon PO intake. Notes he has a medi-port in place. He is followed by Dr. Taylor Savage oncologist who diagnosed him last year. Admits Dr. Taylor Savage is not aware of patient's symptoms, next appointment with Dr. Taylor Savage is in 2 days. Reports he is no longer undergoing chemo, is taking medications for tx. He comes to IR every week for paracentesis. Pt had paracentesis 5 days ago with 3 L removed. BUN crea and K were already elevated at that time and it appears there was no intervention done. He is now admitted with active bleeding, requiring Kcentra reversal of Eliquis. He currently remains hypotensive and is currently getting PRBC transfusion and IVF. ICU admission requested for complex acute- critical management. Patient is followed by PCP Dr. Xavier Nuñez. No other symptoms or concerns were expressed. Past Medical History:   Diagnosis Date    Bleeding     [facility] ([facility]     Diabetes ([facility]     HTN (hypertension)     Liver cancer ([facility]       No past surgical history on file. Prior to Admission medications    Medication Sig Start Date End Date Taking? Authorizing Provider   LONSURF 20-8. 19 mg tab  9/27/17   Historical Provider   FREESTYLE LANCETS 28 gauge misc USE LANCET TO CHECK BLOOD GLUCOSE 4 TIMES DAILY 9/19/17   Historical Provider   HYDROcodone-acetaminophen (NORCO) 5-325 mg per tablet Take 1 Tab by mouth every six (6) hours as needed. 8/10/17   Historical Provider   insulin glargine (LANTUS,BASAGLAR) 100 unit/mL (3 mL) inpn 24 units at bedtime  Patient taking differently: 10 Units by SubCUTAneous route nightly.  24 units at bedtime 9/8/17   Luis Herrera MD   insulin aspart (NOVOLOG FLEXPEN) 100 unit/mL inpn Use 8 units each meal 9/8/17   Luis Herrera MD   BD INSULIN PEN NEEDLE UF SHORT 31 gauge x 5/16\" ndle Use 4 times a day 9/8/17   Luis Herrera MD   VENTOLIN HFA 90 mcg/actuation inhaler inhale 2 puffs by mouth every 6 hours if needed 17   Historical Provider   zolpidem (AMBIEN) 5 mg tablet Take 1 Tab by mouth nightly as needed. 17   Historical Provider   apixaban (ELIQUIS) 5 mg tablet Take 5 mg by mouth two (2) times a day. Historical Provider   Blood-Glucose Meter monitoring kit Needs free style glucometer 17   Pieter Rajan MD   glucose blood VI test strips (FREESTYLE TEST) strip Check 3 times a day 17   Pieter Rajan MD   Lancets misc Check twice daily 17   Pieter Rajan MD   ferrous sulfate (IRON) 325 mg (65 mg iron) tablet Take 65 mg by mouth Daily (before breakfast). Historical Provider   acidophilus-pectin, citrus (ACIDOPHILUS PROBIOTIC) 100 million cell-10 mg cap Take  by mouth. Historical Provider   multivitamin (MEN'S MULTI-VITAMIN) tablet Take 1 Tab by mouth daily. Historical Provider     Current Facility-Administered Medications   Medication Dose Route Frequency    0.9% sodium chloride infusion  125 mL/hr IntraVENous CONTINUOUS    octreotide (SANDOSTATIN) 500 mcg in 0.9% sodium chloride 500 mL infusion  50 mcg/hr IntraVENous CONTINUOUS    pantoprazole (PROTONIX) 80 mg in 0.9% sodium chloride 100 mL infusion  8 mg/hr IntraVENous CONTINUOUS    0.9% sodium chloride (MBP/ADV) 0.9 % infusion         No Known Allergies   Social History   Substance Use Topics    Smoking status: Former Smoker     Types: Cigarettes     Quit date: 3/16/2017    Smokeless tobacco: Never Used    Alcohol use No      Family History   Problem Relation Age of Onset    Cancer Father      lung        Review of Systems:  Review of systems not obtained due to patient factors.     Objective:   Vital Signs:    Visit Vitals    BP (!) 84/60    Pulse 90    Temp 95.3 °F (35.2 °C)    Resp 14    Wt 70.3 kg (155 lb)    SpO2 100%    BMI 24.28 kg/m2       O2 Device: Room air       Temp (24hrs), Av.2 °F (35.1 °C), Min:95 °F (35 °C), Max:95.3 °F (35.2 °C)       Intake/Output:   Last shift:         Last 3 shifts: 11/19 0701 - 11/20 1900  In: -   Out: 500     Intake/Output Summary (Last 24 hours) at 11/20/17 2109  Last data filed at 11/20/17 1800   Gross per 24 hour   Intake                0 ml   Output              500 ml   Net             -500 ml     . Physical Exam:     General:  Alert, cooperative, no distress. cachectic   Head:  Normocephalic, without obvious abnormality, atraumatic. Eyes:  Conjunctivae/corneas clear. PERRL,   Nose: Nares normal. Septum midline. Mucosa dry No drainage or sinus tenderness. Throat: Lips, mucosa, and tongue normal. Teeth and gums normal.   Neck: Supple, symmetrical, trachea midline, no adenopathy, thyroid: no enlargment/tenderness/nodules, no carotid bruit and no JVD. Back:   Symmetric, no curvature. ROM normal.   Lungs:   Bibasilar rales R>L   Chest wall:  No tenderness or deformity. Mediport right infraclavicular   Heart:  Regular rate and rhythm, S1, S2 normal, no murmur, click, rub or gallop. Abdomen:   Soft, non-tender. Bowel sounds normal. No masses,  No organomegaly. Extremities: Extremities normal, atraumatic, no cyanosis , +2 edema. Pulses: 2+ and symmetric all extremities. Skin: Skin color, texture, decreased, No rashes or lesions       Neurologic: Grossly nonfocal       Data:     Recent Results (from the past 24 hour(s))   CBC WITH AUTOMATED DIFF    Collection Time: 11/20/17  5:43 PM   Result Value Ref Range    WBC 13.7 (H) 4.6 - 13.2 K/uL    RBC 2.55 (L) 4.70 - 5.50 M/uL    HGB 8.5 (L) 13.0 - 16.0 g/dL    HCT 24.5 (L) 36.0 - 48.0 %    MCV 96.1 74.0 - 97.0 FL    MCH 33.3 24.0 - 34.0 PG    MCHC 34.7 31.0 - 37.0 g/dL    RDW 18.5 (H) 11.6 - 14.5 %    PLATELET 015 824 - 102 K/uL    MPV 10.9 9.2 - 11.8 FL    NEUTROPHILS 90 (H) 40 - 73 %    LYMPHOCYTES 7 (L) 21 - 52 %    MONOCYTES 3 3 - 10 %    EOSINOPHILS 0 0 - 5 %    BASOPHILS 0 0 - 2 %    ABS. NEUTROPHILS 12.3 (H) 1.8 - 8.0 K/UL    ABS.  LYMPHOCYTES 1.0 0.9 - 3.6 K/UL    ABS. MONOCYTES 0.4 0.05 - 1.2 K/UL    ABS. EOSINOPHILS 0.0 0.0 - 0.4 K/UL    ABS.  BASOPHILS 0.0 0.0 - 0.1 K/UL    DF AUTOMATED     PROTHROMBIN TIME + INR    Collection Time: 11/20/17  5:43 PM   Result Value Ref Range    Prothrombin time 31.1 (H) 11.5 - 15.2 sec    INR 3.1 (H) 0.8 - 1.2     PTT    Collection Time: 11/20/17  5:43 PM   Result Value Ref Range    aPTT 49.4 (H) 23.0 - 36.4 SEC   EMERGENT RELEASE OF UNCROSSMATCHED RED CELLS    Collection Time: 11/20/17  5:43 PM   Result Value Ref Range    Crossmatch Expiration 11/23/2017     ABO/Rh(D) A POSITIVE     Antibody screen NEG     CALLED TO: LATISHA COLVIN, AT 18:46 ON 11/20/2017 BY [facility]     Unit number [id number]     Blood component type  LR AS1     Unit division 00     Status of unit TRANSFUSED     Crossmatch result Compatible     Unit number [id number]     Blood component type  LR AS1     Unit division 00     Status of unit ISSUED     Crossmatch result Compatible     Unit number [id number]     Blood component type  LR AS1     Unit division 00     Status of unit ALLOCATED     Crossmatch result Compatible    EKG, 12 LEAD, INITIAL    Collection Time: 11/20/17  6:47 PM   Result Value Ref Range    Ventricular Rate 89 BPM    Atrial Rate 89 BPM    P-R Interval 184 ms    QRS Duration 98 ms    Q-T Interval 392 ms    QTC Calculation (Bezet) 476 ms    Calculated P Axis 47 degrees    Calculated R Axis 24 degrees    Calculated T Axis 52 degrees    Diagnosis       Normal sinus rhythm  Septal infarct , age undetermined  Abnormal ECG  When compared with ECG of 19-JUL-2017 00:49,  Septal infarct is now present     LIPASE    Collection Time: 11/20/17  7:00 PM   Result Value Ref Range    Lipase 159 73 - 393 U/L   MAGNESIUM    Collection Time: 11/20/17  7:00 PM   Result Value Ref Range    Magnesium 3.5 (H) 1.6 - 2.6 mg/dL   METABOLIC PANEL, COMPREHENSIVE    Collection Time: 11/20/17  7:00 PM   Result Value Ref Range    Sodium 124 (L) 136 - 145 mmol/L Potassium 6.8 (HH) 3.5 - 5.5 mmol/L    Chloride 93 (L) 100 - 108 mmol/L    CO2 10 (L) 21 - 32 mmol/L    Anion gap 21 (H) 3.0 - 18 mmol/L    Glucose 129 (H) 74 - 99 mg/dL    BUN >150 (H) 7.0 - 18 MG/DL    Creatinine 7.39 (H) 0.6 - 1.3 MG/DL    BUN/Creatinine ratio Cannot be calculated 12 - 20      GFR est AA 9 (L) >60 ml/min/1.73m2    GFR est non-AA 8 (L) >60 ml/min/1.73m2    Calcium 8.7 8.5 - 10.1 MG/DL    Bilirubin, total 11.4 (H) 0.2 - 1.0 MG/DL    ALT (SGPT) 122 (H) 16 - 61 U/L    AST (SGOT) 286 (H) 15 - 37 U/L    Alk. phosphatase 1182 (H) 45 - 117 U/L    Protein, total 6.4 6.4 - 8.2 g/dL    Albumin 1.4 (L) 3.4 - 5.0 g/dL    Globulin 5.0 (H) 2.0 - 4.0 g/dL    A-G Ratio 0.3 (L) 0.8 - 1.7             No results for input(s): FIO2I, IFO2, HCO3I, IHCO3, HCOPOC, PCO2I, PCOPOC, IPHI, PHI, PHPOC, PO2I, PO2POC in the last 72 hours. No lab exists for component: IPOC2  Telemetry:normal sinus rhythm    Imaging:  I have personally reviewed the patients radiographs and have reviewed the reports:  Ct scan chest/abd- 10/2017  1. New multiple lung nodules with apparent interval increase in the size of the  right upper lobe nodule noted on 7/8/17.     2. Increased number and size of the liver mass compared to 7/8/17. There are  increased calcifications in the liver. Significance of this finding is unclear.     3. Increased ascites.     4. Increased fluid in the left inguinal canal, probably indicative of inguinal  hernia with increased fluid reflecting increased ascites.   CXR Results      11/20/2017  Basal steaky infiltrates         Best practice :    Glycemic control  IHI ICU bundles:  Sress ulcer prophylaxis  DVT prophylaxis  Need for Lines, zuniga assessed  Palliative care consult- needed       Central Line Bundle Followed   - [facility]     Total of  50   min critical care time spent at bedside during the course of care providing evaluation,management and care decisions and ordering appropriate treatment related to critical care problems exclusive of procedures. The reason for providing this level of medical care for this critically ill patient was due a critical illness that impaired one or more vital organ systems such that there was a high probability of imminent or life threatening deterioration in the patients condition. This care involved high complexity decision making to assess, manipulate, and support vital system functions, to treat this degree vital organ system failure and to prevent further life threatening deterioration of the patients condition.     Kirill Ramon MD

## 2017-11-21 NOTE — ROUTINE PROCESS
Bedside and Verbal shift change report given to JEFFREY Ochoa (oncoming nurse) by Sabiha Stratton RN (offgoing nurse). Report included the following information SBAR, Kardex, Intake/Output, MAR and Recent Results.

## 2017-11-21 NOTE — H&P
History & Physical    Patient: Justus Acosta MRN: [id number]  CSN: [id number]    YOB: 1965  Age: 47 y.o. Sex: male      DOA: 11/20/2017    Chief Complaint:   Chief Complaint   Patient presents with    Vomiting Blood          HPI:     Justus Acosta is a 47 y.o.  male with hx of metastatic colon cancer who comes to ER with Rectal bleeding and hematemesis that started early this morning . He had over 3 episodes of hematemesis and 2 episodes of rectal bleeding. He feels progressively weak and is having increase AscitesGets weekly paracentesis . Unfortuantely found to be hypotensive in ER with Acute renal failure and hyperkalemia. Patient was on eloquis for Pulmonary Embolism in ER given Kcentra to aide with reversal of eloquis . This and transfusion of Prbc resulted in improve BP  Patient currently on po chemo agent and with recent CT scan 10/26 showing progressive liver mets worsening and worsening pulmonary lesions . Followed by Douglas Lockhart (full code)    IN ER renal consulted not a canidate for dialysis due to blood pressure and advance cancer   Given Bicarb drip and calcium gluconate  Patient currently hemodynamically stable at time of my eval on octeodtide drip as well GI aware of patient           Past Medical History:   Diagnosis Date    Bleeding     Colon [facility] ([facility]     Diabetes ([facility]     HTN (hypertension)     [facility]        History reviewed. No pertinent surgical history.     Family History   Problem Relation Age of Onset    Cancer Father      lung       Social History     Social History    Marital status: SINGLE     Spouse name: N/A    Number of children: N/A    Years of education: N/A     Social History Main Topics    Smoking status: Former Smoker     Types: Cigarettes     Quit date: 3/16/2017    Smokeless tobacco: Never Used    Alcohol use No    Drug use: No    Sexual activity: Yes     Partners: Female     Other Topics Concern    None     Social History Narrative       Prior to Admission medications    Medication Sig Start Date End Date Taking? Authorizing Provider   LONSURF 20-8. 19 mg tab  9/27/17   Historical Provider   FREESTYLE LANCETS 28 gauge misc USE LANCET TO CHECK BLOOD GLUCOSE 4 TIMES DAILY 9/19/17   Historical Provider   HYDROcodone-acetaminophen (NORCO) 5-325 mg per tablet Take 1 Tab by mouth every six (6) hours as needed. 8/10/17   Historical Provider   insulin glargine (LANTUS,BASAGLAR) 100 unit/mL (3 mL) inpn 24 units at bedtime  Patient taking differently: 10 Units by SubCUTAneous route nightly. 24 units at bedtime 9/8/17   Sophie Anderson MD   insulin aspart (NOVOLOG FLEXPEN) 100 unit/mL inpn Use 8 units each meal 9/8/17   Sophie Anderson MD   BD INSULIN PEN NEEDLE UF SHORT 31 gauge x 5/16\" ndle Use 4 times a day 9/8/17   Sophie Anderson MD   VENTOLIN HFA 90 mcg/actuation inhaler inhale 2 puffs by mouth every 6 hours if needed 8/9/17   Historical Provider   zolpidem (AMBIEN) 5 mg tablet Take 1 Tab by mouth nightly as needed. 8/29/17   Historical Provider   apixaban (ELIQUIS) 5 mg tablet Take 5 mg by mouth two (2) times a day. Historical Provider   Blood-Glucose Meter monitoring kit Needs free style glucometer 8/22/17   Sophie Anderson MD   glucose blood VI test strips (FREESTYLE TEST) strip Check 3 times a day 4/21/17   Sophie Anderson MD   Lancets misc Check twice daily 4/18/17   Sophie Anderson MD   ferrous sulfate (IRON) 325 mg (65 mg iron) tablet Take 65 mg by mouth Daily (before breakfast). Historical Provider   acidophilus-pectin, citrus (ACIDOPHILUS PROBIOTIC) 100 million cell-10 mg cap Take  by mouth. Historical Provider   multivitamin (MEN'S MULTI-VITAMIN) tablet Take 1 Tab by mouth daily. Historical Provider       No Known Allergies      Review of Systems  GENERAL: Patient alert, awake and oriented times 3, able to communicate full sentences and not in distress.    HEENT: No change in vision, no earache, tinnitus, sore throat or sinus congestion. NECK: No pain or stiffness. PULMONARY: + shortness of breath, -cough or wheeze. Cardiovascular: no pnd or orthopnea, no CP  GASTROINTESTINAL: + abdominal pain, +nausea, vomiting or diarrhea, +melena or bright red blood per rectum. GENITOURINARY: No urinary frequency, urgency, hesitancy or dysuria. MUSCULOSKELETAL: No joint or muscle pain, no back pain, no recent trauma. DERMATOLOGIC: No rash, no itching, no lesions. ENDOCRINE: No polyuria, polydipsia, no heat or cold intolerance. No recent change in weight. HEMATOLOGICAL: +anemia or easy bruising or bleeding. NEUROLOGIC: No headache, seizures, numbness, tingling + weakness. Physical Exam:     Physical Exam:  Visit Vitals    BP (!) 84/60    Pulse 90    Temp 95.3 °F (35.2 °C)    Resp 14    Wt 70.3 kg (155 lb)    SpO2 100%    BMI 24.28 kg/m2      O2 Device: Room air    Temp (24hrs), Av.2 °F (35.1 °C), Min:95 °F (35 °C), Max:95.3 °F (35.2 °C)         0701 -  1900  In: -   Out: 500     General:  Alert, cooperative, no distress, appears stated age. Head: Normocephalic, without obvious abnormality, atraumatic. Eyes:  Conjunctivae/corneas clear. PERRL, EOMs intact. Nose: Nares normal. No drainage or sinus tenderness. Neck: Supple, symmetrical, trachea midline, no adenopathy, thyroid: no enlargement, no carotid bruit and no JVD. Lungs:   Clear to auscultation bilaterally. Heart:  Regular rate and rhythm, S1, S2 normal.     Abdomen: Soft, non-tender. Bowel sounds normal.    Extremities: Extremities normal, atraumatic, no cyanosis or edema. Pulses: 2+ and symmetric all extremities. Skin:  No rashes or lesions   Neurologic: AAOx3, No focal motor or sensory deficit. Labs Reviewed: All lab results for the last 24 hours reviewed.   Recent Results (from the past 24 hour(s))   CBC WITH AUTOMATED DIFF    Collection Time: 17  5:43 PM   Result Value Ref Range    WBC 13.7 (H) 4.6 - 13.2 K/uL    RBC 2.55 (L) 4.70 - 5.50 M/uL    HGB 8.5 (L) 13.0 - 16.0 g/dL    HCT 24.5 (L) 36.0 - 48.0 %    MCV 96.1 74.0 - 97.0 FL    MCH 33.3 24.0 - 34.0 PG    MCHC 34.7 31.0 - 37.0 g/dL    RDW 18.5 (H) 11.6 - 14.5 %    PLATELET 142 681 - 540 K/uL    MPV 10.9 9.2 - 11.8 FL    NEUTROPHILS 90 (H) 40 - 73 %    LYMPHOCYTES 7 (L) 21 - 52 %    MONOCYTES 3 3 - 10 %    EOSINOPHILS 0 0 - 5 %    BASOPHILS 0 0 - 2 %    ABS. NEUTROPHILS 12.3 (H) 1.8 - 8.0 K/UL    ABS. LYMPHOCYTES 1.0 0.9 - 3.6 K/UL    ABS. MONOCYTES 0.4 0.05 - 1.2 K/UL    ABS. EOSINOPHILS 0.0 0.0 - 0.4 K/UL    ABS.  BASOPHILS 0.0 0.0 - 0.1 K/UL    DF AUTOMATED     PROTHROMBIN TIME + INR    Collection Time: 11/20/17  5:43 PM   Result Value Ref Range    Prothrombin time 31.1 (H) 11.5 - 15.2 sec    INR 3.1 (H) 0.8 - 1.2     PTT    Collection Time: 11/20/17  5:43 PM   Result Value Ref Range    aPTT 49.4 (H) 23.0 - 36.4 SEC   EMERGENT RELEASE OF UNCROSSMATCHED RED CELLS    Collection Time: 11/20/17  5:43 PM   Result Value Ref Range    Crossmatch Expiration 11/23/2017     ABO/Rh(D) A POSITIVE     Antibody screen NEG     CALLED TO: LATISHA COLVIN, AT 18:46 ON 11/20/2017 BY [facility]     Unit number [id number]     Blood component type  LR AS1     Unit division 00     Status of unit TRANSFUSED     Crossmatch result Compatible     Unit number [id number]     Blood component type  LR AS1     Unit division 00     Status of unit ISSUED     Crossmatch result Compatible     Unit number [id number]     Blood component type  LR AS1     Unit division 00     Status of unit ALLOCATED     Crossmatch result Compatible    EKG, 12 LEAD, INITIAL    Collection Time: 11/20/17  6:47 PM   Result Value Ref Range    Ventricular Rate 89 BPM    Atrial Rate 89 BPM    P-R Interval 184 ms    QRS Duration 98 ms    Q-T Interval 392 ms    QTC Calculation (Bezet) 476 ms    Calculated P Axis 47 degrees    Calculated R Axis 24 degrees    Calculated T Axis 52 degrees    Diagnosis Normal sinus rhythm  Septal infarct , age undetermined  Abnormal ECG  When compared with ECG of 19-JUL-2017 00:49,  Septal infarct is now present     LIPASE    Collection Time: 11/20/17  7:00 PM   Result Value Ref Range    Lipase 159 73 - 393 U/L   MAGNESIUM    Collection Time: 11/20/17  7:00 PM   Result Value Ref Range    Magnesium 3.5 (H) 1.6 - 2.6 mg/dL   METABOLIC PANEL, COMPREHENSIVE    Collection Time: 11/20/17  7:00 PM   Result Value Ref Range    Sodium 124 (L) 136 - 145 mmol/L    Potassium 6.8 (HH) 3.5 - 5.5 mmol/L    Chloride 93 (L) 100 - 108 mmol/L    CO2 10 (L) 21 - 32 mmol/L    Anion gap 21 (H) 3.0 - 18 mmol/L    Glucose 129 (H) 74 - 99 mg/dL    BUN >150 (H) 7.0 - 18 MG/DL    Creatinine 7.39 (H) 0.6 - 1.3 MG/DL    BUN/Creatinine ratio Cannot be calculated 12 - 20      GFR est AA 9 (L) >60 ml/min/1.73m2    GFR est non-AA 8 (L) >60 ml/min/1.73m2    Calcium 8.7 8.5 - 10.1 MG/DL    Bilirubin, total 11.4 (H) 0.2 - 1.0 MG/DL    ALT (SGPT) 122 (H) 16 - 61 U/L    AST (SGOT) 286 (H) 15 - 37 U/L    Alk. phosphatase 1182 (H) 45 - 117 U/L    Protein, total 6.4 6.4 - 8.2 g/dL    Albumin 1.4 (L) 3.4 - 5.0 g/dL    Globulin 5.0 (H) 2.0 - 4.0 g/dL    A-G Ratio 0.3 (L) 0.8 - 1.7      and EKG    Procedures/imaging: see electronic medical records for all procedures/Xrays and details which were not copied into this note but were reviewed prior to creation of Plan    CT Results  (Last 48 hours)    None      reviwed from 10/26 worsening and new lung and liver masses  Worsening ascites   Assessment/Plan     Principal Problem:    Hypotension (11/20/2017)  Supportive care as needed     Active Problems:    Malignant neoplasm of colon (HCC)/ metastasis to liver. following Dr. Arjun Corey  (3/28/2017)  Hold  Lonsurf chemo agent now Contraindicated with kidney failure creatine clearance less than 30       Pulmonary embolus (HCC)/ post rt leg DVT/. post IVC filter .  on eliquis  (3/28/2017)  Holding for now received Jacqualine Arabella with improvement of bleeding       Type 2 diabetes mellitus with hyperglycemia, with long-term current use of insulin ([address] (4/18/2017)  Since NPO hold Lantus and Novolog with meals  Sliding scale only       Bleeding esophageal varices (HCC) (11/20/2017)  Gi consulted   Protonix and Octreotide drip  H and H q 6  Blood to keep a MAP of 65       Hyperkalemia (11/20/2017)  Given Bicarb/calcium gluconate  Not hemodialysis canidate  Miralx 17gm daily       Acute renal failure (ARF) ([address] (11/20/2017)  Hold Lonsurf (chemo agent)  Renal consult appreciated   No dialysis           GI bleed (11/20/2017)      Metastatic colorectal cancer ([address] (11/20/2017)  pallative care consult   Full code for now      Plan :      DVT/GI Prophylaxis: SCD's    Discussed with patient at bedside about hospital admission and my plan care, who understood and agree with my plan care.     Jeannie Brewster MD  11/20/2017 10:27 PM

## 2017-11-21 NOTE — ED NOTES
TRANSFER - OUT REPORT:    Verbal report given to Crys Mcgraw (name) on Ezzard Fallow  being transferred to ICU (unit) for routine progression of care       Report consisted of patients Situation, Background, Assessment and   Recommendations(SBAR). Information from the following report(s) SBAR, ED Summary and MAR was reviewed with the receiving nurse. Lines:   Venous Access Device Accessed Mediport 11/20/17 Upper chest (subclavicular area, right (Active)       Peripheral IV 11/20/17 Left Antecubital (Active)   Site Assessment Clean, dry, & intact 11/20/2017  5:47 PM   Phlebitis Assessment 0 11/20/2017  5:47 PM   Infiltration Assessment 0 11/20/2017  5:47 PM   Dressing Status Clean, dry, & intact 11/20/2017  5:47 PM   Dressing Type Tape;Transparent 11/20/2017  5:47 PM   Hub Color/Line Status Pink;Flushed;Patent 11/20/2017  5:47 PM   Action Taken Blood drawn 11/20/2017  5:47 PM       Peripheral IV 11/20/17 Right Arm (Active)   Site Assessment Clean, dry, & intact 11/20/2017  7:24 PM   Phlebitis Assessment 0 11/20/2017  7:24 PM   Infiltration Assessment 0 11/20/2017  7:24 PM   Dressing Status Clean, dry, & intact 11/20/2017  7:24 PM   Dressing Type Tape;Transparent 11/20/2017  7:24 PM   Hub Color/Line Status Blue;Flushed;Patent 11/20/2017  7:24 PM   Action Taken Blood drawn 11/20/2017  7:24 PM        Opportunity for questions and clarification was provided.       Patient transported with:   Monitor  Registered Nurse

## 2017-11-21 NOTE — PROGRESS NOTES
Phone: [phone]     Hematology / Oncology Progress Note  [facility]      Patient: Portia Purcell   MRN: [id number]         CSN: [id number]    YOB: 1965      Admit Date: 2017    Assessment:     Principal Problem:    Hypotension (2017)    Active Problems:    Malignant neoplasm of colon (HCC)/ metastasis to liver. following Dr. Rena Marcelo  (3/28/2017)      Pulmonary embolus (HCC)/ post rt leg DVT/. post IVC filter . on eliquis  (3/28/2017)      Type 2 diabetes mellitus with hyperglycemia, with long-term current use of insulin ([address] (2017)      Bleeding esophageal varices ([address] (2017)      Hyperkalemia (2017)      Acute renal failure (ARF) ([address] (2017)      Renal failure (2017)      GI bleed (2017)      Metastatic colorectal cancer ([facility] [address] (2017)    malignant , on wkly paracentesis  malnutrition    Plan:     Progressive disease with steady decline in condition over last few months- malignancy,  on fourth line therapy with Lonsurf. Hepatorenal syndrome with extensive liver mets. prbc support given. Prognosis poor, terminal situation . Would not recommend aggressive therapeutic interventions given his illness and futility. Previous attempts to discuss end of life care and put  Resources in place have been turned down. I will contact Carl Huber  and palliative care staff to meet with pt and girlfriend Carl Huber, care giver . Code status to be reviewed asap. D/w ICU staff.     Abhijeet Landaverde MD  [facility] [phone]      Subjective:     Tired, leg pain    Objective:     Visit Vitals    BP (!) 80/61    Pulse 88    Temp 97.6 °F (36.4 °C)    Resp 13    Wt 69.2 kg (152 lb 9.6 oz)    SpO2 100%    BMI 23.9 kg/m2             Temp (24hrs), Av °F (35.6 °C), Min:95 °F (35 °C), Max:97.6 °F (36.4 °C)        Intake/Output Summary (Last 24 hours) at 17 0846  Last data filed at 17 1800   Gross per 24 hour Intake                0 ml   Output              500 ml   Net             -500 ml     Review of Systems:   Could not be obtained      Physical Exam:  Constitutional: Alert, oriented to place and person , in distress from leg pain  Eyes: jaundiced, pale  Ears, nose, mouth, throat, and face: bitemporal wasting  Respiratory:  poor insp effort  Cardiovascular: S1S 2 tachycardia, martha leg edema  Gastrointestinal: sascites  Integument:icteric, pale  Musculoskeletal: wasting  Neurological:alert, answers questions    Labs:  Recent Results (from the past 24 hour(s))   CBC WITH AUTOMATED DIFF    Collection Time: 11/20/17  5:43 PM   Result Value Ref Range    WBC 13.7 (H) 4.6 - 13.2 K/uL    RBC 2.55 (L) 4.70 - 5.50 M/uL    HGB 8.5 (L) 13.0 - 16.0 g/dL    HCT 24.5 (L) 36.0 - 48.0 %    MCV 96.1 74.0 - 97.0 FL    MCH 33.3 24.0 - 34.0 PG    MCHC 34.7 31.0 - 37.0 g/dL    RDW 18.5 (H) 11.6 - 14.5 %    PLATELET 437 587 - 369 K/uL    MPV 10.9 9.2 - 11.8 FL    NEUTROPHILS 90 (H) 40 - 73 %    LYMPHOCYTES 7 (L) 21 - 52 %    MONOCYTES 3 3 - 10 %    EOSINOPHILS 0 0 - 5 %    BASOPHILS 0 0 - 2 %    ABS. NEUTROPHILS 12.3 (H) 1.8 - 8.0 K/UL    ABS. LYMPHOCYTES 1.0 0.9 - 3.6 K/UL    ABS. MONOCYTES 0.4 0.05 - 1.2 K/UL    ABS. EOSINOPHILS 0.0 0.0 - 0.4 K/UL    ABS.  BASOPHILS 0.0 0.0 - 0.1 K/UL    DF AUTOMATED     PROTHROMBIN TIME + INR    Collection Time: 11/20/17  5:43 PM   Result Value Ref Range    Prothrombin time 31.1 (H) 11.5 - 15.2 sec    INR 3.1 (H) 0.8 - 1.2     PTT    Collection Time: 11/20/17  5:43 PM   Result Value Ref Range    aPTT 49.4 (H) 23.0 - 36.4 SEC   EMERGENT RELEASE OF UNCROSSMATCHED RED CELLS    Collection Time: 11/20/17  5:43 PM   Result Value Ref Range    Crossmatch Expiration 11/23/2017     ABO/Rh(D) A POSITIVE     Antibody screen NEG     CALLED TO: LATISHA COLVIN, AT 18:46 ON 11/20/2017 BY [facility]     Unit number [id number]     Blood component type RC LR AS1     Unit division 00     Status of unit TRANSFUSED     Crossmatch result Compatible     Unit number [id number]     Blood component type RC LR AS1     Unit division 00     Status of unit TRANSFUSED     Crossmatch result Compatible     Unit number [id number]     Blood component type RC LR AS1     Unit division 00     Status of unit ALLOCATED     Crossmatch result Compatible    HEMOGLOBIN A1C WITH EAG    Collection Time: 11/20/17  5:43 PM   Result Value Ref Range    Hemoglobin A1c 4.7 4.2 - 5.6 %    Est. average glucose Cannot be calculated mg/dL   EKG, 12 LEAD, INITIAL    Collection Time: 11/20/17  6:47 PM   Result Value Ref Range    Ventricular Rate 89 BPM    Atrial Rate 89 BPM    P-R Interval 184 ms    QRS Duration 98 ms    Q-T Interval 392 ms    QTC Calculation (Bezet) 476 ms    Calculated P Axis 47 degrees    Calculated R Axis 24 degrees    Calculated T Axis 52 degrees    Diagnosis       Normal sinus rhythm  Septal infarct , age undetermined  Abnormal ECG  When compared with ECG of 19-JUL-2017 00:49,  Septal infarct is now present  Confirmed by Mary Ann uQiroz MD, Mariia Myrick ([id number]) on 11/21/2017 8:18:58 AM     LIPASE    Collection Time: 11/20/17  7:00 PM   Result Value Ref Range    Lipase 159 73 - 393 U/L   MAGNESIUM    Collection Time: 11/20/17  7:00 PM   Result Value Ref Range    Magnesium 3.5 (H) 1.6 - 2.6 mg/dL   METABOLIC PANEL, COMPREHENSIVE    Collection Time: 11/20/17  7:00 PM   Result Value Ref Range    Sodium 124 (L) 136 - 145 mmol/L    Potassium 6.8 (HH) 3.5 - 5.5 mmol/L    Chloride 93 (L) 100 - 108 mmol/L    CO2 10 (L) 21 - 32 mmol/L    Anion gap 21 (H) 3.0 - 18 mmol/L    Glucose 129 (H) 74 - 99 mg/dL    BUN >150 (H) 7.0 - 18 MG/DL    Creatinine 7.39 (H) 0.6 - 1.3 MG/DL    BUN/Creatinine ratio Cannot be calculated 12 - 20      GFR est AA 9 (L) >60 ml/min/1.73m2    GFR est non-AA 8 (L) >60 ml/min/1.73m2    Calcium 8.7 8.5 - 10.1 MG/DL    Bilirubin, total 11.4 (H) 0.2 - 1.0 MG/DL    ALT (SGPT) 122 (H) 16 - 61 U/L    AST (SGOT) 286 (H) 15 - 37 U/L    Alk.  phosphatase 1182 (H) 45 - 117 U/L    Protein, total 6.4 6.4 - 8.2 g/dL    Albumin 1.4 (L) 3.4 - 5.0 g/dL    Globulin 5.0 (H) 2.0 - 4.0 g/dL    A-G Ratio 0.3 (L) 0.8 - 1.7     CBC WITH AUTOMATED DIFF    Collection Time: 11/21/17 12:53 AM   Result Value Ref Range    WBC 14.8 (H) 4.6 - 13.2 K/uL    RBC 3.50 (L) 4.70 - 5.50 M/uL    HGB 11.1 (L) 13.0 - 16.0 g/dL    HCT 31.4 (L) 36.0 - 48.0 %    MCV 89.7 74.0 - 97.0 FL    MCH 31.7 24.0 - 34.0 PG    MCHC 35.4 31.0 - 37.0 g/dL    RDW 18.9 (H) 11.6 - 14.5 %    PLATELET 910 (L) 316 - 420 K/uL    MPV 10.9 9.2 - 11.8 FL    NEUTROPHILS 83 (H) 42 - 75 %    BAND NEUTROPHILS 5 0 - 5 %    LYMPHOCYTES 6 (L) 20 - 51 %    MONOCYTES 5 2 - 9 %    EOSINOPHILS 0 0 - 5 %    BASOPHILS 1 0 - 3 %    NRBC 1.0 (H) 0  WBC    ABS. NEUTROPHILS 13.1 (H) 1.8 - 8.0 K/UL    ABS. LYMPHOCYTES 0.9 0.8 - 3.5 K/UL    ABS. MONOCYTES 0.7 0 - 1.0 K/UL    ABS. EOSINOPHILS 0.0 0.0 - 0.4 K/UL    ABS. BASOPHILS 0.1 (H) 0.0 - 0.06 K/UL    DF MANUAL      PLATELET COMMENTS ADEQUATE PLATELETS      RBC COMMENTS ANISOCYTOSIS  2+        RBC COMMENTS TARGET CELLS  1+        RBC COMMENTS Dimorphic RBCs  1+       GLUCOSE, POC    Collection Time: 11/21/17  3:34 AM   Result Value Ref Range    Glucose (POC) 139 (H) 70 - 496 mg/dL   METABOLIC PANEL, COMPREHENSIVE    Collection Time: 11/21/17  3:38 AM   Result Value Ref Range    Sodium 129 (L) 136 - 145 mmol/L    Potassium 6.1 (HH) 3.5 - 5.5 mmol/L    Chloride 99 (L) 100 - 108 mmol/L    CO2 12 (L) 21 - 32 mmol/L    Anion gap 18 3.0 - 18 mmol/L    Glucose 128 (H) 74 - 99 mg/dL     (H) 7.0 - 18 MG/DL    Creatinine 6.69 (H) 0.6 - 1.3 MG/DL    BUN/Creatinine ratio 28 (H) 12 - 20      GFR est AA 11 (L) >60 ml/min/1.73m2    GFR est non-AA 9 (L) >60 ml/min/1.73m2    Calcium 8.2 (L) 8.5 - 10.1 MG/DL    Bilirubin, total 11.0 (H) 0.2 - 1.0 MG/DL    ALT (SGPT) 105 (H) 16 - 61 U/L    AST (SGOT) 233 (H) 15 - 37 U/L    Alk.  phosphatase 959 (H) 45 - 117 U/L    Protein, total 5.4 (L) 6.4 - 8.2 g/dL Albumin 1.2 (L) 3.4 - 5.0 g/dL    Globulin 4.2 (H) 2.0 - 4.0 g/dL    A-G Ratio 0.3 (L) 0.8 - 1.7     PROTHROMBIN TIME + INR    Collection Time: 11/21/17  3:38 AM   Result Value Ref Range    Prothrombin time 17.1 (H) 11.5 - 15.2 sec    INR 1.5 (H) 0.8 - 1.2     AMMONIA    Collection Time: 11/21/17  3:38 AM   Result Value Ref Range    Ammonia 52 (H) 11 - 32 UMOL/L   CBC WITH AUTOMATED DIFF    Collection Time: 11/21/17  3:38 AM   Result Value Ref Range    WBC 14.1 (H) 4.6 - 13.2 K/uL    RBC 3.35 (L) 4.70 - 5.50 M/uL    HGB 10.9 (L) 13.0 - 16.0 g/dL    HCT 30.1 (L) 36.0 - 48.0 %    MCV 89.9 74.0 - 97.0 FL    MCH 32.5 24.0 - 34.0 PG    MCHC 36.2 31.0 - 37.0 g/dL    RDW 19.2 (H) 11.6 - 14.5 %    PLATELET 255 (L) 624 - 420 K/uL    MPV 11.5 9.2 - 11.8 FL    NEUTROPHILS 91 (H) 40 - 73 %    LYMPHOCYTES 6 (L) 21 - 52 %    MONOCYTES 3 3 - 10 %    EOSINOPHILS 0 0 - 5 %    BASOPHILS 0 0 - 2 %    ABS. NEUTROPHILS 12.8 (H) 1.8 - 8.0 K/UL    ABS. LYMPHOCYTES 0.9 0.9 - 3.6 K/UL    ABS. MONOCYTES 0.4 0.05 - 1.2 K/UL    ABS. EOSINOPHILS 0.0 0.0 - 0.4 K/UL    ABS.  BASOPHILS 0.0 0.0 - 0.1 K/UL    DF AUTOMATED     GLUCOSE, POC    Collection Time: 11/21/17  6:20 AM   Result Value Ref Range    Glucose (POC) 135 (H) 70 - 110 mg/dL

## 2017-11-22 NOTE — ROUTINE PROCESS
0715 Bedside and Verbal shift change report given to [employer] (oncoming nurse) by Jeb Naidu. Basim Perez (offgoing nurse). Report included the following information SBAR, Kardex, MAR and Recent Results.

## 2017-11-22 NOTE — PROGRESS NOTES
NUTRITION    Nursing Referral: [facility]     RECOMMENDATIONS / PLAN:     - Continue current diet order to encourage meal intake. - Add Magic Cup, TID.  - Monitor meal and supplement intake. Encourage intake. - If appetite remains poor, recommend discussing Marinol with MD.  - Continue RD inpatient monitoring and evaluation. NUTRITION INTERVENTIONS & DIAGNOSIS:     [x] Meals/Snacks: general/healthful diet. [x] Medical food supplementation: Initiate   [x] Coordination of care: interdisciplinary rounds, discussed pt with RN. Nutrition Diagnosis: Unintentional weight loss related to inadequate energy intake as evidenced by 15 lb, 9% weight loss in the past 1-2 weeks. Patient meets criteria for Severe Protein Calorie Malnutrition as evidenced by:   ASPEN Malnutrition Criteria  Acute Illness, Chronic Illness, or Social/Enviornmental: Acute illness  Energy Intake: Less than/equal to 50% est energy req for greater than/equal to 5 days  Weight Loss: Greater than 5% x 1 mo  Body Fat: Moderate (orbital region)  Muscle Mass: Moderate (temple region, clavicle region)  ASPEN Malnutrition Score - Acute Illness: 24  Acute Illness - Malnutrition Diagnosis: Severe malnutrition. ASSESSMENT:     11/22: Pt very lethargic during time of visit. Diet started. Poor appetite. Discussed starting supplement with RN and pt's family, both agreeable. Hyponatremia noted, MD to hold off on intervention for now. 11/21: Pt reports poor meal intake for the past several weeks and significant weight loss. Asking for food. Nutrition focused physical exam completed. Worsening renal function. NPO with GI bleed.       Average po intake adequate to meet patients estimated nutritional needs:   [] Yes     [x] No   [] Unable to determine at this time    Diet: DIET REGULAR      Food Allergies: NKFA  Current Appetite:   [] Good     [] Fair     [x] Poor     [] Other  Appetite/meal intake prior to admission:   [] Good     [] Fair     [x] Poor x several weeks    [] Other:  Feeding Limitations:  [] Swallowing difficulty    [] Chewing difficulty    [] Other:  Current Meal Intake: No data found. BM: 11/22   Skin Integrity: WDL  Edema: 1+ UEs, 2+ LEs  Pertinent Medications: Reviewed: levophed, colace, SSI, zofran    Recent Labs      11/22/17   0213  11/21/17   1017  11/21/17   0338  11/20/17   1900   NA  131*  129*  129*  124*   K  5.3  5.6*  6.1*  6.8*   CL  99*  99*  99*  93*   CO2  13*  12*  12*  10*   GLU  129*  143*  128*  129*   BUN  175*  182*  189*  >150*   CREA  6.55*  6.76*  6.69*  7.39*   CA  8.0*  8.1*  8.2*  8.7   MG   --    --    --   3.5*   ALB  1.2*   --   1.2*  1.4*   SGOT  264*   --   233*  286*   ALT  117*   --   105*  122*       Intake/Output Summary (Last 24 hours) at 11/22/17 1226  Last data filed at 11/22/17 0700   Gross per 24 hour   Intake           3503.9 ml   Output                0 ml   Net           3503.9 ml       Anthropometrics:  Ht Readings from Last 1 Encounters:   11/21/17 5' 7\" (1.702 m)     Last 3 Recorded Weights in this Encounter    11/20/17 1815 11/21/17 0500 11/22/17 0400   Weight: 70.3 kg (155 lb) 69.2 kg (152 lb 9.6 oz) 70.4 kg (155 lb 3.3 oz)     Body mass index is 24.31 kg/(m^2).           Weight History: patient reports 15 lb, 9% weight loss in the past 1-2 weeks     Weight Metrics 11/22/2017 9/29/2017 9/8/2017 8/10/2017 7/18/2017 4/21/2017 3/28/2017   Weight 155 lb 3.3 oz 157 lb 162 lb 9.6 oz 161 lb 4 oz 150 lb 151 lb 158 lb 12.8 oz   BMI 24.31 kg/m2 24.59 kg/m2 25.47 kg/m2 25.26 kg/m2 23.49 kg/m2 24.01 kg/m2 25.25 kg/m2        Admitting Diagnosis: Acute renal failure (ARF) (HCC)  Bleeding esophageal varices (HCC)  Hyperkalemia  Hypotension  GI bleed  Hypotension  Renal failure  Hyperkalemia  Metastatic colorectal cancer ([facility] [address]  Pertinent PMHx: colon cancer, DM, HTN, liver cancer     Education Needs:        [x] None identified  [] Identified - Not appropriate at this time  []  Identified and addressed - refer to education log  Learning Limitations:   [x] None identified  [] Identified    Cultural, Alevism & ethnic food preferences:  [x] None identified    [] Identified and addressed     ESTIMATED NUTRITION NEEDS:     Calories: 9706-3630 kcal (HBEx1.3-1.5) based on  [x] Actual BW 70 kg     [] IBW   Protein:  gm (1-1.5 gm/kg) based on  [x] Actual BW      [] IBW   Fluid: 1 mL/kcal     MONITORING & EVALUATION:     Nutrition Goal(s):   1. Po intake of meals will meet >75% of patient estimated nutritional needs within the next 7 days.   Outcome:  [] Met/Ongoing    [x]  Not Met    [] New/Initial Goal     Monitoring:   [x] Diet tolerance   [x] Meal intake   [x] Supplement intake   [] GI symptoms/ability to tolerate po diet   [] Respiratory status   [x] Plan of care      Previous Recommendations (for follow-up assessments only):     [x]   Implemented       []   Not Implemented (RD to address)      [] No Longer Appropriate     [] No Recommendation Made     Discharge Planning: cardiac, diabetic diet   [x] Participated in care planning, discharge planning, & interdisciplinary rounds as appropriate      Fidelina Dunn RD   Pager: [phone]

## 2017-11-22 NOTE — PROGRESS NOTES
RENAL DAILY PROGRESS NOTE    Patient: Laura Gallego               Sex: male          DOA: 11/20/2017  4:44 PM        YOB: 1965      Age:  47 y.o.        LOS:  LOS: 1 day     Subjective:     Laura Gallego is a 47 y.o.  who presents with Acute renal failure (ARF) (HCC)  Bleeding esophageal varices (HCC)  Hyperkalemia  Hypotension  GI bleed  Hypotension  Renal failure  Hyperkalemia  Metastatic colorectal cancer ([address] Asked to evaluate for arf. pt was admitted with hypotension,gi bleed,arf ,hyperkalemia. hx of metastatic colon cancer,liver failure,ascites  Chief complains:  - Reviewed last 24 hrs events     Current Facility-Administered Medications   Medication Dose Route Frequency    zolpidem (AMBIEN) tablet 10 mg  10 mg Oral ONCE    pantoprazole (PROTONIX) 40 mg in sodium chloride 0.9 % 10 mL injection  40 mg IntraVENous Q12H    NOREPINephrine (LEVOPHED) 8 mg in 5% dextrose 250mL infusion  2-16 mcg/min IntraVENous TITRATE    sodium bicarbonate (8.4%) 100 mEq in dextrose 5% 1,000 mL infusion   IntraVENous CONTINUOUS    octreotide (SANDOSTATIN) 500 mcg in 0.9% sodium chloride 500 mL infusion  50 mcg/hr IntraVENous CONTINUOUS    0.9% sodium chloride infusion 250 mL  250 mL IntraVENous PRN    0.9% sodium chloride infusion 250 mL  250 mL IntraVENous PRN    dextrose (D50W) injection syrg 12.5-25 g  25-50 mL IntraVENous PRN    insulin lispro (HUMALOG) injection   SubCUTAneous AC&HS    glucose chewable tablet 16 g  4 Tab Oral PRN    glucagon (GLUCAGEN) injection 1 mg  1 mg IntraMUSCular PRN    dextrose (D50W) injection syrg 12.5-25 g  25-50 mL IntraVENous PRN    sodium chloride (NS) flush 5-10 mL  5-10 mL IntraVENous Q8H    sodium chloride (NS) flush 5-10 mL  5-10 mL IntraVENous PRN    oxyCODONE-acetaminophen (PERCOCET 7.5) 7.5-325 mg per tablet 1 Tab  1 Tab Oral Q4H PRN    naloxone (NARCAN) injection 0.4 mg  0.4 mg IntraVENous PRN    diphenhydrAMINE (BENADRYL) injection 12.5 mg  12.5 mg IntraVENous Q4H PRN    ondansetron (ZOFRAN) injection 4 mg  4 mg IntraVENous Q4H PRN    docusate sodium (COLACE) capsule 100 mg  100 mg Oral BID    sodium chloride (NS) flush 5-10 mL  5-10 mL IntraVENous Q8H    sodium chloride (NS) flush 5-10 mL  5-10 mL IntraVENous PRN       Objective:     Visit Vitals    BP 95/64    Pulse 99    Temp 97.3 °F (36.3 °C)    Resp 14    Ht 5' 7\" (1.702 m)    Wt 70.4 kg (155 lb 3.3 oz)    SpO2 100%    BMI 24.31 kg/m2       Intake/Output Summary (Last 24 hours) at 11/22/17 1002  Last data filed at 11/22/17 0700   Gross per 24 hour   Intake           3930.1 ml   Output               40 ml   Net           3890.1 ml       Physical Examination:     GEN: Awake  RS: Chest is bilateral equal, no wheezing / rales / crackles  CVS: S1-S2 heard, RRR, No S3 / murmur  Abdomen: ascites  Extremities: + edema, no cyanosis, skin is warm on touch  HEENT: Head is atraumatic, PERRLA, conjunctiva pink & non icteric. No JVD or carotid bruit    Musculoskeletal: No gross joints or bone deformities     Data Review:      Labs:     Hematology: Recent Labs      11/22/17   0213  11/21/17   1830  11/21/17   1200  11/21/17   0338  11/21/17   0053  11/20/17   1743   WBC   --    --    --   14.1*  14.8*  13.7*   HGB  9.8*  11.1*  10.5*  10.9*  11.1*  8.5*   HCT  27.4*  31.3*  30.4*  30.1*  31.4*  24.5*     Chemistry: Recent Labs      11/22/17   0213  11/21/17   1017  11/21/17   0338  11/20/17   1900   BUN  175*  182*  189*  >150*   CREA  6.55*  6.76*  6.69*  7.39*   CA  8.0*  8.1*  8.2*  8.7   ALB  1.2*   --   1.2*  1.4*   K  5.3  5.6*  6.1*  6.8*   NA  131*  129*  129*  124*   CL  99*  99*  99*  93*   CO2  13*  12*  12*  10*   GLU  129*  143*  128*  129*        Images:    XR (Most Recent).  CXR reviewed by me and compared with previous CXR   Results from Hospital Encounter encounter on 11/20/17   XR CHEST SNGL V   Narrative EXAM: Chest X-Ray    INDICATION: Weakness    TECHNIQUE: AP view of the chest    COMPARISON: 7/19/2017 and CT dated 11/7/2017    FINDINGS:   Tubes and Lines: There is an unchanged right-sided Mediport with tip overlying  the SVC. Pleura: No pneumothorax appreciated. No effusions appreciated. Lungs:  Linear changes noted within the lung similar to prior imaging. This is  likely a chronic finding. There is suggestion of a nodule in the left upper lung  field which may correspond with CT findings. Additional nodular changes in the  lungs are not as well appreciated as on prior CT. Cardiac silhouette: It is unremarkable    Pulmonary Vascularity: The pulmonary vasculature is unremarkable. Osseous Structures: Unremarkable         Impression IMPRESSION:  1. Chronic lung changes and likely nodular metastases that are better seen on  recent CT. 2.  Mediport in good position. CT (Most Recent)   Results from Hospital Encounter encounter on 11/07/17   CT CHEST ABD PELV WO CONT   Narrative PROCEDURE:  CT Chest, Abdomen, Pelvis without Contrast.             INDICATION:  Colon CA, chemotherapy for liver metastasis, follow-up for  treatment response C18.4, C78.7             COMPARISON:  7/8/17. TECHNIQUE:  Helically scanned volumetric 5 mm axial sections of the chest,  abdomen and pelvis are obtained without IV contrast administration (utilizing  only oral contrast). Coronal and sagittal reconstruction images are generated  to improve anatomic delineation.    - IV Contrast Dose:  100 mL Isovue-300. - Radiation Dose:   mGy-cm.  - Dose Reduction:     FINDINGS:    CT Chest    Lungs, Pleura:      - Multiple new scattered lung nodules are observed in both lungs in addition to  the previously observed right upper lobe posterior aspect nodule. - The 0.4 cm nodule noted on 7/8/17 is now 0.7 cm (axial #17). Multiple smaller  adjacent nodules. This is the largest nodule in the right upper lobe. The  largest nodule in the right middle lobe is 0.6 cm (axial #27). The largest  nodule in the right lower lobe is 0.6 cm (axial #22). The largest nodule in the  left upper lobe is 0.6 cm (axial #8). The largest left lower lobe nodule  measures about 0.6 cm (axial #19). - In addition to these new or enlarged scattered lung nodules, that are  scattered foci of irregular poorly circumscribed densities suggest the left  upper lobe lingular region focal density with stellate irregular borders,  measuring 1.0 x 0.9 cm. This focus appears unchanged compared to 7/8/17, favor  scar tissue.  - Multiple additional foci of scarring in both lungs. - No new infiltrate or consolidation. No significant pleural effusion. Mediastinum: No adenopathy. Meagan: No adenopathy. Vascular:  Atypical degree of ascending aorta dilation, measuring up to about  4.2 cm. Status post right IJ approach] single chamber infusion port placement. Base of Neck: The visualized base of the neck is unremarkable. Axillae: No evidence of significant axillary adenopathy. Esophagus:  Unremarkable. CT Abdomen    Liver: The lack of IV contrast limits the ability to compare the previous and  current study [id number]. There does appear to be generalized increase in the  number and size of hepatic lesions compared to 7/8/17. As a point of  comparison, the left lobe of the liver lesion bounded by the gallbladder fossa  and the umbilical fissure demonstrates multiple new hypodense hepatic masses  which were not as obvious or numerous as on 7/8/17. The largest mass abutting  the umbilical fissure currently measures about 5.4 x 2.8 cm. Previously only  subtle hypodensities were apparent in this region on 7/8/17. Of note, some of  the confluent masses show developing calcifications (axial #37, #47). Gallbladder:  Contracted. Multiple small gallstones are observed. Spleen:  Mildly enlarged spleen. Pancreas, Adrenal Glands, Kidneys:  Unremarkable.       Gastrointestinal Tract:      - Stomach:  Unremarkable. - Small Bowel:  Unremarkable. - Appendix:  No evidence of acute appendicitis. - Colon:  No acute findings. Peritoneal cavity:  Moderate ascites. Distinctly increased in the extent of  ascites compared to 7/8/17. Retroperitoneum:  No adenopathy. IVC filter in place. Vascular:  No aneurysm. CT Pelvis    Bladder:  Partially contracted. Grossly unremarkable. Rectum:  Unremarkable. Pelvic Sidewall:  Multiple small nodes in the groin regions bilaterally. Apparent left inguinal hernia with protrusion of the ascites into the left  inguinal canal.    Prostate:  Grossly unremarkable. Skeletal Structures:  No osteoblastic or osteolytic changes are detected. Impression IMPRESSION:            1. New multiple lung nodules with apparent interval increase in the size of the  right upper lobe nodule noted on 7/8/17. 2.  Increased number and size of the liver mass compared to 7/8/17. There are  increased calcifications in the liver. Significance of this finding is unclear. 3.  Increased ascites. 4.  Increased fluid in the left inguinal canal, probably indicative of inguinal  hernia with increased fluid reflecting increased ascites. EKG No results found for this or any previous visit. I have personally reviewed the old medical records and patient's labs    Plan / Recommendation:      1. Arf,no improvement,not a candidate for dialysis considering metastatic colon cancer,liver failure,hypotension. palliative care team are talking to pt and family  2.hyperkalemia,corrected  3.metabolic acidosis ,correcting slowly on iv bicarb drip    D/w Dr. Celi Cash MD  Nephrology  11/22/2017

## 2017-11-22 NOTE — PROGRESS NOTES
Subjective:  Symptoms:  Stable. No shortness of breath or chest pain. Diet:  Poor intake. Activity level: Impaired due to weakness. Patient awake but level of orientation questionable -denies pain or dyspnea when prompted but only answer with simple on-two words then turns head away as if to sleep  Family discussions with palliative care as noted  No other events noted overnight    Temp:  [95 °F (35 °C)-97.9 °F (36.6 °C)]   Pulse (Heart Rate):  []   BP: ()/()   Resp Rate:  [8-21]   O2 Sat (%):  [97 %-100 %]   Weight:  [69.2 kg (152 lb 9.6 oz)-70.4 kg (155 lb 3.3 oz)]      11/20 1901 - 11/22 0700  In: 4782.5 [I.V.:4782.5]  Out: 40 [Urine:40]      Objective:  General Appearance: In no acute distress and ill-appearing. Vital signs: (most recent): Blood pressure 95/64, pulse 99, temperature 97.3 °F (36.3 °C), resp. rate 14, height 5' 7\" (1.702 m), weight 70.4 kg (155 lb 3.3 oz), SpO2 100 %. Output: Minimal urine output and minimal stool output. Lungs:  Normal respiratory rate. There are decreased breath sounds. Heart: Tachycardia. Neurological: (Subdued). Abdomen: Abdomen is soft and distended. Hyperactive bowel sounds.        Recent Results (from the past 24 hour(s))   HGB & HCT    Collection Time: 11/21/17 12:00 PM   Result Value Ref Range    HGB 10.5 (L) 13.0 - 16.0 g/dL    HCT 30.4 (L) 36.0 - 48.0 %   GLUCOSE, POC    Collection Time: 11/21/17 12:19 PM   Result Value Ref Range    Glucose (POC) 98 70 - 110 mg/dL   EKG, 12 LEAD, SUBSEQUENT    Collection Time: 11/21/17  1:49 PM   Result Value Ref Range    Ventricular Rate 101 BPM    Atrial Rate 101 BPM    P-R Interval 168 ms    QRS Duration 78 ms    Q-T Interval 352 ms    QTC Calculation (Bezet) 456 ms    Calculated P Axis 47 degrees    Calculated R Axis 32 degrees    Calculated T Axis 51 degrees    Diagnosis       Sinus tachycardia  Low voltage QRS  Nonspecific T wave abnormality  Abnormal ECG  When compared with ECG of 20-NOV-2017 18:47,  Criteria for Septal infarct are no longer present  Confirmed by Denise Kramer MD, Dariel Grayson ([id number]) on 11/21/2017 4:08:17 PM     HGB & HCT    Collection Time: 11/21/17  6:30 PM   Result Value Ref Range    HGB 11.1 (L) 13.0 - 16.0 g/dL    HCT 31.3 (L) 36.0 - 48.0 %   GLUCOSE, POC    Collection Time: 11/21/17  6:36 PM   Result Value Ref Range    Glucose (POC) 89 70 - 110 mg/dL   GLUCOSE, POC    Collection Time: 11/21/17 11:07 PM   Result Value Ref Range    Glucose (POC) 104 70 - 972 mg/dL   METABOLIC PANEL, BASIC    Collection Time: 11/22/17  2:13 AM   Result Value Ref Range    Sodium 131 (L) 136 - 145 mmol/L    Potassium 5.3 3.5 - 5.5 mmol/L    Chloride 99 (L) 100 - 108 mmol/L    CO2 13 (L) 21 - 32 mmol/L    Anion gap 19 (H) 3.0 - 18 mmol/L    Glucose 129 (H) 74 - 99 mg/dL     (H) 7.0 - 18 MG/DL    Creatinine 6.55 (H) 0.6 - 1.3 MG/DL    BUN/Creatinine ratio 27 (H) 12 - 20      GFR est AA 11 (L) >60 ml/min/1.73m2    GFR est non-AA 9 (L) >60 ml/min/1.73m2    Calcium 8.0 (L) 8.5 - 10.1 MG/DL   HGB & HCT    Collection Time: 11/22/17  2:13 AM   Result Value Ref Range    HGB 9.8 (L) 13.0 - 16.0 g/dL    HCT 27.4 (L) 36.0 - 48.0 %   HEPATIC FUNCTION PANEL    Collection Time: 11/22/17  2:13 AM   Result Value Ref Range    Protein, total 5.3 (L) 6.4 - 8.2 g/dL    Albumin 1.2 (L) 3.4 - 5.0 g/dL    Globulin 4.1 (H) 2.0 - 4.0 g/dL    A-G Ratio 0.3 (L) 0.8 - 1.7      Bilirubin, total 11.7 (H) 0.2 - 1.0 MG/DL    Bilirubin, direct 10.2 (H) 0.0 - 0.2 MG/DL    Alk. phosphatase 967 (H) 45 - 117 U/L    AST (SGOT) 264 (H) 15 - 37 U/L    ALT (SGPT) 117 (H) 16 - 61 U/L   GLUCOSE, POC    Collection Time: 11/22/17  8:44 AM   Result Value Ref Range    Glucose (POC) 182 (H) 70 - 110 mg/dL         Principal Problem:    Hypotension (11/20/2017)    Active Problems:    Malignant neoplasm of colon (HCC)/ metastasis to liver. following Dr. Reyes Levans  (3/28/2017)      Pulmonary embolus (HCC)/ post rt leg DVT/. post IVC filter .  on eliquis (3/28/2017)      Type 2 diabetes mellitus with hyperglycemia, with long-term current use of insulin ([facility] [address] (4/18/2017)      Bleeding esophageal varices ([address] (11/20/2017)      Hyperkalemia (11/20/2017)      Acute renal failure (ARF) ([address] (11/20/2017)      Renal failure (11/20/2017)      GI bleed (11/20/2017)      Metastatic colorectal cancer ([facility] [address] (11/20/2017)      Advanced care planning/counseling discussion (11/21/2017)          Assessment:    Condition: In serious condition. Unchanged.          Patient with metastatic colon ca to liver and lung now in multiorgan system failure  Prognosis is quite grim and family is aware  Nephrology and gastroenterology inputs as outlined  Await family meeting results for determination of limits or goals of care (currently full code)

## 2017-11-22 NOTE — ACP (ADVANCE CARE PLANNING)
Mr Andrey Squires is girlfriend, Cynthia Dukes. AMD signed 11-21-17, witnessed by Palliative NP's Andrew Walsh and Bryce De La Rosa and ICU PA Rajinder Ervin. Karl Taylor made decision about code status for partial code - and was clear with NP Andrew Walsh and Mr Avery's sister Karl Taylor present, no chest compressions, no intubation and no shocks. Only medications.

## 2017-11-22 NOTE — PROGRESS NOTES
and Palliative NP Liam Ames met with Mr Avery's sisters Renée Espino and Bel Blanton who just arrived from [place], in ICU conference room, at sisters' request.    Scot Velásquez offered listening support and compassion as Renée Espino and Bel Blanton shared a little about their brother (called \"Ty\"), describing him as Jarrell Rm, funny, spoiled, our protector; he loves women! \" They also shared a little about their family's recent losses, saying \"it's been a hard year. \" Mr Phoenix Weston grandmother, one of his 3 sons and a nephew have  this year. When asked how they have coped with these losses, sisters stated, \"we're a strong family. \"    Sisters were updated about Mr Avery's medical condition and their questions were answered. Renéeyanelis Espino and Bel Blanton stated that they think Mr Clemente Chavira \"is scared of dying, not of the process of dying, but of not being able to protect us any longer. \" Sisters agreed that Mr Clemente Chavira needs their reassurance at this time and that his girlfriend Renée Espino needs their support in making decisions for him. (Mr Clemente Chavira named his girlfriend Kayli Dailey as his medical power of ; sisters expressed their understanding and agreement with his decision.)     Sisters Renée Espino and Bel Blanton shared that Mr Phoenix Weston two sons are coming from [place] - one arrived with his aunts and one is coming in tonight. They expressed that once everyone is gathered it may be helpful for the ICU team to update the family. (Mr Alireza Flores and ICU Intensivist Dr Alexis De La Rosa were made aware of this.)    Sisters expressed their gratitude for visit and information, support. Pictures of Mr Clemente Chavira were shared. No other issues or concerns were expressed. Mr Susan Grajeda girlfriend/NATHENYANELIS Renée Espino arrived and after talking with Mr Phoenix Weston sisters, Renée Espino decided to make Mr Clemente Chavira a partial code, \"honoring his wishes\" - no chest compressions, no intubation and no shocks, only medications. Chaplains will continue to be available for follow up support as needed/requested.     Beverley Evans Randy Marcelo

## 2017-11-22 NOTE — PROGRESS NOTES
attended the interdisciplinary rounds for Ary Verduzco, who is a 47 y.o.,male. Patients Primary Language is: [place]. According to the patients EMR Scientology Affiliation is: [facility].     The reason the Patient came to the hospital is:   Patient Active Problem List    Diagnosis Date Noted    Advanced care planning/counseling discussion 11/21/2017    Bleeding esophageal varices ([address] 11/20/2017    Hyperkalemia 11/20/2017    Hypotension 11/20/2017    Acute renal failure (ARF) ([address] 11/20/2017    Renal failure 11/20/2017    GI bleed 11/20/2017    Metastatic colorectal cancer ([facility] [address] 11/20/2017    Type 2 diabetes mellitus with hyperglycemia, with long-term current use of insulin ([facility] [address] 04/18/2017    Malignant neoplasm of colon (HCC)/ metastasis to liver. following Dr. Arjun Corey  03/28/2017    Pulmonary embolus (HCC)/ post rt leg DVT/. post IVC filter . on eliquis  03/28/2017      Plan:  Lina Garrido will continue to follow and will provide pastoral care on an as needed/requested basis.  recommends bedside caregivers page  on duty if patient shows signs of acute spiritual or emotional distress.     [address] [facility]  Board Certified [facility]   [phone]

## 2017-11-22 NOTE — PROGRESS NOTES
[facility] Hospitalist Group  Progress Note    Patient: Sophia Ortiz Age: 47 y.o. : 1965 MR#: [id number] SSN: [id number]  Date:     Subjective:     Patient lying in bed in NAD, frail    Assessment/Plan: 1? GI Bleed, ANemia  2- SADA  3- Hypotension, ? shock- likely hypovolemic.  DOUBT Sepsis  4- Metastatic Colon Ca  5- Ascites  6- h/o PE- was on eliquis  7- acute encephalopathy- uremic  8- Severe Protein calorie malnutrition        PLAN    Octreotide PPI drips  Monitor H&H , Transfuse PRN, GI following  Monitor renal function, iv fluids, I/O, nephro on case  pressors as needed  Palliative care following, partial code noted  D/w Girlfriend at bedside, grim prognosis  Judicious Pain control  D/w RN      Case discussed with:  []Patient  []Family  []Nursing  []Case Management  DVT Prophylaxis:  []Lovenox  []Hep SQ  []SCDs  []Coumadin   []On Heparin gtt    Objective:   VS:   Visit Vitals    BP 95/64    Pulse 99    Temp 97.2 °F (36.2 °C)    Resp 14    Ht 5' 7\" (1.702 m)    Wt 70.4 kg (155 lb 3.3 oz)    SpO2 100%    BMI 24.31 kg/m2      Tmax/24hrs: Temp (24hrs), Av.5 °F (36.4 °C), Min:97.2 °F (36.2 °C), Max:97.9 °F (36.6 °C)      Intake/Output Summary (Last 24 hours) at 17 1737  Last data filed at 17 0700   Gross per 24 hour   Intake           2513.4 ml   Output                0 ml   Net           2513.4 ml       General:  Awake, follows some commands  Cardiovascular:  S1S2+, RRR  Pulmonary:  Coarse bs b/l  GI:  Soft, BS+, NT, ND, +ascites  Extremities:  ++ edema  Frail, cachetic      Labs:      Labs reviewed    Signed By: Gatito Helton MD

## 2017-11-22 NOTE — PROGRESS NOTES
Phone: [phone]     Hematology / Oncology Progress Note  [facility]      Patient: Heather Gandara   MRN: [id number]         CSN: [id number]    YOB: 1965      Admit Date: 2017    Assessment:     Principal Problem:    Hypotension (2017)    Active Problems:    Malignant neoplasm of colon (HCC)/ metastasis to liver. following Dr. Jaquan Dalton  (3/28/2017)      Pulmonary embolus (HCC)/ post rt leg DVT/. post IVC filter . on eliquis  (3/28/2017)      Type 2 diabetes mellitus with hyperglycemia, with long-term current use of insulin ([address] (2017)      Bleeding esophageal varices ([address] (2017)      Hyperkalemia (2017)      Acute renal failure (ARF) ([address] (2017)      Renal failure (2017)      GI bleed (2017)      Metastatic colorectal cancer ([address] (2017)      Advanced care planning/counseling discussion (2017)        Plan:     Supportive care  to continue. Remains on octreotide and pressors. Palliative care support appreciated. If continues to improve mentation, pt may be able to complete advance directives himself. hospice evaluation.     Indira Petersen MD  [facility] [phone]      Subjective:     Alert this am,  Oriented to place and person    Objective:     Visit Vitals    /81    Pulse (!) 106    Temp 97.6 °F (36.4 °C)    Resp 17    Ht 5' 7\" (1.702 m)    Wt 69.2 kg (152 lb 9.6 oz)    SpO2 99%    BMI 23.9 kg/m2             Temp (24hrs), Av.7 °F (36.5 °C), Min:97.6 °F (36.4 °C), Max:97.8 °F (36.6 °C)        Intake/Output Summary (Last 24 hours) at 17 0825  Last data filed at 17 1900   Gross per 24 hour   Intake           2219.1 ml   Output               40 ml   Net           2179.1 ml     Review of Systems:   Could not be obtained in detail, leg pain    Physical Exam:  Constitutional: Alert, oriented to place and person  Eyes: PERRLA, icteric, pallor  Ears, nose, mouth, throat, and face: no palpable Lymph nodes, no mucositis, no thrush. Respiratory: martha breath sounds  Cardiovascular: S1S2, port site clear   Gastrointestinal: ascites  Integument: no rash, no petechiae, no ecchymosis. Musculoskeletal:bitemporal wasting, cachexia.   Neurological: alert, aware he is in the hospital, recognizes person, difficulty comprehending questions      Labs:  Recent Results (from the past 24 hour(s))   METABOLIC PANEL, BASIC    Collection Time: 11/21/17 10:17 AM   Result Value Ref Range    Sodium 129 (L) 136 - 145 mmol/L    Potassium 5.6 (H) 3.5 - 5.5 mmol/L    Chloride 99 (L) 100 - 108 mmol/L    CO2 12 (L) 21 - 32 mmol/L    Anion gap 18 3.0 - 18 mmol/L    Glucose 143 (H) 74 - 99 mg/dL     (H) 7.0 - 18 MG/DL    Creatinine 6.76 (H) 0.6 - 1.3 MG/DL    BUN/Creatinine ratio 27 (H) 12 - 20      GFR est AA 11 (L) >60 ml/min/1.73m2    GFR est non-AA 9 (L) >60 ml/min/1.73m2    Calcium 8.1 (L) 8.5 - 10.1 MG/DL   HGB & HCT    Collection Time: 11/21/17 12:00 PM   Result Value Ref Range    HGB 10.5 (L) 13.0 - 16.0 g/dL    HCT 30.4 (L) 36.0 - 48.0 %   GLUCOSE, POC    Collection Time: 11/21/17 12:19 PM   Result Value Ref Range    Glucose (POC) 98 70 - 110 mg/dL   EKG, 12 LEAD, SUBSEQUENT    Collection Time: 11/21/17  1:49 PM   Result Value Ref Range    Ventricular Rate 101 BPM    Atrial Rate 101 BPM    P-R Interval 168 ms    QRS Duration 78 ms    Q-T Interval 352 ms    QTC Calculation (Bezet) 456 ms    Calculated P Axis 47 degrees    Calculated R Axis 32 degrees    Calculated T Axis 51 degrees    Diagnosis       Sinus tachycardia  Low voltage QRS  Nonspecific T wave abnormality  Abnormal ECG  When compared with ECG of 20-NOV-2017 18:47,  Criteria for Septal infarct are no longer present  Confirmed by Lorne Leon MD, Moiz Pacer ([id number]) on 11/21/2017 4:08:17 PM     HGB & HCT    Collection Time: 11/21/17  6:30 PM   Result Value Ref Range    HGB 11.1 (L) 13.0 - 16.0 g/dL    HCT 31.3 (L) 36.0 - 48.0 %   GLUCOSE, POC Collection Time: 11/21/17  6:36 PM   Result Value Ref Range    Glucose (POC) 89 70 - 110 mg/dL   GLUCOSE, POC    Collection Time: 11/21/17 11:07 PM   Result Value Ref Range    Glucose (POC) 104 70 - 770 mg/dL   METABOLIC PANEL, BASIC    Collection Time: 11/22/17  2:13 AM   Result Value Ref Range    Sodium 131 (L) 136 - 145 mmol/L    Potassium 5.3 3.5 - 5.5 mmol/L    Chloride 99 (L) 100 - 108 mmol/L    CO2 13 (L) 21 - 32 mmol/L    Anion gap 19 (H) 3.0 - 18 mmol/L    Glucose 129 (H) 74 - 99 mg/dL     (H) 7.0 - 18 MG/DL    Creatinine 6.55 (H) 0.6 - 1.3 MG/DL    BUN/Creatinine ratio 27 (H) 12 - 20      GFR est AA 11 (L) >60 ml/min/1.73m2    GFR est non-AA 9 (L) >60 ml/min/1.73m2    Calcium 8.0 (L) 8.5 - 10.1 MG/DL   HGB & HCT    Collection Time: 11/22/17  2:13 AM   Result Value Ref Range    HGB 9.8 (L) 13.0 - 16.0 g/dL    HCT 27.4 (L) 36.0 - 48.0 %   HEPATIC FUNCTION PANEL    Collection Time: 11/22/17  2:13 AM   Result Value Ref Range    Protein, total 5.3 (L) 6.4 - 8.2 g/dL    Albumin 1.2 (L) 3.4 - 5.0 g/dL    Globulin 4.1 (H) 2.0 - 4.0 g/dL    A-G Ratio 0.3 (L) 0.8 - 1.7      Bilirubin, total 11.7 (H) 0.2 - 1.0 MG/DL    Bilirubin, direct 10.2 (H) 0.0 - 0.2 MG/DL    Alk.  phosphatase 967 (H) 45 - 117 U/L    AST (SGOT) 264 (H) 15 - 37 U/L    ALT (SGPT) 117 (H) 16 - 61 U/L

## 2017-11-22 NOTE — PROGRESS NOTES
[facility]  Progress Note    Patient: Genny Kidney Age: 47 y.o. : 1965 MR#: [id number] SSN: [id number]  Date:     Subjective:      Patient was seen and evaluated on 17  Patient lying in bed in NAD    Assessment/Plan: 1? GI Bleed, ANemia  2- SADA  3- Hypotension, ? shock- likely hypovolemic.  DOUBT Sepsis  4- Metastatic Colon Ca  5- Ascites  6- h/o PE- was on eliquis  7- acute encephalopathy- uremic        PLAN    PPI, Octreotide  Monitor H&H , Transfuse PRN, GI following  Monitor renal function, iv fluids, I/O, nephro on case  pressors as needed  Palliative care consult      Case discussed with:  []Patient  []Family  []Nursing  []Case Management  DVT Prophylaxis:  []Lovenox  []Hep SQ  []SCDs  []Coumadin   []On Heparin gtt    Objective:   VS:   Visit Vitals    BP 95/64    Pulse 99    Temp 97.3 °F (36.3 °C)    Resp 14    Ht 5' 7\" (1.702 m)    Wt 70.4 kg (155 lb 3.3 oz)    SpO2 100%    BMI 24.31 kg/m2      Tmax/24hrs: Temp (24hrs), Av.6 °F (36.4 °C), Min:97.3 °F (36.3 °C), Max:97.9 °F (36.6 °C)    Intake/Output Summary (Last 24 hours) at 17 0930  Last data filed at 17 1900   Gross per 24 hour   Intake             2006 ml   Output               40 ml   Net             1966 ml       General:  Awake, follows some commands  Cardiovascular:  S1S2+, RRR  Pulmonary:  CTA b/l  GI:  Soft, BS+, NT, ND, +ascites  Extremities:  ++ edema      Labs:      Labs reviewed    Signed By: Laisha Orellana MD

## 2017-11-22 NOTE — PROGRESS NOTES
Formerly named [facility]: [phone]  [facility]: [phone]   [facility]: [phone]    Patient Name: Dwayne Joshua  YOB: 1965    Date of Initial Consult: 11/21/2017  Reason for Consult: care decisions  Requesting Provider: Dr. Starr Jesus   Primary Care Physician: Awais Mendoza MD      SUMMARY:   Dwayne Joshua is a 47y.o. year old with a past history of metastatic colon cancer to the liver, on 4th  line chemotherapy, diabetes, hypertension, who was admitted on 11/20/2017 from home with a diagnosis of rectal bleeding and hematemesis. In the ER he was found to have acute renal failure, hyperkalemia, hypotension. Current medical issues leading to Palliative Medicine involvement include: 47year old male with metastatic colon cancer, now with hepatorenal syndrome, who is found to not be a candidate for dialysis and has a very poor prognosis. Palliative medicine is consulted to discuss goals of care and care decisions. PALLIATIVE DIAGNOSES:   1. Advanced care plan discussions  2. Metastatic colon cancer   3. Acute renal failure   4. GI bleed  5. pain         PLAN:   1. Patient seen at bedside, along with Chaplain Denise. Sisters and girlfriend Coaxis later joined. This morning patient was awake and responding to simple questions. He was able to say that he was in the hospital and denied pain. He was c/o having stools and needing to urinate. I provided the urinal, however he was unable to go. We spoke with his sisters at length, gave an update on his medical condition and prognosis. Both agreed that his girlfriend Coaxis is the best person to make medical decisions on his behalf. They note that she has been there for him through his entire illness. The patient was able to sign an AMD yesterday naming Coaxis. The patient is not , has 2 adult sons, one son passed away earlier this year per his sisters.  His mother is still living and he has 2 sisters and a brother. His family all live in [place] and the patient also lived in [place], moved here 1.5 years ago. 2. Advanced care plan discussions; Yesterday the patient was able to sign an AMD naming his girlfriend Renée Espino as MPOA. His sisters agreed that Renée Espino would make the best decisions on his behalf. We updated the sisters today and after the family spoke, girlfrienmignon Espino approached me and asked to make the patient a partial code, no compressions, no intubation, no shocks, ACLS meds only. She said that she wants to honor his wishes to live longer, but does not want him to suffer. Palliative medicine will continue to participate in ongoing goals of care discussions. At this time full aggressive treatment, partial code-ACLS meds only, no shocks, no compressions, no intubation. 3.  Metastatic colon cancer followed by Oncology, on 4th line chemotherapy. Has steady decline in health. Shared with Renée Espino yesterday and sisters today despite the chemotherapy and aggressive care, his cancer has progressed and unfortunately there are not treatments left. 4. Acute renal failure Nephrology on board, not a candidate for dialysis due to hypotension and metastatic cancer. All discussed with Renée Espino and sisters, who understand. 5. pain  Pt denies pain when asked but per his sisters and nurse has been moaning, especially during turns, changes. RN reports pt had little relief from Percocet and expressed concerns about his ability to swallow pills. Percocet discontinued. Roxicodone concentrate 5-10 mg Q4 hr PRN ordered for pain/SOB. Per pharmacy 20 mg/1 ml concentrate is unavailable, 1 mg/1ml solution substituted. If patient becomes unable to swallow liquids this may need to be changed. If patients family elects comfort measures at any point would consider morphine, either Roxanol or PCA for pain, air hunger. 6. Initial consult note routed to primary continuity provider  7.  Communicated plan of care with: Palliative IDT, girlfriend, oncology, ICU staff        GOALS OF CARE:   Full aggressive care, partial code-no compressions, no shock, no intubation, ACLS drugs only    Advance Care Planning 11/22/2017   Patient's Healthcare Decision Maker is: Named in scanned ACP document   Primary Decision Maker Name Rah Domingo   Primary Decision Maker Phone Number [phone]   Primary Decision Maker Relationship to Patient Life partner   Confirm Advance Directive Yes, on file           HISTORY:     History obtained from: chart, oncology    CHIEF COMPLAINT: GI bleed     HPI/SUBJECTIVE:    The patient is:   [x] Verbal and participatory somewhat limited  [] Non-participatory due to:    Please see summary     Clinical Pain Assessment (nonverbal scale for nonverbal patients): pt denies pain, however nonverbal signs noted by family and nurse, especially with turning     FUNCTIONAL ASSESSMENT:     Palliative Performance Scale (PPS): 30          PSYCHOSOCIAL/SPIRITUAL SCREENING:     Advance Care Planning:  Advance Care Planning 11/22/2017   Patient's Healthcare Decision Maker is: Named in scanned ACP document   Primary Decision Maker Name Rah Domingo   Primary Decision Maker Phone Number [phone]   Primary Decision Maker Relationship to Patient Life partner   Confirm Advance Directive Yes, on file        Any spiritual / Taoism concerns:  [] Yes /  [] No    Caregiver Burnout:  [] Yes /  [x] No /  [] No Caregiver Present      Anticipatory grief assessment:   [] Normal  / [] Maladaptive       ESAS Anxiety: Anxiety: 2    ESAS Depression:          REVIEW OF SYSTEMS:     Positive and pertinent negative findings in ROS are noted above in HPI. The following systems were [x] reviewed / [] unable to be reviewed as noted in HPI  Other findings are noted below. Systems: constitutional, ears/nose/mouth/throat, respiratory, gastrointestinal, genitourinary, musculoskeletal, integumentary, neurologic, psychiatric, endocrine. Positive findings noted below. Modified ESAS Completed by: provider   Fatigue: 6 Drowsiness: 2     Pain: 4   Anxiety: 2 Nausea: 0   Anorexia: 6 Dyspnea: 2           Stool Occurrence(s): 1        PHYSICAL EXAM:     Wt Readings from Last 3 Encounters:   11/22/17 70.4 kg (155 lb 3.3 oz)   09/29/17 71.2 kg (157 lb)   09/08/17 73.8 kg (162 lb 9.6 oz)     Blood pressure 95/64, pulse 99, temperature 97.2 °F (36.2 °C), resp. rate 14, height 5' 7\" (1.702 m), weight 70.4 kg (155 lb 3.3 oz), SpO2 100 %. Pain:  Pain Scale 1: Numeric (0 - 10)  Pain Intensity 1: 0                     Constitutional: thin frail male who is lying in bed, in NAD   CV: BLE edema  Respiratory: breathing not labored  Skin: pale  Neurologic: alerts to his name, oriented x 2   Psychiatric: full affect, no hallucinations  Other:       HISTORY:     Principal Problem:    Hypotension (11/20/2017)    Active Problems:    Malignant neoplasm of colon (HCC)/ metastasis to liver. following Dr. Svetlana Sood  (3/28/2017)      Pulmonary embolus (HCC)/ post rt leg DVT/. post IVC filter . on eliquis  (3/28/2017)      Type 2 diabetes mellitus with hyperglycemia, with long-term current use of insulin ([address] (4/18/2017)      Bleeding esophageal varices ([address] (11/20/2017)      Hyperkalemia (11/20/2017)      Acute renal failure (ARF) ([address] (11/20/2017)      Renal failure (11/20/2017)      GI bleed (11/20/2017)      Metastatic colorectal cancer ([address] (11/20/2017)      Advanced care planning/counseling discussion (11/21/2017)      Past Medical History:   Diagnosis Date    Bleeding     Colon cancer ([address]     Diabetes ([address]     HTN (hypertension)     Liver cancer ([address]       History reviewed. No pertinent surgical history. Family History   Problem Relation Age of Onset    Cancer Father      lung     History reviewed, no pertinent family history.   Social History   Substance Use Topics    Smoking status: Former Smoker     Types: Cigarettes     Quit date: 3/16/2017    Smokeless tobacco: Never Used    Alcohol use No     No Known Allergies   Current Facility-Administered Medications   Medication Dose Route Frequency    pantoprazole (PROTONIX) 40 mg in sodium chloride 0.9 % 10 mL injection  40 mg IntraVENous Q12H    oxyCODONE (ROXICODONE) 5 mg/5 mL oral solution 5-10 mg  5-10 mg Oral Q4H PRN    morphine injection 2 mg  2 mg IntraVENous Q4H PRN    NOREPINephrine (LEVOPHED) 8 mg in 5% dextrose 250mL infusion  2-16 mcg/min IntraVENous TITRATE    sodium bicarbonate (8.4%) 100 mEq in dextrose 5% 1,000 mL infusion   IntraVENous CONTINUOUS    octreotide (SANDOSTATIN) 500 mcg in 0.9% sodium chloride 500 mL infusion  50 mcg/hr IntraVENous CONTINUOUS    0.9% sodium chloride infusion 250 mL  250 mL IntraVENous PRN    0.9% sodium chloride infusion 250 mL  250 mL IntraVENous PRN    dextrose (D50W) injection syrg 12.5-25 g  25-50 mL IntraVENous PRN    insulin lispro (HUMALOG) injection   SubCUTAneous AC&HS    glucose chewable tablet 16 g  4 Tab Oral PRN    glucagon (GLUCAGEN) injection 1 mg  1 mg IntraMUSCular PRN    dextrose (D50W) injection syrg 12.5-25 g  25-50 mL IntraVENous PRN    sodium chloride (NS) flush 5-10 mL  5-10 mL IntraVENous Q8H    sodium chloride (NS) flush 5-10 mL  5-10 mL IntraVENous PRN    naloxone (NARCAN) injection 0.4 mg  0.4 mg IntraVENous PRN    diphenhydrAMINE (BENADRYL) injection 12.5 mg  12.5 mg IntraVENous Q4H PRN    ondansetron (ZOFRAN) injection 4 mg  4 mg IntraVENous Q4H PRN    docusate sodium (COLACE) capsule 100 mg  100 mg Oral BID    sodium chloride (NS) flush 5-10 mL  5-10 mL IntraVENous Q8H    sodium chloride (NS) flush 5-10 mL  5-10 mL IntraVENous PRN        LAB AND IMAGING FINDINGS:     Lab Results   Component Value Date/Time    WBC 14.1 11/21/2017 03:38 AM    HGB 9.9 11/22/2017 11:10 AM    PLATELET 892 75/15/7884 03:38 AM     Lab Results   Component Value Date/Time    Sodium 131 11/22/2017 02:13 AM    Potassium 5.3 11/22/2017 02:13 AM Chloride 99 11/22/2017 02:13 AM    CO2 13 11/22/2017 02:13 AM     11/22/2017 02:13 AM    Creatinine 6.55 11/22/2017 02:13 AM    Calcium 8.0 11/22/2017 02:13 AM    Magnesium 3.5 11/20/2017 07:00 PM      Lab Results   Component Value Date/Time    AST (SGOT) 264 11/22/2017 02:13 AM    Alk. phosphatase 967 11/22/2017 02:13 AM    Protein, total 5.3 11/22/2017 02:13 AM    Albumin 1.2 11/22/2017 02:13 AM    Globulin 4.1 11/22/2017 02:13 AM     Lab Results   Component Value Date/Time    INR 1.5 11/21/2017 03:38 AM    Prothrombin time 17.1 11/21/2017 03:38 AM    aPTT 49.4 11/20/2017 05:43 PM      No results found for: IRON, FE, TIBC, IBCT, PSAT, FERR   No results found for: PH, PCO2, PO2  No components found for: [place]   Lab Results   Component Value Date/Time    CK 71 07/19/2017 12:25 AM    CK - MB <1.0 07/19/2017 12:25 AM              Total time: 65 minutes   Counseling / coordination time, spent as noted above: 60 minutes   > 50% counseling / coordination?: yes     Prolonged service was provided for  [x]30 min   []75 min in face to face time in the presence of the patient, spent as noted above. Time Start: 5963  Time End:    1500  Note: this can only be billed with  (initial) or 21 [phone] (follow up). If multiple start / stop times, list each separately.

## 2017-11-23 NOTE — ROUTINE PROCESS
Blood drawn from mediport and with moderate thick  Yellowish sputum from mouth and nares. GASTON PATENT AND with good urine output. NO VENT SETTIG CHANGES NOTED.

## 2017-11-23 NOTE — ROUTINE PROCESS
Turned and repositioned for comfort. Suctioned mouth with yankauer and with small whitish secretions. Lungs sound coarse upon auscultation.

## 2017-11-23 NOTE — ROUTINE PROCESS
Bedside, Verbal and Written shift change report given to kayla (oncoming nurse) by Ana Hyatt rn (offgoing nurse). Report included the following information SBAR, Kardex, ED Summary, OR Summary, Procedure Summary, Intake/Output, MAR, Accordion, Recent Results, Med Rec Status and Alarm Parameters .

## 2017-11-23 NOTE — PROGRESS NOTES
RENAL DAILY PROGRESS NOTE    Subjective:   Admitted for GIB seen for SADA    Complaint: awake but does not answer any questions    Current Facility-Administered Medications   Medication Dose Route Frequency    insulin lispro (HUMALOG) injection   SubCUTAneous Q6H    lactobacillus sp. 50 billion cpu (BIO-K PLUS) capsule 1 Cap  1 Cap Oral DAILY    ferrous sulfate tablet 325 mg  325 mg Oral ACB    HYDROcodone-acetaminophen (NORCO) 5-325 mg per tablet 1 Tab  1 Tab Oral Q6H PRN    zolpidem (AMBIEN) tablet 5 mg  5 mg Oral QHS PRN    albuterol (PROVENTIL VENTOLIN) nebulizer solution 2.5 mg  2.5 mg Nebulization TID RT    therapeutic multivitamin (THERAGRAN) tablet 1 Tab  1 Tab Oral DAILY    pantoprazole (PROTONIX) 40 mg in sodium chloride 0.9 % 10 mL injection  40 mg IntraVENous Q12H    oxyCODONE (ROXICODONE) 5 mg/5 mL oral solution 5-10 mg  5-10 mg Oral Q4H PRN    morphine injection 2 mg  2 mg IntraVENous Q4H PRN    NOREPINephrine (LEVOPHED) 8 mg in 5% dextrose 250mL infusion  2-16 mcg/min IntraVENous TITRATE    sodium bicarbonate (8.4%) 100 mEq in dextrose 5% 1,000 mL infusion   IntraVENous CONTINUOUS    octreotide (SANDOSTATIN) 500 mcg in 0.9% sodium chloride 500 mL infusion  50 mcg/hr IntraVENous CONTINUOUS    0.9% sodium chloride infusion 250 mL  250 mL IntraVENous PRN    0.9% sodium chloride infusion 250 mL  250 mL IntraVENous PRN    dextrose (D50W) injection syrg 12.5-25 g  25-50 mL IntraVENous PRN    glucose chewable tablet 16 g  4 Tab Oral PRN    glucagon (GLUCAGEN) injection 1 mg  1 mg IntraMUSCular PRN    dextrose (D50W) injection syrg 12.5-25 g  25-50 mL IntraVENous PRN    naloxone (NARCAN) injection 0.4 mg  0.4 mg IntraVENous PRN    diphenhydrAMINE (BENADRYL) injection 12.5 mg  12.5 mg IntraVENous Q4H PRN    ondansetron (ZOFRAN) injection 4 mg  4 mg IntraVENous Q4H PRN    docusate sodium (COLACE) capsule 100 mg  100 mg Oral BID    sodium chloride (NS) flush 5-10 mL  5-10 mL IntraVENous Q8H    sodium chloride (NS) flush 5-10 mL  5-10 mL IntraVENous PRN           Objective:   Patient Vitals for the past 24 hrs:   Temp Pulse Resp BP SpO2   11/23/17 0900 - (!) 105 14 94/65 99 %   11/23/17 0850 97.2 °F (36.2 °C) - - - -   11/23/17 0830 - (!) 104 15 97/76 100 %   11/23/17 0800 97.2 °F (36.2 °C) (!) 105 12 100/76 98 %   11/23/17 0730 - 98 9 90/68 99 %   11/23/17 0700 - (!) 104 13 (!) 84/68 99 %   11/23/17 0630 - 96 9 (!) 80/52 99 %   11/23/17 0600 - (!) 103 12 113/79 99 %   11/23/17 0530 - 97 9 (!) 84/62 99 %   11/23/17 0500 - 97 9 (!) 85/57 99 %   11/23/17 0430 - 96 10 (!) 88/63 99 %   11/23/17 0419 97.8 °F (36.6 °C) - - - -   11/23/17 0400 - (!) 101 9 97/63 100 %   11/23/17 0330 - (!) 105 13 93/64 98 %   11/23/17 0300 - (!) 101 12 (!) 82/55 99 %   11/23/17 0230 - 99 9 (!) 88/58 99 %   11/23/17 0200 - 98 9 (!) 80/56 99 %   11/23/17 0130 - 97 9 (!) 82/53 99 %   11/23/17 0100 - (!) 106 12 91/64 98 %   11/23/17 0030 - 100 10 (!) 82/62 98 %   11/23/17 0000 97.9 °F (36.6 °C) 98 9 (!) 84/63 98 %   11/22/17 2330 - (!) 101 10 (!) 88/60 98 %   11/22/17 2300 - (!) 104 12 91/56 98 %   11/22/17 2230 - 98 9 (!) 77/49 98 %   11/22/17 2200 - (!) 103 11 98/74 98 %   11/22/17 2130 - (!) 101 9 (!) 87/58 98 %   11/22/17 2100 - 98 10 91/63 98 %   11/22/17 2030 - 96 9 (!) 82/61 98 %   11/22/17 2000 97.8 °F (36.6 °C) (!) 107 12 103/72 98 %   11/22/17 1933 97.9 °F (36.6 °C) - - - -   11/22/17 1930 - 96 9 90/62 99 %   11/22/17 1800 - (!) 104 11 98/58 98 %   11/22/17 1700 - (!) 102 11 100/75 98 %   11/22/17 1600 97.2 °F (36.2 °C) 97 10 106/78 98 %   11/22/17 1553 97.2 °F (36.2 °C) - - - -   11/22/17 1500 - 97 10 91/69 98 %   11/22/17 1400 - 95 9 93/80 99 %   11/22/17 1300 - 100 11 (!) 87/69 99 %   11/22/17 1214 97.4 °F (36.3 °C) - - - -   11/22/17 1200 97.4 °F (36.3 °C) (!) 105 18 103/68 100 %   11/22/17 1100 - (!) 102 16 103/63 100 %        Weight change: 0.543 kg (1 lb 3.1 oz)     11/21 1901 - 11/23 0700  In: 3998.4 [I.V.:3998.4]  Out: -     Intake/Output Summary (Last 24 hours) at 11/23/17 1044  Last data filed at 11/22/17 2000   Gross per 24 hour   Intake           1861.2 ml   Output                0 ml   Net           1861.2 ml     Physical Exam: awake, not in any acute resp distress    HEENT: non icteric  Neck: no jvd  Cardiovascular: regular,no rub  C/L: clear ant/lat  Abdomen: + bs  Ext: no edema    Data Review:     LABS:   Hematology: Recent Labs      11/23/17   0330  11/22/17   1110  11/22/17   0213  11/21/17   1830  11/21/17   1200  11/21/17   0338  11/21/17   0053  11/20/17   1743   WBC  19.4*   --    --    --    --   14.1*  14.8*  13.7*   HGB  9.8*  9.9*  9.8*  11.1*  10.5*  10.9*  11.1*  8.5*   HCT  28.5*  28.4*  27.4*  31.3*  30.4*  30.1*  31.4*  24.5*   Pl 109K  Chemistry: Recent Labs      11/23/17   0330  11/22/17   0213  11/21/17   1017  11/21/17   0338  11/20/17   1900   BUN  178*  175*  182*  189*  >150*   CREA  7.01*  6.55*  6.76*  6.69*  7.39*   CA  8.7  8.0*  8.1*  8.2*  8.7   ALB   --   1.2*   --   1.2*  1.4*   K  5.6*  5.3  5.6*  6.1*  6.8*   NA  127*  131*  129*  129*  124*   CL  95*  99*  99*  99*  93*   CO2  11*  13*  12*  12*  10*   GLU  153*  129*  143*  128*  129*       IMPRESSION AND PLAN:   SADA 2 to GIB/ hypotension liver failure. Pt has terminal colon CA with liver mets and is not a candidate for dialysis. Cont with supportive care. Slight inc in K noted. Cont with Bicarb drip and IV insulin D50W/Calcium.  Agree with palliative/hospice care         Shaye Joseph MD  11/23/2017

## 2017-11-23 NOTE — ROUTINE PROCESS
Assumed care. Appears to be resting QUIETLY and no sign of resp distress noted. Girlfriend at bedside and update given. HOB UP TO 30 DEGREES AND REPOSITIONED  FO COMFORT.

## 2017-11-23 NOTE — PROGRESS NOTES
Subjective:  Symptoms:  Stable. Diet:  NPO. Activity level: Impaired due to weakness. Pain:  He reports no pain. Patient awake but not able to converse fully; indicated that he did not have pain but unclear when asked about short of breath  Family meeting with palliative care as outlined in notes from yesterday - change in code status to chemical code only with no escalation of care  No events overnight    Temp:  [95 °F (35 °C)-97.9 °F (36.6 °C)]   Pulse (Heart Rate):  []   BP: ()/()   Resp Rate:  [8-21]   O2 Sat (%):  [97 %-100 %]   Weight:  [69.2 kg (152 lb 9.6 oz)-70.9 kg (156 lb 6.4 oz)]      11/21 1901 - 11/23 0700  In: 3998.4 [I.V.:3998.4]  Out: -       Objective:  General Appearance:  Comfortable and in no acute distress. Vital signs: (most recent): Blood pressure 101/73, pulse (!) 109, temperature 97 °F (36.1 °C), resp. rate 15, height 5' 7\" (1.702 m), weight 70.9 kg (156 lb 6.4 oz), SpO2 100 %. Output: Minimal urine output. Lungs:  Normal respiratory rate and normal effort. There are decreased breath sounds. Heart: Normal rate. (Distant HS)  Abdomen: Abdomen is distended.       Recent Results (from the past 24 hour(s))   GLUCOSE, POC    Collection Time: 11/22/17  5:32 PM   Result Value Ref Range    Glucose (POC) 136 (H) 70 - 110 mg/dL   GLUCOSE, POC    Collection Time: 11/22/17 11:42 PM   Result Value Ref Range    Glucose (POC) 142 (H) 70 - 110 mg/dL   CBC WITH AUTOMATED DIFF    Collection Time: 11/23/17  3:30 AM   Result Value Ref Range    WBC 19.4 (H) 4.6 - 13.2 K/uL    RBC 3.15 (L) 4.70 - 5.50 M/uL    HGB 9.8 (L) 13.0 - 16.0 g/dL    HCT 28.5 (L) 36.0 - 48.0 %    MCV 90.5 74.0 - 97.0 FL    MCH 31.1 24.0 - 34.0 PG    MCHC 34.4 31.0 - 37.0 g/dL    RDW 20.0 (H) 11.6 - 14.5 %    PLATELET 037 (L) 334 - 420 K/uL    MPV 11.1 9.2 - 11.8 FL    NEUTROPHILS 88 (H) 42 - 75 %    BAND NEUTROPHILS 2 0 - 5 %    LYMPHOCYTES 5 (L) 20 - 51 %    MONOCYTES 5 2 - 9 %    EOSINOPHILS 0 0 - 5 %    BASOPHILS 0 0 - 3 %    NRBC 1.0 (H) 0  WBC    ABS. NEUTROPHILS 17.4 (H) 1.8 - 8.0 K/UL    ABS. LYMPHOCYTES 1.0 0.8 - 3.5 K/UL    ABS. MONOCYTES 1.0 0 - 1.0 K/UL    ABS. EOSINOPHILS 0.0 0.0 - 0.4 K/UL    ABS. BASOPHILS 0.0 0.0 - 0.06 K/UL    DF MANUAL      PLATELET COMMENTS ADEQUATE PLATELETS      RBC COMMENTS ANISOCYTOSIS  2+        RBC COMMENTS OVALOCYTES  1+        RBC COMMENTS POLYCHROMASIA  1+        RBC COMMENTS TARGET CELLS  1+       METABOLIC PANEL, BASIC    Collection Time: 11/23/17  3:30 AM   Result Value Ref Range    Sodium 127 (L) 136 - 145 mmol/L    Potassium 5.6 (H) 3.5 - 5.5 mmol/L    Chloride 95 (L) 100 - 108 mmol/L    CO2 11 (L) 21 - 32 mmol/L    Anion gap 21 (H) 3.0 - 18 mmol/L    Glucose 153 (H) 74 - 99 mg/dL     (H) 7.0 - 18 MG/DL    Creatinine 7.01 (H) 0.6 - 1.3 MG/DL    BUN/Creatinine ratio 25 (H) 12 - 20      GFR est AA 10 (L) >60 ml/min/1.73m2    GFR est non-AA 8 (L) >60 ml/min/1.73m2    Calcium 8.7 8.5 - 10.1 MG/DL   GLUCOSE, POC    Collection Time: 11/23/17  5:23 AM   Result Value Ref Range    Glucose (POC) 170 (H) 70 - 110 mg/dL   GLUCOSE, POC    Collection Time: 11/23/17  5:46 AM   Result Value Ref Range    Glucose (POC) 162 (H) 70 - 110 mg/dL   GLUCOSE, POC    Collection Time: 11/23/17 11:01 AM   Result Value Ref Range    Glucose (POC) 187 (H) 70 - 110 mg/dL   HGB & HCT    Collection Time: 11/23/17 11:35 AM   Result Value Ref Range    HGB 9.2 (L) 13.0 - 16.0 g/dL    HCT 26.7 (L) 36.0 - 48.0 %         Principal Problem:    Hypotension (11/20/2017)    Active Problems:    Malignant neoplasm of colon (HCC)/ metastasis to liver. following Dr. Mia Cifuentes  (3/28/2017)      Pulmonary embolus (HCC)/ post rt leg DVT/. post IVC filter .  on eliquis  (3/28/2017)      Type 2 diabetes mellitus with hyperglycemia, with long-term current use of insulin ([facility] (4/18/2017)      Bleeding esophageal varices ([address] (11/20/2017)      Hyperkalemia (11/20/2017)      Acute renal failure (ARF) ([facility] (11/20/2017)      Renal failure (11/20/2017)      GI bleed (11/20/2017)      Metastatic colorectal cancer ([facility] (11/20/2017)      Advanced care planning/counseling discussion (11/21/2017)          Assessment:    Condition: In serious condition. Worsening.        Patient doing poorly as expected but appears comfortable  -continue current therapies as outlined  -update family as warranted and await final decisions about possible comfort measures only

## 2017-11-23 NOTE — PROGRESS NOTES
Attempted dysphagia evaluation. Per RN: h/o as pt is difficult to arouse at this time and inappropriate for PO. Will complete as medically appropriate.      Thank you for this referral.    Juan Carlos Ragland M.S. CCC-SLP/L  Speech-Language Pathologist

## 2017-11-23 NOTE — FAMILY MEETING
[facility] UPDATE:    1900: RN states that while trying to administer pt liquid pain medicine, pt began to cough and did not tolerate PO and may have aspirated. Pt's HR remains slightly tachycardiac, Levophed is now maxed out at 16mcg/min; per RN, pt appears in pain. Met with Jamarcus Ram, pt's girlfriend and POA, at bedside to discuss current condition, prognosis, treatment plans and goals of care. Patient able/not able to participate due to mentation/critical condition. Participants: Provider - Saqib Hernandez, ISRRAEL, PA-C                        Family members: N/A; Pt's Girlfriend - Yariel Azael                        RN: Eula Leos                        Other disciplines: N/A  Discussion encompassing acute change in condition (possible aspiration, unable to toelrate PO, decreased RR) and need for intervention   Therapeutic options, response discussed. Answered all questions and concerns to the best of my ability. Discussed code status, comfort measure options. Following Recommendations:  Pt Elizabeth Party, was updated on the most recent turn of events as outlined above. We discussed pt's current BP, artificially elevated with current vasopressor, which was maxed out, falling respiratory rate, and questionable pain status. Jamarcus Ram had previously stated that she wanted to be able to prolong the pt's life, per his wish, while also not causing him any harm and not wanting him to suffer. The idea of comfort care measures was addressed, as at this time, our ability to treat the pt's pain is limited by his vital signs and deteriorating respiratory rate. Jamarcus Ram again stated that she did not want him to suffer or be in pain but wanted the pt's family here before any other decision regarding full comfort care measures were made. It was confirmed at this time, NO ESCALATION OF CARE. Pt will remain on Levophed, however we will not add any additional vasopressors.  Jamarcus Ram was clear that if pt's BP continued to fall, then that is his time and she wants him to go peacefully. Pain medicine will have to be used with extreme judicious use with regard to respiratory status. Miki Drafts stated that she will call family and they will be coming back tonight, wherein, further decision regarding escalation of care vs. Full Comfort Care measures will hopefully be made. RN updated on care plans. Orders placed. Total Time: 45 mins  Time spent in counseling / coordination:  >50% of time in counseling / coordination?  Yes    Ruby Coombs PA-C  7:46 PM  11/22/17

## 2017-11-24 NOTE — ROUTINE PROCESS
Bedside and Verbal shift change report given to Jaswant Michael RN (oncoming nurse) by Tiffanie Gates RN (offgoing nurse). Report included the following information SBAR, Kardex, Intake/Output, MAR, Recent Results and Cardiac Rhythm is NSR on telemetry.

## 2017-11-24 NOTE — PROGRESS NOTES
[facility] Hospitalist Group  Progress Note    Patient: Genny Kidney Age: 47 y.o. : 1965 MR#: [id number] SSN: [id number]  Date:     Subjective:     Patient lying in bed in NAD. RN at bedside. 2 sisters at bedside    Assessment/Plan: 1? GI Bleed, ANemia  2- SADA  3- Hypotension, ? shock- likely hypovolemic.  DOUBT Sepsis  4- Metastatic Colon Ca  5- Ascites  6- h/o PE- was on eliquis  7- acute encephalopathy- uremic  8- Severe Protein calorie malnutrition        PLAN    Octreotide, PPI  Monitor   iv fluids, I/O, nephro on case  On pressors  Palliative care following, partial code noted  D/w Family at bedside, d/w RN  Judicious Pain control      Case discussed with:  []Patient  []Family  []Nursing  []Case Management  DVT Prophylaxis:  []Lovenox  []Hep SQ  []SCDs  []Coumadin   []On Heparin gtt    Objective:   VS:   Visit Vitals    BP 91/70    Pulse 92    Temp 97.7 °F (36.5 °C)    Resp 8    Ht 5' 7\" (1.702 m)    Wt 70.9 kg (156 lb 6.4 oz)    SpO2 100%    BMI 24.5 kg/m2      Tmax/24hrs: Temp (24hrs), Av.8 °F (36.6 °C), Min:97.6 °F (36.4 °C), Max:97.9 °F (36.6 °C)      Intake/Output Summary (Last 24 hours) at 17 1235  Last data filed at 17 0600   Gross per 24 hour   Intake          2992.16 ml   Output                0 ml   Net          2992.16 ml       General:  Opens eyes, does not follow commands  Cardiovascular:  S1S2+, RRR  Pulmonary:  Coarse bs b/l  GI:  Soft, BS+, NT, ND, + ascites  Extremities:  ++edema          Labs:      Recent Results (from the past 12 hour(s))   GLUCOSE, POC    Collection Time: 17  6:57 AM   Result Value Ref Range    Glucose (POC) 161 (H) 70 - 110 mg/dL   HGB & HCT    Collection Time: 17  2:45 PM   Result Value Ref Range    HGB 9.4 (L) 13.0 - 16.0 g/dL    HCT 26.8 (L) 36.0 - 48.0 %   GLUCOSE, POC    Collection Time: 17  3:25 PM   Result Value Ref Range    Glucose (POC) 175 (H) 70 - 110 mg/dL         Signed By: Suyapa Duarte Salina Yeung MD

## 2017-11-24 NOTE — PROGRESS NOTES
NUTRITION    Nursing Referral: [facility]     RECOMMENDATIONS / PLAN:     - Discontinue supplements 2/2 pt NPO.  - Provide nutrition interventions consistent with plan of care and SLP recommendations. - Continue RD inpatient monitoring and evaluation. NUTRITION INTERVENTIONS & DIAGNOSIS:     [x] Meals/Snacks: general/healthful diet. [x] Medical food supplementation: Magic Cup, TID (discontinue)  [x] Coordination of care: interdisciplinary rounds    Nutrition Diagnosis: Unintentional weight loss related to inadequate energy intake as evidenced by 15 lb, 9% weight loss in the past 1-2 weeks. Patient meets criteria for Severe Protein Calorie Malnutrition as evidenced by:   ASPEN Malnutrition Criteria  Acute Illness, Chronic Illness, or Social/Enviornmental: Acute illness  Energy Intake: Less than/equal to 50% est energy req for greater than/equal to 5 days  Weight Loss: Greater than 5% x 1 mo  Body Fat: Moderate (orbital region)  Muscle Mass: Moderate (temple region, clavicle region)  ASPEN Malnutrition Score - Acute Illness: 24  Acute Illness - Malnutrition Diagnosis: Severe malnutrition. ASSESSMENT:     11/24: Pt NPO, difficult to arouse, weak. No escalation of care per family meeting with palliative. Pt with ascites, liver enlargement, distended abdomen. 11/22: Pt very lethargic during time of visit. Diet started. Poor appetite. Discussed starting supplement with RN and pt's family, both agreeable. Hyponatremia noted, MD to hold off on intervention for now. 11/21: Pt reports poor meal intake for the past several weeks and significant weight loss. Asking for food. Nutrition focused physical exam completed. Worsening renal function. NPO with GI bleed.       Average po intake adequate to meet patients estimated nutritional needs:   [] Yes     [x] No   [] Unable to determine at this time    Diet: DIET NUTRITIONAL SUPPLEMENTS Breakfast, Lunch, Dinner, All Meals; Patience CUPS ARTEMIO  DIET NPO      Food Allergies: NKFA  Current Appetite:   [] Good     [] Fair     [] Poor     [x] Other: NPO  Appetite/meal intake prior to admission:   [] Good     [] Fair     [x] Poor x several weeks    [] Other:  Feeding Limitations:  [] Swallowing difficulty    [] Chewing difficulty    [x] Other: NPO  Current Meal Intake: No data found. BM: 11/22, ascites   Skin Integrity: Blisters to right wrist and forearm  Edema: 2+ UEs and LEs  Pertinent Medications: Reviewed: levophed, colace, SSI, zofran, BIO-K PLUS, theragran    Recent Labs      11/23/17   0330  11/22/17   0213   NA  127*  131*   K  5.6*  5.3   CL  95*  99*   CO2  11*  13*   GLU  153*  129*   BUN  178*  175*   CREA  7.01*  6.55*   CA  8.7  8.0*   ALB   --   1.2*   SGOT   --   264*   ALT   --   117*       Intake/Output Summary (Last 24 hours) at 11/24/17 1301  Last data filed at 11/24/17 0600   Gross per 24 hour   Intake          2992.16 ml   Output                0 ml   Net          2992.16 ml       Anthropometrics:  Ht Readings from Last 1 Encounters:   11/23/17 5' 7\" (1.702 m)     Last 3 Recorded Weights in this Encounter    11/21/17 0500 11/22/17 0400 11/23/17 0400   Weight: 69.2 kg (152 lb 9.6 oz) 70.4 kg (155 lb 3.3 oz) 70.9 kg (156 lb 6.4 oz)     Body mass index is 24.5 kg/(m^2).           Weight History: patient reports 15 lb, 9% weight loss in the past 1-2 weeks     Weight Metrics 11/23/2017 9/29/2017 9/8/2017 8/10/2017 7/18/2017 4/21/2017 3/28/2017   Weight 156 lb 6.4 oz 157 lb 162 lb 9.6 oz 161 lb 4 oz 150 lb 151 lb 158 lb 12.8 oz   BMI 24.5 kg/m2 24.59 kg/m2 25.47 kg/m2 25.26 kg/m2 23.49 kg/m2 24.01 kg/m2 25.25 kg/m2        Admitting Diagnosis: Acute renal failure (ARF) (HCC)  Bleeding esophageal varices (HCC)  Hyperkalemia  Hypotension  GI bleed  Hypotension  Renal failure  Hyperkalemia  Metastatic colorectal cancer ([facility] [address]  Pertinent PMHx: colon cancer, DM, HTN, liver cancer     Education Needs:        [x] None identified  [] Identified - Not appropriate at this time  [] Identified and addressed - refer to education log  Learning Limitations:   [x] None identified  [] Identified    Cultural, Sabianist & ethnic food preferences:  [x] None identified    [] Identified and addressed     ESTIMATED NUTRITION NEEDS:     Calories: 8863-0606 kcal (HBEx1.3-1.5) based on  [x] Actual BW 70 kg     [] IBW   Protein:  gm (1-1.5 gm/kg) based on  [x] Actual BW      [] IBW   Fluid: 1 mL/kcal     MONITORING & EVALUATION:     Nutrition Goal(s):   1. Po intake of meals will meet >75% of patient estimated nutritional needs within the next 7 days.   Outcome:  [] Met/Ongoing    [x]  Not Met    [] New/Initial Goal     Monitoring:   [x] Diet tolerance   [x] Meal intake   [] Supplement intake   [x] GI symptoms/ability to tolerate po diet   [] Respiratory status   [x] Plan of care      Previous Recommendations (for follow-up assessments only):     []   Implemented       []   Not Implemented (RD to address)      [x] No Longer Appropriate     [] No Recommendation Made     Discharge Planning: pending ability to tolerate po diet and plan of care  [x] Participated in care planning, discharge planning, & interdisciplinary rounds as appropriate      Meek Gilbert RD   Pager: [phone]

## 2017-11-24 NOTE — PROGRESS NOTES
Per Dr. Bustamante Heading: okay to DC orders secondary to general med decline. Please re-consult if needed.      Thank you for this referral.    Reuben Verma M.S. CCC-SLP/L  Speech-Language Pathologist

## 2017-11-24 NOTE — DIABETES MGMT
Glycemic Control: Pt discussed in interdisciplinary rounds. Blood glucose ranging from 132 to 187 mg/dL. Pt received 4 units of correctional Lispro insulin coverage yesterday. Pt is currently NPO. Continue inpatient monitoring and intervention.      Saqib Stokes RD, CDE

## 2017-11-25 NOTE — PROGRESS NOTES
DEATH PRONOUNCEMENT NOTE  [facility] Service    Patient: Gillian Vicente MRN: [id number]   SSN: [id number]  YOB: 1965   Age: 47 y.o. Sex: male    Admission Date: 11/20/2017  4:44 PM Time of Death: 4:07PM        Comments:   Called to patient's bedside for apnea and pulselessness. Patient lying in bed, unresponsive to voice or painful stimuli. No spontaneous respirations. No palpable carotid pulse bilaterally. No heart sounds or breath sounds. Pupils fixed and dilated bilaterally. Corneal reflexes absent bilaterally. Examined with nurse Luke Adams,  at bedside  Family present at bedside during exam, questions answered. Attending physician, Dr. Sanna Park, to be notified by nursing.      The [facility] House Officer can be reached at pager  #[phone]    Anuradha,  Hesham Reed MD    November 25, 2017    5:29 PM

## 2017-11-25 NOTE — PROGRESS NOTES
conducted a Follow up consultation and Spiritual Assessment for Dwayne Joshua, who is a 47 y.o.,male. The  provided the following Interventions:  Continued the relationship of care and support. Listened empathically to family. Offered prayer and assurance of continued prayer on patients behalf. Chart reviewed. The following outcomes were achieved:  Patient's family expressed gratitude for pastoral care visit. Assessment:  There are no further spiritual or Sabianism issues which require Spiritual Care Services interventions at this time. Plan:  Chaplains will continue to follow and will provide pastoral care on an as needed/requested basis.  recommends bedside caregivers page  on duty if patient shows signs of acute spiritual or emotional distress. Bereavement Note:     responded to the death of Dwayne Joshua, who is a 47 y.o., male, offering Spiritual Care to patient and family, see flow sheets for interventions. Date of Death: 17    Extended Emergency Contact Information  Primary Emergency Contact: Esther Crawford  Address: [address] [place] [address], [address] [place] [address] Phone: [phone]  Mobile Phone: [phone]  Relation: Life Partner                 YES      NO  UNKNOWN  Life Net   []        []    [x]   Eye Bank   [] [] [x]   Medical Examiner  []        []  [x]   Going to [employer]  [x]        [] []      Autopsy   []        []         [x]   Sympathy Card  [x]        []  Bereavement Materials  [x]        []           Business Card Provided  [x]        []              Home: unknown    Chaplains will continue to follow family and will provide spiritual care as needed.      [address], [address] [facility]

## 2017-11-25 NOTE — PROGRESS NOTES
Subjective:  Symptoms:  Worsening. Diet:  NPO. Patient unresponsive with coarse respiratory rhonchi audible on entering room  BP slowly decreasing  No cognitive interaction or spontaneous movements  No new events overnight    Temp:  [97 °F (36.1 °C)-99.3 °F (37.4 °C)]   Pulse (Heart Rate):  []   BP: ()/(44-97)   Resp Rate:  [7-19]   O2 Sat (%):  [96 %-100 %]   Weight:  [70.9 kg (156 lb 6.4 oz)]      11/23 1901 - 11/25 0700  In: 4450 [I.V.:4450]  Out: 50 [Urine:50]      Objective:  General Appearance:  Ill-appearing, in no acute distress and comfortable. Vital signs: (most recent): Blood pressure (!) 68/45, pulse 97, temperature 99.3 °F (37.4 °C), resp. rate 16, height 5' 7\" (1.702 m), weight 70.9 kg (156 lb 6.4 oz), SpO2 97 %. Output: No urine output and minimal stool output. Lungs:  Bradypnea and normal effort. There are rhonchi and decreased breath sounds. Heart: Normal rate. Regular rhythm. Abdomen: Abdomen is rigid and distended. Absent bowel sounds. No results found for this or any previous visit (from the past 12 hour(s)). Principal Problem:    Hypotension (11/20/2017)    Active Problems:    Malignant neoplasm of colon (HCC)/ metastasis to liver. following Dr. Maegan Martinez  (3/28/2017)      Pulmonary embolus (HCC)/ post rt leg DVT/. post IVC filter . on eliquis  (3/28/2017)      Type 2 diabetes mellitus with hyperglycemia, with long-term current use of insulin ([address] (4/18/2017)      Bleeding esophageal varices ([address] (11/20/2017)      Hyperkalemia (11/20/2017)      Acute renal failure (ARF) ([address] (11/20/2017)      Renal failure (11/20/2017)      GI bleed (11/20/2017)      Metastatic colorectal cancer ([address] (11/20/2017)      Advanced care planning/counseling discussion (11/21/2017)          Assessment:    Condition: In serious condition. Worsening. Patient with metastatic colon Ca with associated hepatic failure and renal failure.   He has multiorgan system failure and decisions by family is no escalation of care.   He is essentially comfort measures at this point except family no erady to make full commitment to stop current meds infusing  -continue current therapy  -patient likely to  in next 24-48hrs

## 2017-11-25 NOTE — PROGRESS NOTES
Nasotracheal suctioning performed large green very very thick secretions, same when I suctioned oral tracheal.  Patient still had coarse breath sounds. Coarse upper airway sounds, can hear upon entering the room.

## 2017-11-26 NOTE — ROUTINE PROCESS
Assumed care of patient. Assessment completed. Family in room and very concerned about the excess secretion the patient had. Attempted to suction with oral suction, patient bit down. 1140 Benadryl given to help with secretions. 1246: Morphine given to decrease pain with NT suctioning. RT Digna Peters in room and performed NT and Oral suctioning on patient. Thick blood tinged mucous noted. Patient manoj fair. 1520: Son yelled for help. Patients head was moving rhythmically to the left, eyes deviated to the left and staring. Small seizure like activity noted. Approximately 60 seconds. 1530: Second seizure occurred last between 45-60seconds. 32 61 16: Sister and patients two sons at bedside. Sister informed that the resp were low and heart rate was trending down. SHe stated she understood. She held patient hand and told him it was ok to go, that the boys were fine and she would look after them. 1538: Heart rate <10, Epinephrine given. Heart rate increased to 41.  1545:Resp rate 2, HR 20  1550: Asystole noted. 1555: Khoa dillard, House Doctor paged,   [phone]: MD in room  [phone]: Official time of death. 1630: Family in room and fiance on the way to hospital.  5256: Family left room, Body prepared for [facility]. 1800: Transferred to [facility]. Girlfriend called to inform her that patient's hat and blanket had been left behind. She stated she would come by tomorrow to pick it up.

## 2021-03-20 NOTE — TELEPHONE ENCOUNTER
Pt states that he is at the pharmacy and they do not have the RX for the insulin needles. Pt uses [phone] Please advise. Yes

## 2022-07-27 NOTE — PROCEDURES
Dr Chandler called and notified RN that he cancelled LP orders.    RADIOLOGY POST PROCEDURE NOTE     September 13, 2017       10:17 AM     Preoperative Diagnosis:   Ascites. Postoperative Diagnosis:  Same. :  Dr. Ariel Noel    Assistant:  None. Type of Anesthesia: 1% plain lidocaine    Procedure/Description: US guided paracentesis    Findings:   No bleeding. Estimated blood Loss:  Minimal    Specimen Removed:   yes    Blood transfusions:  None. Implants:  None.     Complications: None    Condition: Stable    Discharge Plan:  discharge home     Krysta Early MD